# Patient Record
Sex: MALE | Race: WHITE | NOT HISPANIC OR LATINO | Employment: OTHER | URBAN - METROPOLITAN AREA
[De-identification: names, ages, dates, MRNs, and addresses within clinical notes are randomized per-mention and may not be internally consistent; named-entity substitution may affect disease eponyms.]

---

## 2017-01-03 ENCOUNTER — HOSPITAL ENCOUNTER (OUTPATIENT)
Dept: NUCLEAR MEDICINE | Facility: HOSPITAL | Age: 82
Discharge: HOME | End: 2017-01-03
Attending: INTERNAL MEDICINE

## 2017-01-03 DIAGNOSIS — C61 PROSTATE CANCER (H): ICD-10-CM

## 2017-01-12 ENCOUNTER — AMBULATORY - HEALTHEAST (OUTPATIENT)
Dept: CARDIOLOGY | Facility: CLINIC | Age: 82
End: 2017-01-12

## 2017-01-12 DIAGNOSIS — M81.0 OSTEOPOROSIS: ICD-10-CM

## 2017-01-12 DIAGNOSIS — D64.9 ANEMIA: ICD-10-CM

## 2017-01-12 DIAGNOSIS — I48.20 CHRONIC ATRIAL FIBRILLATION (H): ICD-10-CM

## 2017-01-21 ENCOUNTER — COMMUNICATION - HEALTHEAST (OUTPATIENT)
Dept: CARDIOLOGY | Facility: CLINIC | Age: 82
End: 2017-01-21

## 2017-01-21 DIAGNOSIS — I25.10 CAD (CORONARY ARTERY DISEASE): ICD-10-CM

## 2017-01-26 ENCOUNTER — AMBULATORY - HEALTHEAST (OUTPATIENT)
Dept: CARDIOLOGY | Facility: CLINIC | Age: 82
End: 2017-01-26

## 2017-01-26 ENCOUNTER — COMMUNICATION - HEALTHEAST (OUTPATIENT)
Dept: CARDIOLOGY | Facility: CLINIC | Age: 82
End: 2017-01-26

## 2017-01-26 DIAGNOSIS — I48.20 CHRONIC ATRIAL FIBRILLATION (H): ICD-10-CM

## 2017-01-31 ENCOUNTER — COMMUNICATION - HEALTHEAST (OUTPATIENT)
Dept: CARDIOLOGY | Facility: CLINIC | Age: 82
End: 2017-01-31

## 2017-02-08 ENCOUNTER — AMBULATORY - HEALTHEAST (OUTPATIENT)
Dept: CARDIOLOGY | Facility: CLINIC | Age: 82
End: 2017-02-08

## 2017-02-08 DIAGNOSIS — I48.20 CHRONIC ATRIAL FIBRILLATION (H): ICD-10-CM

## 2017-02-20 ENCOUNTER — RECORDS - HEALTHEAST (OUTPATIENT)
Dept: LAB | Facility: CLINIC | Age: 82
End: 2017-02-20

## 2017-02-20 LAB
CHOLEST SERPL-MCNC: 142 MG/DL
FASTING STATUS PATIENT QL REPORTED: NORMAL
HDLC SERPL-MCNC: 45 MG/DL
LDLC SERPL CALC-MCNC: 81 MG/DL
TRIGL SERPL-MCNC: 81 MG/DL

## 2017-02-22 ENCOUNTER — AMBULATORY - HEALTHEAST (OUTPATIENT)
Dept: CARDIOLOGY | Facility: CLINIC | Age: 82
End: 2017-02-22

## 2017-02-22 DIAGNOSIS — I48.20 CHRONIC ATRIAL FIBRILLATION (H): ICD-10-CM

## 2017-03-28 ENCOUNTER — AMBULATORY - HEALTHEAST (OUTPATIENT)
Dept: CARDIOLOGY | Facility: CLINIC | Age: 82
End: 2017-03-28

## 2017-03-28 DIAGNOSIS — I48.20 CHRONIC ATRIAL FIBRILLATION (H): ICD-10-CM

## 2017-04-05 ENCOUNTER — AMBULATORY - HEALTHEAST (OUTPATIENT)
Dept: CARDIOLOGY | Facility: CLINIC | Age: 82
End: 2017-04-05

## 2017-04-05 DIAGNOSIS — I48.20 CHRONIC ATRIAL FIBRILLATION (H): ICD-10-CM

## 2017-04-19 ENCOUNTER — AMBULATORY - HEALTHEAST (OUTPATIENT)
Dept: CARDIOLOGY | Facility: CLINIC | Age: 82
End: 2017-04-19

## 2017-04-19 DIAGNOSIS — I48.91 ATRIAL FIBRILLATION (H): ICD-10-CM

## 2017-05-03 ENCOUNTER — AMBULATORY - HEALTHEAST (OUTPATIENT)
Dept: CARDIOLOGY | Facility: CLINIC | Age: 82
End: 2017-05-03

## 2017-05-03 DIAGNOSIS — I48.20 CHRONIC ATRIAL FIBRILLATION (H): ICD-10-CM

## 2017-05-15 ENCOUNTER — COMMUNICATION - HEALTHEAST (OUTPATIENT)
Dept: CARDIOLOGY | Facility: CLINIC | Age: 82
End: 2017-05-15

## 2017-05-15 DIAGNOSIS — I48.91 A-FIB (H): ICD-10-CM

## 2017-05-31 ENCOUNTER — AMBULATORY - HEALTHEAST (OUTPATIENT)
Dept: CARDIOLOGY | Facility: CLINIC | Age: 82
End: 2017-05-31

## 2017-05-31 ENCOUNTER — COMMUNICATION - HEALTHEAST (OUTPATIENT)
Dept: CARDIOLOGY | Facility: CLINIC | Age: 82
End: 2017-05-31

## 2017-05-31 DIAGNOSIS — I48.91 A-FIB (H): ICD-10-CM

## 2017-05-31 DIAGNOSIS — I48.20 CHRONIC ATRIAL FIBRILLATION (H): ICD-10-CM

## 2017-05-31 DIAGNOSIS — I10 ESSENTIAL HYPERTENSION, BENIGN: ICD-10-CM

## 2017-05-31 DIAGNOSIS — I35.9 AORTIC VALVE DISORDERS: ICD-10-CM

## 2017-06-14 ENCOUNTER — AMBULATORY - HEALTHEAST (OUTPATIENT)
Dept: CARDIOLOGY | Facility: CLINIC | Age: 82
End: 2017-06-14

## 2017-06-14 DIAGNOSIS — I48.20 CHRONIC ATRIAL FIBRILLATION (H): ICD-10-CM

## 2017-07-11 ENCOUNTER — AMBULATORY - HEALTHEAST (OUTPATIENT)
Dept: CARDIOLOGY | Facility: CLINIC | Age: 82
End: 2017-07-11

## 2017-07-11 DIAGNOSIS — I48.20 CHRONIC ATRIAL FIBRILLATION (H): ICD-10-CM

## 2017-07-25 ENCOUNTER — RECORDS - HEALTHEAST (OUTPATIENT)
Dept: LAB | Facility: CLINIC | Age: 82
End: 2017-07-25

## 2017-07-25 LAB
CHOLEST SERPL-MCNC: 108 MG/DL
FASTING STATUS PATIENT QL REPORTED: YES
HDLC SERPL-MCNC: 44 MG/DL
LDLC SERPL CALC-MCNC: 51 MG/DL
TRIGL SERPL-MCNC: 64 MG/DL

## 2017-08-02 ENCOUNTER — AMBULATORY - HEALTHEAST (OUTPATIENT)
Dept: ADMINISTRATIVE | Facility: REHABILITATION | Age: 82
End: 2017-08-02

## 2017-08-02 DIAGNOSIS — R26.81 UNSTEADY GAIT: ICD-10-CM

## 2017-08-08 ENCOUNTER — AMBULATORY - HEALTHEAST (OUTPATIENT)
Dept: CARDIOLOGY | Facility: CLINIC | Age: 82
End: 2017-08-08

## 2017-08-08 DIAGNOSIS — I48.20 CHRONIC ATRIAL FIBRILLATION (H): ICD-10-CM

## 2017-08-09 ENCOUNTER — OFFICE VISIT - HEALTHEAST (OUTPATIENT)
Dept: PHYSICAL THERAPY | Facility: REHABILITATION | Age: 82
End: 2017-08-09

## 2017-08-09 DIAGNOSIS — Z74.09 DECREASED FUNCTIONAL MOBILITY AND ENDURANCE: ICD-10-CM

## 2017-08-09 DIAGNOSIS — R26.81 GAIT INSTABILITY: ICD-10-CM

## 2017-08-09 DIAGNOSIS — R26.89 POOR BALANCE: ICD-10-CM

## 2017-08-21 ENCOUNTER — OFFICE VISIT - HEALTHEAST (OUTPATIENT)
Dept: PHYSICAL THERAPY | Facility: REHABILITATION | Age: 82
End: 2017-08-21

## 2017-08-21 DIAGNOSIS — R26.89 POOR BALANCE: ICD-10-CM

## 2017-08-21 DIAGNOSIS — R26.81 GAIT INSTABILITY: ICD-10-CM

## 2017-08-21 DIAGNOSIS — Z74.09 DECREASED FUNCTIONAL MOBILITY AND ENDURANCE: ICD-10-CM

## 2017-08-24 ENCOUNTER — AMBULATORY - HEALTHEAST (OUTPATIENT)
Dept: CARDIOLOGY | Facility: CLINIC | Age: 82
End: 2017-08-24

## 2017-08-24 ENCOUNTER — OFFICE VISIT - HEALTHEAST (OUTPATIENT)
Dept: PHYSICAL THERAPY | Facility: REHABILITATION | Age: 82
End: 2017-08-24

## 2017-08-24 DIAGNOSIS — Z74.09 DECREASED FUNCTIONAL MOBILITY AND ENDURANCE: ICD-10-CM

## 2017-08-24 DIAGNOSIS — R26.81 GAIT INSTABILITY: ICD-10-CM

## 2017-08-24 DIAGNOSIS — I48.20 CHRONIC ATRIAL FIBRILLATION (H): ICD-10-CM

## 2017-08-24 DIAGNOSIS — R26.89 POOR BALANCE: ICD-10-CM

## 2017-08-28 ENCOUNTER — OFFICE VISIT - HEALTHEAST (OUTPATIENT)
Dept: PHYSICAL THERAPY | Facility: REHABILITATION | Age: 82
End: 2017-08-28

## 2017-08-28 DIAGNOSIS — R26.81 GAIT INSTABILITY: ICD-10-CM

## 2017-08-28 DIAGNOSIS — Z74.09 DECREASED FUNCTIONAL MOBILITY AND ENDURANCE: ICD-10-CM

## 2017-08-28 DIAGNOSIS — R26.89 POOR BALANCE: ICD-10-CM

## 2017-08-30 ENCOUNTER — OFFICE VISIT - HEALTHEAST (OUTPATIENT)
Dept: PHYSICAL THERAPY | Facility: REHABILITATION | Age: 82
End: 2017-08-30

## 2017-08-30 DIAGNOSIS — R26.89 POOR BALANCE: ICD-10-CM

## 2017-08-30 DIAGNOSIS — R26.81 GAIT INSTABILITY: ICD-10-CM

## 2017-08-30 DIAGNOSIS — Z74.09 DECREASED FUNCTIONAL MOBILITY AND ENDURANCE: ICD-10-CM

## 2017-09-05 ENCOUNTER — OFFICE VISIT - HEALTHEAST (OUTPATIENT)
Dept: PHYSICAL THERAPY | Facility: REHABILITATION | Age: 82
End: 2017-09-05

## 2017-09-05 DIAGNOSIS — R26.81 GAIT INSTABILITY: ICD-10-CM

## 2017-09-05 DIAGNOSIS — R26.89 POOR BALANCE: ICD-10-CM

## 2017-09-05 DIAGNOSIS — Z74.09 DECREASED FUNCTIONAL MOBILITY AND ENDURANCE: ICD-10-CM

## 2017-09-07 ENCOUNTER — OFFICE VISIT - HEALTHEAST (OUTPATIENT)
Dept: PHYSICAL THERAPY | Facility: REHABILITATION | Age: 82
End: 2017-09-07

## 2017-09-07 DIAGNOSIS — Z74.09 DECREASED FUNCTIONAL MOBILITY AND ENDURANCE: ICD-10-CM

## 2017-09-07 DIAGNOSIS — R26.89 POOR BALANCE: ICD-10-CM

## 2017-09-07 DIAGNOSIS — R26.81 GAIT INSTABILITY: ICD-10-CM

## 2017-09-07 DIAGNOSIS — M25.652 HIP STIFFNESS, LEFT: ICD-10-CM

## 2017-09-12 ENCOUNTER — OFFICE VISIT - HEALTHEAST (OUTPATIENT)
Dept: PHYSICAL THERAPY | Facility: REHABILITATION | Age: 82
End: 2017-09-12

## 2017-09-12 DIAGNOSIS — R26.89 POOR BALANCE: ICD-10-CM

## 2017-09-12 DIAGNOSIS — R26.81 GAIT INSTABILITY: ICD-10-CM

## 2017-09-12 DIAGNOSIS — Z74.09 DECREASED FUNCTIONAL MOBILITY AND ENDURANCE: ICD-10-CM

## 2017-09-14 ENCOUNTER — OFFICE VISIT - HEALTHEAST (OUTPATIENT)
Dept: PHYSICAL THERAPY | Facility: REHABILITATION | Age: 82
End: 2017-09-14

## 2017-09-14 DIAGNOSIS — R26.89 POOR BALANCE: ICD-10-CM

## 2017-09-14 DIAGNOSIS — R26.81 GAIT INSTABILITY: ICD-10-CM

## 2017-09-14 DIAGNOSIS — Z74.09 DECREASED FUNCTIONAL MOBILITY AND ENDURANCE: ICD-10-CM

## 2017-09-19 ENCOUNTER — OFFICE VISIT - HEALTHEAST (OUTPATIENT)
Dept: PHYSICAL THERAPY | Facility: REHABILITATION | Age: 82
End: 2017-09-19

## 2017-09-19 DIAGNOSIS — R26.89 POOR BALANCE: ICD-10-CM

## 2017-09-19 DIAGNOSIS — Z74.09 DECREASED FUNCTIONAL MOBILITY AND ENDURANCE: ICD-10-CM

## 2017-09-19 DIAGNOSIS — R26.81 GAIT INSTABILITY: ICD-10-CM

## 2017-09-22 ENCOUNTER — AMBULATORY - HEALTHEAST (OUTPATIENT)
Dept: CARDIOLOGY | Facility: CLINIC | Age: 82
End: 2017-09-22

## 2017-09-22 DIAGNOSIS — I48.20 CHRONIC ATRIAL FIBRILLATION (H): ICD-10-CM

## 2017-10-19 ENCOUNTER — AMBULATORY - HEALTHEAST (OUTPATIENT)
Dept: CARDIOLOGY | Facility: CLINIC | Age: 82
End: 2017-10-19

## 2017-10-19 DIAGNOSIS — I48.91 ATRIAL FIBRILLATION (H): ICD-10-CM

## 2017-11-07 ENCOUNTER — RECORDS - HEALTHEAST (OUTPATIENT)
Dept: LAB | Facility: CLINIC | Age: 82
End: 2017-11-07

## 2017-11-07 LAB
CHOLEST SERPL-MCNC: 131 MG/DL
FASTING STATUS PATIENT QL REPORTED: YES
HDLC SERPL-MCNC: 47 MG/DL
LDLC SERPL CALC-MCNC: 74 MG/DL
TRIGL SERPL-MCNC: 51 MG/DL

## 2017-11-16 ENCOUNTER — AMBULATORY - HEALTHEAST (OUTPATIENT)
Dept: CARDIOLOGY | Facility: CLINIC | Age: 82
End: 2017-11-16

## 2017-11-16 DIAGNOSIS — I48.20 CHRONIC ATRIAL FIBRILLATION (H): ICD-10-CM

## 2017-12-05 ENCOUNTER — RECORDS - HEALTHEAST (OUTPATIENT)
Dept: ADMINISTRATIVE | Facility: OTHER | Age: 82
End: 2017-12-05

## 2017-12-05 ENCOUNTER — AMBULATORY - HEALTHEAST (OUTPATIENT)
Dept: CARDIOLOGY | Facility: CLINIC | Age: 82
End: 2017-12-05

## 2017-12-06 ENCOUNTER — AMBULATORY - HEALTHEAST (OUTPATIENT)
Dept: CARDIOLOGY | Facility: CLINIC | Age: 82
End: 2017-12-06

## 2017-12-06 DIAGNOSIS — I48.20 CHRONIC ATRIAL FIBRILLATION (H): ICD-10-CM

## 2017-12-08 ENCOUNTER — OFFICE VISIT - HEALTHEAST (OUTPATIENT)
Dept: CARDIOLOGY | Facility: CLINIC | Age: 82
End: 2017-12-08

## 2017-12-08 DIAGNOSIS — I71.21 ASCENDING AORTIC ANEURYSM (H): ICD-10-CM

## 2017-12-08 DIAGNOSIS — I48.20 CHRONIC ATRIAL FIBRILLATION (H): ICD-10-CM

## 2017-12-08 DIAGNOSIS — I25.10 CORONARY ARTERY DISEASE INVOLVING NATIVE CORONARY ARTERY OF NATIVE HEART WITHOUT ANGINA PECTORIS: ICD-10-CM

## 2017-12-08 DIAGNOSIS — I34.0 NON-RHEUMATIC MITRAL REGURGITATION: ICD-10-CM

## 2017-12-08 ASSESSMENT — MIFFLIN-ST. JEOR: SCORE: 1309.74

## 2017-12-12 ENCOUNTER — HOSPITAL ENCOUNTER (OUTPATIENT)
Dept: CT IMAGING | Facility: HOSPITAL | Age: 82
Discharge: HOME | End: 2017-12-12
Attending: INTERNAL MEDICINE

## 2017-12-12 DIAGNOSIS — I71.21 ASCENDING AORTIC ANEURYSM (H): ICD-10-CM

## 2018-01-02 ENCOUNTER — AMBULATORY - HEALTHEAST (OUTPATIENT)
Dept: CARDIOLOGY | Facility: CLINIC | Age: 83
End: 2018-01-02

## 2018-01-02 DIAGNOSIS — I48.91 ATRIAL FIBRILLATION (H): ICD-10-CM

## 2018-01-02 LAB — INTERNATIONAL NORMALIZATION RATIO, POC - HISTORICAL: 2.3

## 2018-01-15 ENCOUNTER — COMMUNICATION - HEALTHEAST (OUTPATIENT)
Dept: CARDIOLOGY | Facility: CLINIC | Age: 83
End: 2018-01-15

## 2018-01-15 DIAGNOSIS — I48.91 A-FIB (H): ICD-10-CM

## 2018-01-15 DIAGNOSIS — I25.10 CAD (CORONARY ARTERY DISEASE): ICD-10-CM

## 2018-02-05 ENCOUNTER — AMBULATORY - HEALTHEAST (OUTPATIENT)
Dept: CARDIOLOGY | Facility: CLINIC | Age: 83
End: 2018-02-05

## 2018-02-05 ENCOUNTER — RECORDS - HEALTHEAST (OUTPATIENT)
Dept: LAB | Facility: CLINIC | Age: 83
End: 2018-02-05

## 2018-02-05 DIAGNOSIS — I48.20 CHRONIC ATRIAL FIBRILLATION (H): ICD-10-CM

## 2018-02-05 LAB
ALBUMIN SERPL-MCNC: 3.8 G/DL (ref 3.5–5)
ANION GAP SERPL CALCULATED.3IONS-SCNC: 9 MMOL/L (ref 5–18)
BUN SERPL-MCNC: 25 MG/DL (ref 8–28)
CALCIUM SERPL-MCNC: 11 MG/DL (ref 8.5–10.5)
CHLORIDE BLD-SCNC: 113 MMOL/L (ref 98–107)
CO2 SERPL-SCNC: 20 MMOL/L (ref 22–31)
CREAT SERPL-MCNC: 0.99 MG/DL (ref 0.7–1.3)
GFR SERPL CREATININE-BSD FRML MDRD: >60 ML/MIN/1.73M2
GLUCOSE BLD-MCNC: 98 MG/DL (ref 70–125)
INTERNATIONAL NORMALIZATION RATIO, POC - HISTORICAL: 2.3
PHOSPHATE SERPL-MCNC: 3 MG/DL (ref 2.5–4.5)
POTASSIUM BLD-SCNC: 3.8 MMOL/L (ref 3.5–5)
SODIUM SERPL-SCNC: 142 MMOL/L (ref 136–145)

## 2018-02-08 ENCOUNTER — ANESTHESIA - HEALTHEAST (OUTPATIENT)
Dept: SURGERY | Facility: HOSPITAL | Age: 83
End: 2018-02-08

## 2018-02-08 ASSESSMENT — MIFFLIN-ST. JEOR: SCORE: 1281.39

## 2018-02-09 ENCOUNTER — SURGERY - HEALTHEAST (OUTPATIENT)
Dept: SURGERY | Facility: HOSPITAL | Age: 83
End: 2018-02-09

## 2018-02-12 ENCOUNTER — HOSPITAL ENCOUNTER (OUTPATIENT)
Dept: RADIOLOGY | Facility: HOSPITAL | Age: 83
Discharge: HOME | End: 2018-02-12
Attending: UROLOGY

## 2018-02-12 ENCOUNTER — ANESTHESIA - HEALTHEAST (OUTPATIENT)
Dept: SURGERY | Facility: HOSPITAL | Age: 83
End: 2018-02-12

## 2018-02-12 ENCOUNTER — SURGERY - HEALTHEAST (OUTPATIENT)
Dept: SURGERY | Facility: HOSPITAL | Age: 83
End: 2018-02-12

## 2018-02-13 ENCOUNTER — COMMUNICATION - HEALTHEAST (OUTPATIENT)
Dept: CARDIOLOGY | Facility: CLINIC | Age: 83
End: 2018-02-13

## 2018-02-14 ENCOUNTER — AMBULATORY - HEALTHEAST (OUTPATIENT)
Dept: CARDIOLOGY | Facility: CLINIC | Age: 83
End: 2018-02-14

## 2018-02-14 DIAGNOSIS — I48.20 CHRONIC ATRIAL FIBRILLATION (H): ICD-10-CM

## 2018-03-02 ASSESSMENT — MIFFLIN-ST. JEOR: SCORE: 1281.39

## 2018-03-05 ENCOUNTER — SURGERY - HEALTHEAST (OUTPATIENT)
Dept: SURGERY | Facility: HOSPITAL | Age: 83
End: 2018-03-05

## 2018-03-05 ENCOUNTER — ANESTHESIA - HEALTHEAST (OUTPATIENT)
Dept: SURGERY | Facility: HOSPITAL | Age: 83
End: 2018-03-05

## 2018-03-08 ENCOUNTER — AMBULATORY - HEALTHEAST (OUTPATIENT)
Dept: CARDIOLOGY | Facility: CLINIC | Age: 83
End: 2018-03-08

## 2018-03-08 DIAGNOSIS — I48.20 CHRONIC ATRIAL FIBRILLATION (H): ICD-10-CM

## 2018-03-08 LAB — INTERNATIONAL NORMALIZATION RATIO, POC - HISTORICAL: 1.1

## 2018-03-15 ENCOUNTER — AMBULATORY - HEALTHEAST (OUTPATIENT)
Dept: CARDIOLOGY | Facility: CLINIC | Age: 83
End: 2018-03-15

## 2018-03-15 DIAGNOSIS — I48.20 CHRONIC ATRIAL FIBRILLATION (H): ICD-10-CM

## 2018-03-15 LAB — INTERNATIONAL NORMALIZATION RATIO, POC - HISTORICAL: 2.3

## 2018-03-29 ENCOUNTER — AMBULATORY - HEALTHEAST (OUTPATIENT)
Dept: CARDIOLOGY | Facility: CLINIC | Age: 83
End: 2018-03-29

## 2018-03-29 DIAGNOSIS — I48.20 CHRONIC ATRIAL FIBRILLATION (H): ICD-10-CM

## 2018-03-29 LAB — INTERNATIONAL NORMALIZATION RATIO, POC - HISTORICAL: 3.2

## 2018-04-12 ENCOUNTER — AMBULATORY - HEALTHEAST (OUTPATIENT)
Dept: CARDIOLOGY | Facility: CLINIC | Age: 83
End: 2018-04-12

## 2018-04-12 ENCOUNTER — RECORDS - HEALTHEAST (OUTPATIENT)
Dept: LAB | Facility: CLINIC | Age: 83
End: 2018-04-12

## 2018-04-12 DIAGNOSIS — I48.20 CHRONIC ATRIAL FIBRILLATION (H): ICD-10-CM

## 2018-04-12 LAB
ALBUMIN SERPL-MCNC: 3.8 G/DL (ref 3.5–5)
ALP SERPL-CCNC: 47 U/L (ref 45–120)
ALT SERPL W P-5'-P-CCNC: 15 U/L (ref 0–45)
ANION GAP SERPL CALCULATED.3IONS-SCNC: 8 MMOL/L (ref 5–18)
AST SERPL W P-5'-P-CCNC: 24 U/L (ref 0–40)
BASOPHILS # BLD AUTO: 0 THOU/UL (ref 0–0.2)
BASOPHILS NFR BLD AUTO: 1 % (ref 0–2)
BILIRUB SERPL-MCNC: 0.7 MG/DL (ref 0–1)
BUN SERPL-MCNC: 24 MG/DL (ref 8–28)
CALCIUM SERPL-MCNC: 10.8 MG/DL (ref 8.5–10.5)
CHLORIDE BLD-SCNC: 110 MMOL/L (ref 98–107)
CHOLEST SERPL-MCNC: 136 MG/DL
CO2 SERPL-SCNC: 23 MMOL/L (ref 22–31)
CREAT SERPL-MCNC: 1.21 MG/DL (ref 0.7–1.3)
EOSINOPHIL # BLD AUTO: 0.1 THOU/UL (ref 0–0.4)
EOSINOPHIL NFR BLD AUTO: 3 % (ref 0–6)
ERYTHROCYTE [DISTWIDTH] IN BLOOD BY AUTOMATED COUNT: 14.4 % (ref 11–14.5)
FASTING STATUS PATIENT QL REPORTED: NORMAL
GFR SERPL CREATININE-BSD FRML MDRD: 58 ML/MIN/1.73M2
GLUCOSE BLD-MCNC: 92 MG/DL (ref 70–125)
HCT VFR BLD AUTO: 34.6 % (ref 40–54)
HDLC SERPL-MCNC: 46 MG/DL
HGB BLD-MCNC: 11.2 G/DL (ref 14–18)
INTERNATIONAL NORMALIZATION RATIO, POC - HISTORICAL: 2.2
LDLC SERPL CALC-MCNC: 76 MG/DL
LYMPHOCYTES # BLD AUTO: 0.7 THOU/UL (ref 0.8–4.4)
LYMPHOCYTES NFR BLD AUTO: 16 % (ref 20–40)
MCH RBC QN AUTO: 30 PG (ref 27–34)
MCHC RBC AUTO-ENTMCNC: 32.4 G/DL (ref 32–36)
MCV RBC AUTO: 93 FL (ref 80–100)
MONOCYTES # BLD AUTO: 0.5 THOU/UL (ref 0–0.9)
MONOCYTES NFR BLD AUTO: 11 % (ref 2–10)
NEUTROPHILS # BLD AUTO: 2.9 THOU/UL (ref 2–7.7)
NEUTROPHILS NFR BLD AUTO: 70 % (ref 50–70)
PLATELET # BLD AUTO: 212 THOU/UL (ref 140–440)
PMV BLD AUTO: 10.3 FL (ref 8.5–12.5)
POTASSIUM BLD-SCNC: 4 MMOL/L (ref 3.5–5)
PROT SERPL-MCNC: 6.4 G/DL (ref 6–8)
RBC # BLD AUTO: 3.73 MILL/UL (ref 4.4–6.2)
SODIUM SERPL-SCNC: 141 MMOL/L (ref 136–145)
TRIGL SERPL-MCNC: 71 MG/DL
TSH SERPL DL<=0.005 MIU/L-ACNC: 1.25 UIU/ML (ref 0.3–5)
WBC: 4.2 THOU/UL (ref 4–11)

## 2018-05-10 ENCOUNTER — AMBULATORY - HEALTHEAST (OUTPATIENT)
Dept: CARDIOLOGY | Facility: CLINIC | Age: 83
End: 2018-05-10

## 2018-05-10 DIAGNOSIS — I48.20 CHRONIC ATRIAL FIBRILLATION (H): ICD-10-CM

## 2018-05-10 LAB — INTERNATIONAL NORMALIZATION RATIO, POC - HISTORICAL: 1.7

## 2018-05-29 ENCOUNTER — AMBULATORY - HEALTHEAST (OUTPATIENT)
Dept: CARDIOLOGY | Facility: CLINIC | Age: 83
End: 2018-05-29

## 2018-05-29 DIAGNOSIS — I48.91 ATRIAL FIBRILLATION (H): ICD-10-CM

## 2018-05-29 LAB — INTERNATIONAL NORMALIZATION RATIO, POC - HISTORICAL: 1.7

## 2018-06-05 ENCOUNTER — RECORDS - HEALTHEAST (OUTPATIENT)
Dept: LAB | Facility: CLINIC | Age: 83
End: 2018-06-05

## 2018-06-05 ENCOUNTER — COMMUNICATION - HEALTHEAST (OUTPATIENT)
Dept: CARDIOLOGY | Facility: CLINIC | Age: 83
End: 2018-06-05

## 2018-06-05 ENCOUNTER — AMBULATORY - HEALTHEAST (OUTPATIENT)
Dept: CARDIOLOGY | Facility: CLINIC | Age: 83
End: 2018-06-05

## 2018-06-05 DIAGNOSIS — I48.20 CHRONIC ATRIAL FIBRILLATION (H): ICD-10-CM

## 2018-06-05 LAB
ALBUMIN SERPL-MCNC: 3.8 G/DL (ref 3.5–5)
ANION GAP SERPL CALCULATED.3IONS-SCNC: 8 MMOL/L (ref 5–18)
BUN SERPL-MCNC: 25 MG/DL (ref 8–28)
CALCIUM SERPL-MCNC: 10.6 MG/DL (ref 8.5–10.5)
CHLORIDE BLD-SCNC: 111 MMOL/L (ref 98–107)
CO2 SERPL-SCNC: 24 MMOL/L (ref 22–31)
CREAT SERPL-MCNC: 0.95 MG/DL (ref 0.7–1.3)
GFR SERPL CREATININE-BSD FRML MDRD: >60 ML/MIN/1.73M2
GLUCOSE BLD-MCNC: 93 MG/DL (ref 70–125)
INTERNATIONAL NORMALIZATION RATIO, POC - HISTORICAL: 2.4
PHOSPHATE SERPL-MCNC: 2.1 MG/DL (ref 2.5–4.5)
POTASSIUM BLD-SCNC: 3.9 MMOL/L (ref 3.5–5)
SODIUM SERPL-SCNC: 143 MMOL/L (ref 136–145)

## 2018-06-12 ENCOUNTER — AMBULATORY - HEALTHEAST (OUTPATIENT)
Dept: CARDIOLOGY | Facility: CLINIC | Age: 83
End: 2018-06-12

## 2018-06-12 DIAGNOSIS — I48.20 CHRONIC ATRIAL FIBRILLATION (H): ICD-10-CM

## 2018-06-12 LAB — INTERNATIONAL NORMALIZATION RATIO, POC - HISTORICAL: 3.8

## 2018-06-19 ENCOUNTER — AMBULATORY - HEALTHEAST (OUTPATIENT)
Dept: CARDIOLOGY | Facility: CLINIC | Age: 83
End: 2018-06-19

## 2018-06-19 DIAGNOSIS — Z79.01 LONG-TERM (CURRENT) USE OF ANTICOAGULANTS: ICD-10-CM

## 2018-06-19 DIAGNOSIS — I48.91 ATRIAL FIBRILLATION (H): ICD-10-CM

## 2018-06-19 LAB — POC INR - HE - HISTORICAL: 3.1 (ref 0.9–1.1)

## 2018-06-26 ENCOUNTER — AMBULATORY - HEALTHEAST (OUTPATIENT)
Dept: CARDIOLOGY | Facility: CLINIC | Age: 83
End: 2018-06-26

## 2018-06-26 DIAGNOSIS — I48.20 CHRONIC ATRIAL FIBRILLATION (H): ICD-10-CM

## 2018-06-26 LAB — INTERNATIONAL NORMALIZATION RATIO, POC - HISTORICAL: 4.5

## 2018-06-29 ENCOUNTER — COMMUNICATION - HEALTHEAST (OUTPATIENT)
Dept: CARDIOLOGY | Facility: CLINIC | Age: 83
End: 2018-06-29

## 2018-07-03 ENCOUNTER — AMBULATORY - HEALTHEAST (OUTPATIENT)
Dept: CARDIOLOGY | Facility: CLINIC | Age: 83
End: 2018-07-03

## 2018-07-03 DIAGNOSIS — I48.91 ATRIAL FIBRILLATION (H): ICD-10-CM

## 2018-07-03 LAB — INTERNATIONAL NORMALIZATION RATIO, POC - HISTORICAL: 3.6

## 2018-07-16 ENCOUNTER — COMMUNICATION - HEALTHEAST (OUTPATIENT)
Dept: CARDIOLOGY | Facility: CLINIC | Age: 83
End: 2018-07-16

## 2018-07-16 DIAGNOSIS — I25.10 CAD (CORONARY ARTERY DISEASE): ICD-10-CM

## 2018-07-16 DIAGNOSIS — I48.91 A-FIB (H): ICD-10-CM

## 2018-07-17 ENCOUNTER — AMBULATORY - HEALTHEAST (OUTPATIENT)
Dept: CARDIOLOGY | Facility: CLINIC | Age: 83
End: 2018-07-17

## 2018-07-17 DIAGNOSIS — I48.91 ATRIAL FIBRILLATION (H): ICD-10-CM

## 2018-07-17 LAB — INTERNATIONAL NORMALIZATION RATIO, POC - HISTORICAL: 3

## 2018-07-31 ENCOUNTER — AMBULATORY - HEALTHEAST (OUTPATIENT)
Dept: CARDIOLOGY | Facility: CLINIC | Age: 83
End: 2018-07-31

## 2018-07-31 DIAGNOSIS — I48.91 ATRIAL FIBRILLATION (H): ICD-10-CM

## 2018-07-31 LAB — INTERNATIONAL NORMALIZATION RATIO, POC - HISTORICAL: 1.4

## 2018-08-07 ENCOUNTER — AMBULATORY - HEALTHEAST (OUTPATIENT)
Dept: CARDIOLOGY | Facility: CLINIC | Age: 83
End: 2018-08-07

## 2018-08-07 DIAGNOSIS — I48.91 ATRIAL FIBRILLATION (H): ICD-10-CM

## 2018-08-07 LAB — INTERNATIONAL NORMALIZATION RATIO, POC - HISTORICAL: 1.5

## 2018-08-14 ENCOUNTER — AMBULATORY - HEALTHEAST (OUTPATIENT)
Dept: CARDIOLOGY | Facility: CLINIC | Age: 83
End: 2018-08-14

## 2018-08-14 DIAGNOSIS — I48.21 PERMANENT ATRIAL FIBRILLATION (H): ICD-10-CM

## 2018-08-14 LAB — INTERNATIONAL NORMALIZATION RATIO, POC - HISTORICAL: 2.1

## 2018-08-28 ENCOUNTER — AMBULATORY - HEALTHEAST (OUTPATIENT)
Dept: CARDIOLOGY | Facility: CLINIC | Age: 83
End: 2018-08-28

## 2018-08-28 DIAGNOSIS — I48.91 ATRIAL FIBRILLATION (H): ICD-10-CM

## 2018-08-28 LAB — INTERNATIONAL NORMALIZATION RATIO, POC - HISTORICAL: 1.8

## 2018-08-31 ENCOUNTER — COMMUNICATION - HEALTHEAST (OUTPATIENT)
Dept: CARDIOLOGY | Facility: CLINIC | Age: 83
End: 2018-08-31

## 2018-08-31 DIAGNOSIS — I48.20 CHRONIC ATRIAL FIBRILLATION (H): ICD-10-CM

## 2018-09-11 ENCOUNTER — AMBULATORY - HEALTHEAST (OUTPATIENT)
Dept: CARDIOLOGY | Facility: CLINIC | Age: 83
End: 2018-09-11

## 2018-09-11 DIAGNOSIS — I48.21 PERMANENT ATRIAL FIBRILLATION (H): ICD-10-CM

## 2018-09-11 LAB — INTERNATIONAL NORMALIZATION RATIO, POC - HISTORICAL: 2.4

## 2018-09-25 ENCOUNTER — AMBULATORY - HEALTHEAST (OUTPATIENT)
Dept: CARDIOLOGY | Facility: CLINIC | Age: 83
End: 2018-09-25

## 2018-09-25 DIAGNOSIS — I48.91 ATRIAL FIBRILLATION (H): ICD-10-CM

## 2018-09-25 DIAGNOSIS — I34.0 NON-RHEUMATIC MITRAL REGURGITATION: ICD-10-CM

## 2018-09-25 DIAGNOSIS — I71.21 ASCENDING AORTIC ANEURYSM (H): ICD-10-CM

## 2018-09-25 LAB — INTERNATIONAL NORMALIZATION RATIO, POC - HISTORICAL: 2.2

## 2018-10-11 ENCOUNTER — COMMUNICATION - HEALTHEAST (OUTPATIENT)
Dept: CARDIOLOGY | Facility: CLINIC | Age: 83
End: 2018-10-11

## 2018-10-11 DIAGNOSIS — I48.91 A-FIB (H): ICD-10-CM

## 2018-10-23 ENCOUNTER — AMBULATORY - HEALTHEAST (OUTPATIENT)
Dept: CARDIOLOGY | Facility: CLINIC | Age: 83
End: 2018-10-23

## 2018-10-23 DIAGNOSIS — I48.91 ATRIAL FIBRILLATION (H): ICD-10-CM

## 2018-10-23 LAB — INTERNATIONAL NORMALIZATION RATIO, POC - HISTORICAL: 1.7

## 2018-11-08 ENCOUNTER — AMBULATORY - HEALTHEAST (OUTPATIENT)
Dept: CARDIOLOGY | Facility: CLINIC | Age: 83
End: 2018-11-08

## 2018-11-08 DIAGNOSIS — I48.91 ATRIAL FIBRILLATION (H): ICD-10-CM

## 2018-11-08 LAB — INTERNATIONAL NORMALIZATION RATIO, POC - HISTORICAL: 2.3

## 2018-11-24 ENCOUNTER — COMMUNICATION - HEALTHEAST (OUTPATIENT)
Dept: CARDIOLOGY | Facility: CLINIC | Age: 83
End: 2018-11-24

## 2018-11-24 DIAGNOSIS — I48.20 CHRONIC ATRIAL FIBRILLATION (H): ICD-10-CM

## 2018-12-13 ENCOUNTER — AMBULATORY - HEALTHEAST (OUTPATIENT)
Dept: CARDIOLOGY | Facility: CLINIC | Age: 83
End: 2018-12-13

## 2018-12-13 ENCOUNTER — HOSPITAL ENCOUNTER (OUTPATIENT)
Dept: CARDIOLOGY | Facility: HOSPITAL | Age: 83
Discharge: HOME | End: 2018-12-13
Attending: INTERNAL MEDICINE

## 2018-12-13 DIAGNOSIS — I71.21 ASCENDING AORTIC ANEURYSM (H): ICD-10-CM

## 2018-12-13 DIAGNOSIS — I34.0 NON-RHEUMATIC MITRAL REGURGITATION: ICD-10-CM

## 2018-12-13 DIAGNOSIS — I48.91 ATRIAL FIBRILLATION (H): ICD-10-CM

## 2018-12-13 LAB — INTERNATIONAL NORMALIZATION RATIO, POC - HISTORICAL: 2.4

## 2018-12-13 ASSESSMENT — MIFFLIN-ST. JEOR: SCORE: 1281.39

## 2018-12-14 ENCOUNTER — RECORDS - HEALTHEAST (OUTPATIENT)
Dept: ADMINISTRATIVE | Facility: OTHER | Age: 83
End: 2018-12-14

## 2018-12-14 LAB
AORTIC ROOT: 4.5 CM
AORTIC VALVE MEAN VELOCITY: 133 CM/S
AR DECEL SLOPE: 1710 MM/S2
AR PEAK VELOCITY: 358 CM/S
ASCENDING AORTA: 4.4 CM
AV DIMENSIONLESS INDEX VTI: 0.4
AV MEAN GRADIENT: 9 MMHG
AV PEAK GRADIENT: 15.7 MMHG
AV REGURGITANT PEAK GRADIENT: 51.3 MMHG
AV REGURGITATION PRESSURE HALF TIME: 628 MS
AV VALVE AREA: 2.1 CM2
BSA FOR ECHO PROCEDURE: 1.74 M2
CV BLOOD PRESSURE: ABNORMAL MMHG
CV ECHO HEIGHT: 66 IN
CV ECHO WEIGHT: 143 LBS
DOP CALC AO PEAK VEL: 198 CM/S
DOP CALC AO VTI: 39.4 CM
DOP CALC LVOT AREA: 4.91 CM2
DOP CALC LVOT DIAMETER: 2.5 CM
DOP CALC LVOT STROKE VOLUME: 83.4 CM3
DOP CALCLVOT PEAK VEL VTI: 17 CM
EJECTION FRACTION: 41 % (ref 55–75)
FRACTIONAL SHORTENING: 28.3 % (ref 28–44)
INTERVENTRICULAR SEPTUM IN END DIASTOLE: 1 CM (ref 0.6–1)
IVS/PW RATIO: 1.1
LA AREA 1: 33.1 CM2
LA AREA 2: 32.7 CM2
LEFT ATRIUM LENGTH: 6.64 CM
LEFT ATRIUM SIZE: 6.1 CM
LEFT ATRIUM TO AORTIC ROOT RATIO: 1.36 NO UNITS
LEFT ATRIUM VOLUME INDEX: 79.6 ML/M2
LEFT ATRIUM VOLUME: 138.6 ML
LEFT VENTRICLE CARDIAC INDEX: 3.2 L/MIN/M2
LEFT VENTRICLE CARDIAC OUTPUT: 5.5 L/MIN
LEFT VENTRICLE DIASTOLIC VOLUME INDEX: 65.5 CM3/M2 (ref 34–74)
LEFT VENTRICLE DIASTOLIC VOLUME: 114 CM3 (ref 62–150)
LEFT VENTRICLE HEART RATE: 66 BPM
LEFT VENTRICLE MASS INDEX: 132.8 G/M2
LEFT VENTRICLE SYSTOLIC VOLUME INDEX: 38.5 CM3/M2 (ref 11–31)
LEFT VENTRICLE SYSTOLIC VOLUME: 67 CM3 (ref 21–61)
LEFT VENTRICULAR INTERNAL DIMENSION IN DIASTOLE: 6 CM (ref 4.2–5.8)
LEFT VENTRICULAR INTERNAL DIMENSION IN SYSTOLE: 4.3 CM (ref 2.5–4)
LEFT VENTRICULAR MASS: 231.1 G
LEFT VENTRICULAR OUTFLOW TRACT MEAN GRADIENT: 1 MMHG
LEFT VENTRICULAR OUTFLOW TRACT MEAN VELOCITY: 57.1 CM/S
LEFT VENTRICULAR POSTERIOR WALL IN END DIASTOLE: 0.9 CM (ref 0.6–1)
LV STROKE VOLUME INDEX: 47.9 ML/M2
MITRAL REGURGITANT VELOCITY TIME INTEGRAL: 173 CM
MR FLOW: 107 CM3
MR MEAN GRADIENT: 73 MMHG
MR MEAN VELOCITY: 410 CM/S
MR PEAK GRADIENT: 109.8 MMHG
MR PISA EROA: 0.6 CM2
MR PISA RADIUS: 1.3 CM
MR PISA VN NYQUIST: 30.8 CM/S
MV AVERAGE E/E' RATIO: 13 CM/S
MV DECELERATION TIME: 243 MS
MV E'TISSUE VEL-LAT: 6.14 CM/S
MV E'TISSUE VEL-MED: 5.26 CM/S
MV LATERAL E/E' RATIO: 12.1
MV MEDIAL E/E' RATIO: 14.1
MV PEAK E VELOCITY: 74 CM/S
MV REGURGITANT VOLUME: 107.9 CC
NUC REST DIASTOLIC VOLUME INDEX: 2288 LBS
NUC REST SYSTOLIC VOLUME INDEX: 66 IN
PISA MR PEAK VEL: 524 CM/S
PR MAX PG: 9 MMHG
PR PEAK VELOCITY: 164 CM/S
TRICUSPID REGURGITATION PEAK PRESSURE GRADIENT: 34.8 MMHG
TRICUSPID VALVE ANULAR PLANE SYSTOLIC EXCURSION: 1.5 CM
TRICUSPID VALVE PEAK REGURGITANT VELOCITY: 295 CM/S

## 2018-12-20 ENCOUNTER — AMBULATORY - HEALTHEAST (OUTPATIENT)
Dept: CARDIOLOGY | Facility: CLINIC | Age: 83
End: 2018-12-20

## 2018-12-20 ENCOUNTER — OFFICE VISIT - HEALTHEAST (OUTPATIENT)
Dept: CARDIOLOGY | Facility: CLINIC | Age: 83
End: 2018-12-20

## 2018-12-20 DIAGNOSIS — I34.0 NON-RHEUMATIC MITRAL REGURGITATION: ICD-10-CM

## 2018-12-20 DIAGNOSIS — I10 ESSENTIAL HYPERTENSION, BENIGN: ICD-10-CM

## 2018-12-20 DIAGNOSIS — I48.20 CHRONIC ATRIAL FIBRILLATION (H): ICD-10-CM

## 2018-12-20 DIAGNOSIS — I48.91 ATRIAL FIBRILLATION (H): ICD-10-CM

## 2018-12-20 LAB — INTERNATIONAL NORMALIZATION RATIO, POC - HISTORICAL: 3.4

## 2018-12-20 ASSESSMENT — MIFFLIN-ST. JEOR: SCORE: 1281.39

## 2018-12-22 ENCOUNTER — AMBULATORY - HEALTHEAST (OUTPATIENT)
Dept: CARDIOLOGY | Facility: CLINIC | Age: 83
End: 2018-12-22

## 2018-12-22 DIAGNOSIS — I48.0 PAROXYSMAL ATRIAL FIBRILLATION (H): ICD-10-CM

## 2019-01-03 ENCOUNTER — AMBULATORY - HEALTHEAST (OUTPATIENT)
Dept: CARDIOLOGY | Facility: CLINIC | Age: 84
End: 2019-01-03

## 2019-01-03 DIAGNOSIS — I48.91 ATRIAL FIBRILLATION (H): ICD-10-CM

## 2019-01-03 LAB — INTERNATIONAL NORMALIZATION RATIO, POC - HISTORICAL: 3

## 2019-01-08 ENCOUNTER — COMMUNICATION - HEALTHEAST (OUTPATIENT)
Dept: CARDIOLOGY | Facility: CLINIC | Age: 84
End: 2019-01-08

## 2019-01-08 DIAGNOSIS — I25.10 CAD (CORONARY ARTERY DISEASE): ICD-10-CM

## 2019-01-08 DIAGNOSIS — I48.91 A-FIB (H): ICD-10-CM

## 2019-01-17 ENCOUNTER — AMBULATORY - HEALTHEAST (OUTPATIENT)
Dept: CARDIOLOGY | Facility: CLINIC | Age: 84
End: 2019-01-17

## 2019-01-17 DIAGNOSIS — I48.91 ATRIAL FIBRILLATION (H): ICD-10-CM

## 2019-01-17 LAB — INTERNATIONAL NORMALIZATION RATIO, POC - HISTORICAL: 2.1

## 2019-01-31 ENCOUNTER — AMBULATORY - HEALTHEAST (OUTPATIENT)
Dept: CARDIOLOGY | Facility: CLINIC | Age: 84
End: 2019-01-31

## 2019-01-31 DIAGNOSIS — I48.91 ATRIAL FIBRILLATION (H): ICD-10-CM

## 2019-01-31 LAB — INTERNATIONAL NORMALIZATION RATIO, POC - HISTORICAL: 1.7

## 2019-02-14 ENCOUNTER — AMBULATORY - HEALTHEAST (OUTPATIENT)
Dept: CARDIOLOGY | Facility: CLINIC | Age: 84
End: 2019-02-14

## 2019-02-14 ENCOUNTER — RECORDS - HEALTHEAST (OUTPATIENT)
Dept: LAB | Facility: CLINIC | Age: 84
End: 2019-02-14

## 2019-02-14 DIAGNOSIS — I48.91 ATRIAL FIBRILLATION (H): ICD-10-CM

## 2019-02-14 LAB
ALBUMIN SERPL-MCNC: 4.5 G/DL (ref 3.5–5)
ALP SERPL-CCNC: 32 U/L (ref 45–120)
ALT SERPL W P-5'-P-CCNC: 14 U/L (ref 0–45)
ANION GAP SERPL CALCULATED.3IONS-SCNC: 10 MMOL/L (ref 5–18)
AST SERPL W P-5'-P-CCNC: 27 U/L (ref 0–40)
BASOPHILS # BLD AUTO: 0 THOU/UL (ref 0–0.2)
BASOPHILS NFR BLD AUTO: 1 % (ref 0–2)
BILIRUB SERPL-MCNC: 1 MG/DL (ref 0–1)
BUN SERPL-MCNC: 42 MG/DL (ref 8–28)
CALCIUM SERPL-MCNC: 10.7 MG/DL (ref 8.5–10.5)
CHLORIDE BLD-SCNC: 108 MMOL/L (ref 98–107)
CHOLEST SERPL-MCNC: 134 MG/DL
CO2 SERPL-SCNC: 24 MMOL/L (ref 22–31)
CREAT SERPL-MCNC: 1.32 MG/DL (ref 0.7–1.3)
EOSINOPHIL # BLD AUTO: 0.1 THOU/UL (ref 0–0.4)
EOSINOPHIL NFR BLD AUTO: 2 % (ref 0–6)
ERYTHROCYTE [DISTWIDTH] IN BLOOD BY AUTOMATED COUNT: 13.2 % (ref 11–14.5)
FASTING STATUS PATIENT QL REPORTED: NORMAL
GFR SERPL CREATININE-BSD FRML MDRD: 52 ML/MIN/1.73M2
GLUCOSE BLD-MCNC: 95 MG/DL (ref 70–125)
HCT VFR BLD AUTO: 36.5 % (ref 40–54)
HDLC SERPL-MCNC: 52 MG/DL
HGB BLD-MCNC: 11.6 G/DL (ref 14–18)
INTERNATIONAL NORMALIZATION RATIO, POC - HISTORICAL: 2.3
LDLC SERPL CALC-MCNC: 71 MG/DL
LYMPHOCYTES # BLD AUTO: 0.8 THOU/UL (ref 0.8–4.4)
LYMPHOCYTES NFR BLD AUTO: 16 % (ref 20–40)
MCH RBC QN AUTO: 31.3 PG (ref 27–34)
MCHC RBC AUTO-ENTMCNC: 31.8 G/DL (ref 32–36)
MCV RBC AUTO: 98 FL (ref 80–100)
MONOCYTES # BLD AUTO: 0.6 THOU/UL (ref 0–0.9)
MONOCYTES NFR BLD AUTO: 11 % (ref 2–10)
NEUTROPHILS # BLD AUTO: 3.6 THOU/UL (ref 2–7.7)
NEUTROPHILS NFR BLD AUTO: 71 % (ref 50–70)
PLATELET # BLD AUTO: 170 THOU/UL (ref 140–440)
PMV BLD AUTO: 10.8 FL (ref 8.5–12.5)
POTASSIUM BLD-SCNC: 4.3 MMOL/L (ref 3.5–5)
PROT SERPL-MCNC: 7 G/DL (ref 6–8)
RBC # BLD AUTO: 3.71 MILL/UL (ref 4.4–6.2)
SODIUM SERPL-SCNC: 142 MMOL/L (ref 136–145)
TRIGL SERPL-MCNC: 56 MG/DL
TSH SERPL DL<=0.005 MIU/L-ACNC: 1.2 UIU/ML (ref 0.3–5)
WBC: 5.1 THOU/UL (ref 4–11)

## 2019-02-15 LAB — 25(OH)D3 SERPL-MCNC: 41.8 NG/ML (ref 30–80)

## 2019-03-14 ENCOUNTER — AMBULATORY - HEALTHEAST (OUTPATIENT)
Dept: CARDIOLOGY | Facility: CLINIC | Age: 84
End: 2019-03-14

## 2019-03-14 DIAGNOSIS — I48.91 ATRIAL FIBRILLATION (H): ICD-10-CM

## 2019-03-14 LAB — INTERNATIONAL NORMALIZATION RATIO, POC - HISTORICAL: 1.6

## 2019-03-28 ENCOUNTER — AMBULATORY - HEALTHEAST (OUTPATIENT)
Dept: CARDIOLOGY | Facility: CLINIC | Age: 84
End: 2019-03-28

## 2019-03-28 DIAGNOSIS — I48.91 ATRIAL FIBRILLATION (H): ICD-10-CM

## 2019-03-28 LAB — INTERNATIONAL NORMALIZATION RATIO, POC - HISTORICAL: 2

## 2019-04-08 ENCOUNTER — COMMUNICATION - HEALTHEAST (OUTPATIENT)
Dept: CARDIOLOGY | Facility: CLINIC | Age: 84
End: 2019-04-08

## 2019-04-08 DIAGNOSIS — I48.91 A-FIB (H): ICD-10-CM

## 2019-04-23 ENCOUNTER — AMBULATORY - HEALTHEAST (OUTPATIENT)
Dept: CARDIOLOGY | Facility: CLINIC | Age: 84
End: 2019-04-23

## 2019-04-23 DIAGNOSIS — I48.91 ATRIAL FIBRILLATION (H): ICD-10-CM

## 2019-04-23 LAB — INTERNATIONAL NORMALIZATION RATIO, POC - HISTORICAL: 2.3

## 2019-05-15 ENCOUNTER — COMMUNICATION - HEALTHEAST (OUTPATIENT)
Dept: ANTICOAGULATION | Facility: CLINIC | Age: 84
End: 2019-05-15

## 2019-05-15 DIAGNOSIS — I48.91 ATRIAL FIBRILLATION (H): ICD-10-CM

## 2019-05-24 ENCOUNTER — COMMUNICATION - HEALTHEAST (OUTPATIENT)
Dept: CARDIOLOGY | Facility: CLINIC | Age: 84
End: 2019-05-24

## 2019-05-24 DIAGNOSIS — I48.91 ATRIAL FIBRILLATION (H): ICD-10-CM

## 2019-05-28 ENCOUNTER — AMBULATORY - HEALTHEAST (OUTPATIENT)
Dept: CARDIOLOGY | Facility: CLINIC | Age: 84
End: 2019-05-28

## 2019-05-28 ENCOUNTER — COMMUNICATION - HEALTHEAST (OUTPATIENT)
Dept: SCHEDULING | Facility: CLINIC | Age: 84
End: 2019-05-28

## 2019-05-28 ENCOUNTER — COMMUNICATION - HEALTHEAST (OUTPATIENT)
Dept: ANTICOAGULATION | Facility: CLINIC | Age: 84
End: 2019-05-28

## 2019-05-28 DIAGNOSIS — I48.91 ATRIAL FIBRILLATION (H): ICD-10-CM

## 2019-05-28 LAB — POC INR - HE - HISTORICAL: 2.5 (ref 0.9–1.1)

## 2019-06-25 ENCOUNTER — AMBULATORY - HEALTHEAST (OUTPATIENT)
Dept: CARDIOLOGY | Facility: CLINIC | Age: 84
End: 2019-06-25

## 2019-06-25 ENCOUNTER — COMMUNICATION - HEALTHEAST (OUTPATIENT)
Dept: ANTICOAGULATION | Facility: CLINIC | Age: 84
End: 2019-06-25

## 2019-06-25 DIAGNOSIS — I48.91 ATRIAL FIBRILLATION (H): ICD-10-CM

## 2019-06-25 LAB — POC INR - HE - HISTORICAL: 2.5 (ref 0.9–1.1)

## 2019-07-02 ENCOUNTER — COMMUNICATION - HEALTHEAST (OUTPATIENT)
Dept: CARDIOLOGY | Facility: CLINIC | Age: 84
End: 2019-07-02

## 2019-07-02 ENCOUNTER — COMMUNICATION - HEALTHEAST (OUTPATIENT)
Dept: ANTICOAGULATION | Facility: CLINIC | Age: 84
End: 2019-07-02

## 2019-07-02 DIAGNOSIS — I48.91 A-FIB (H): ICD-10-CM

## 2019-07-23 ENCOUNTER — COMMUNICATION - HEALTHEAST (OUTPATIENT)
Dept: ANTICOAGULATION | Facility: CLINIC | Age: 84
End: 2019-07-23

## 2019-07-23 ENCOUNTER — AMBULATORY - HEALTHEAST (OUTPATIENT)
Dept: CARDIOLOGY | Facility: CLINIC | Age: 84
End: 2019-07-23

## 2019-07-23 DIAGNOSIS — I48.91 ATRIAL FIBRILLATION (H): ICD-10-CM

## 2019-07-23 LAB — POC INR - HE - HISTORICAL: 2.9 (ref 0.9–1.1)

## 2019-08-02 ENCOUNTER — AMBULATORY - HEALTHEAST (OUTPATIENT)
Dept: CARDIOLOGY | Facility: CLINIC | Age: 84
End: 2019-08-02

## 2019-08-02 ENCOUNTER — RECORDS - HEALTHEAST (OUTPATIENT)
Dept: ADMINISTRATIVE | Facility: OTHER | Age: 84
End: 2019-08-02

## 2019-08-09 ENCOUNTER — OFFICE VISIT - HEALTHEAST (OUTPATIENT)
Dept: CARDIOLOGY | Facility: CLINIC | Age: 84
End: 2019-08-09

## 2019-08-09 DIAGNOSIS — I25.10 CORONARY ARTERY DISEASE INVOLVING NATIVE CORONARY ARTERY OF NATIVE HEART WITHOUT ANGINA PECTORIS: ICD-10-CM

## 2019-08-09 DIAGNOSIS — I35.9 AORTIC VALVE DISORDER: ICD-10-CM

## 2019-08-09 DIAGNOSIS — I34.0 MITRAL INSUFFICIENCY: ICD-10-CM

## 2019-08-09 DIAGNOSIS — Z51.5 END OF LIFE CARE: ICD-10-CM

## 2019-08-09 DIAGNOSIS — I48.20 CHRONIC ATRIAL FIBRILLATION (H): ICD-10-CM

## 2019-08-09 ASSESSMENT — MIFFLIN-ST. JEOR: SCORE: 1213.35

## 2019-08-20 ENCOUNTER — AMBULATORY - HEALTHEAST (OUTPATIENT)
Dept: CARDIOLOGY | Facility: CLINIC | Age: 84
End: 2019-08-20

## 2019-08-20 ENCOUNTER — COMMUNICATION - HEALTHEAST (OUTPATIENT)
Dept: ANTICOAGULATION | Facility: CLINIC | Age: 84
End: 2019-08-20

## 2019-08-20 DIAGNOSIS — I48.91 ATRIAL FIBRILLATION (H): ICD-10-CM

## 2019-08-20 DIAGNOSIS — I48.20 CHRONIC ATRIAL FIBRILLATION (H): ICD-10-CM

## 2019-08-20 LAB — POC INR - HE - HISTORICAL: 3.4 (ref 0.9–1.1)

## 2019-09-03 ENCOUNTER — AMBULATORY - HEALTHEAST (OUTPATIENT)
Dept: CARDIOLOGY | Facility: CLINIC | Age: 84
End: 2019-09-03

## 2019-09-03 ENCOUNTER — COMMUNICATION - HEALTHEAST (OUTPATIENT)
Dept: ANTICOAGULATION | Facility: CLINIC | Age: 84
End: 2019-09-03

## 2019-09-03 DIAGNOSIS — I48.20 CHRONIC ATRIAL FIBRILLATION (H): ICD-10-CM

## 2019-09-03 DIAGNOSIS — I48.91 ATRIAL FIBRILLATION (H): ICD-10-CM

## 2019-09-03 LAB — POC INR - HE - HISTORICAL: 3.6 (ref 0.9–1.1)

## 2019-09-11 ENCOUNTER — COMMUNICATION - HEALTHEAST (OUTPATIENT)
Dept: ANTICOAGULATION | Facility: CLINIC | Age: 84
End: 2019-09-11

## 2019-09-11 ENCOUNTER — AMBULATORY - HEALTHEAST (OUTPATIENT)
Dept: CARDIOLOGY | Facility: CLINIC | Age: 84
End: 2019-09-11

## 2019-09-11 DIAGNOSIS — I48.91 ATRIAL FIBRILLATION (H): ICD-10-CM

## 2019-09-11 DIAGNOSIS — I48.20 CHRONIC ATRIAL FIBRILLATION (H): ICD-10-CM

## 2019-09-11 LAB — POC INR - HE - HISTORICAL: 2.8 (ref 0.9–1.1)

## 2019-09-26 ENCOUNTER — AMBULATORY - HEALTHEAST (OUTPATIENT)
Dept: CARDIOLOGY | Facility: CLINIC | Age: 84
End: 2019-09-26

## 2019-09-26 ENCOUNTER — COMMUNICATION - HEALTHEAST (OUTPATIENT)
Dept: ANTICOAGULATION | Facility: CLINIC | Age: 84
End: 2019-09-26

## 2019-09-26 DIAGNOSIS — I48.91 ATRIAL FIBRILLATION (H): ICD-10-CM

## 2019-09-26 DIAGNOSIS — I48.91 A-FIB (H): ICD-10-CM

## 2019-09-26 LAB — POC INR - HE - HISTORICAL: 2.5 (ref 0.9–1.1)

## 2019-09-27 ENCOUNTER — COMMUNICATION - HEALTHEAST (OUTPATIENT)
Dept: CARDIOLOGY | Facility: CLINIC | Age: 84
End: 2019-09-27

## 2019-09-27 DIAGNOSIS — I48.91 A-FIB (H): ICD-10-CM

## 2019-10-01 ENCOUNTER — RECORDS - HEALTHEAST (OUTPATIENT)
Dept: LAB | Facility: CLINIC | Age: 84
End: 2019-10-01

## 2019-10-01 LAB
ALBUMIN SERPL-MCNC: 3.8 G/DL (ref 3.5–5)
ALP SERPL-CCNC: 40 U/L (ref 45–120)
ALT SERPL W P-5'-P-CCNC: 11 U/L (ref 0–45)
ANION GAP SERPL CALCULATED.3IONS-SCNC: 10 MMOL/L (ref 5–18)
AST SERPL W P-5'-P-CCNC: 20 U/L (ref 0–40)
BILIRUB SERPL-MCNC: 0.6 MG/DL (ref 0–1)
BUN SERPL-MCNC: 34 MG/DL (ref 8–28)
CALCIUM SERPL-MCNC: 10.2 MG/DL (ref 8.5–10.5)
CHLORIDE BLD-SCNC: 110 MMOL/L (ref 98–107)
CHOLEST SERPL-MCNC: 113 MG/DL
CO2 SERPL-SCNC: 22 MMOL/L (ref 22–31)
CREAT SERPL-MCNC: 1.15 MG/DL (ref 0.7–1.3)
FASTING STATUS PATIENT QL REPORTED: NORMAL
GFR SERPL CREATININE-BSD FRML MDRD: >60 ML/MIN/1.73M2
GLUCOSE BLD-MCNC: 89 MG/DL (ref 70–125)
HDLC SERPL-MCNC: 44 MG/DL
LDLC SERPL CALC-MCNC: 56 MG/DL
POTASSIUM BLD-SCNC: 4.4 MMOL/L (ref 3.5–5)
PROT SERPL-MCNC: 6.7 G/DL (ref 6–8)
SODIUM SERPL-SCNC: 142 MMOL/L (ref 136–145)
TRIGL SERPL-MCNC: 63 MG/DL
TSH SERPL DL<=0.005 MIU/L-ACNC: 1.32 UIU/ML (ref 0.3–5)

## 2019-10-02 LAB — 25(OH)D3 SERPL-MCNC: 50.1 NG/ML (ref 30–80)

## 2019-10-18 ENCOUNTER — COMMUNICATION - HEALTHEAST (OUTPATIENT)
Dept: ANTICOAGULATION | Facility: CLINIC | Age: 84
End: 2019-10-18

## 2019-10-18 ENCOUNTER — AMBULATORY - HEALTHEAST (OUTPATIENT)
Dept: CARDIOLOGY | Facility: CLINIC | Age: 84
End: 2019-10-18

## 2019-10-18 DIAGNOSIS — I48.91 ATRIAL FIBRILLATION (H): ICD-10-CM

## 2019-10-18 LAB — POC INR - HE - HISTORICAL: 2 (ref 0.9–1.1)

## 2019-11-01 ENCOUNTER — COMMUNICATION - HEALTHEAST (OUTPATIENT)
Dept: ANTICOAGULATION | Facility: CLINIC | Age: 84
End: 2019-11-01

## 2019-11-01 ENCOUNTER — AMBULATORY - HEALTHEAST (OUTPATIENT)
Dept: CARDIOLOGY | Facility: CLINIC | Age: 84
End: 2019-11-01

## 2019-11-01 DIAGNOSIS — I48.91 ATRIAL FIBRILLATION (H): ICD-10-CM

## 2019-11-01 LAB — POC INR - HE - HISTORICAL: 2.5 (ref 0.9–1.1)

## 2019-11-29 ENCOUNTER — AMBULATORY - HEALTHEAST (OUTPATIENT)
Dept: CARDIOLOGY | Facility: CLINIC | Age: 84
End: 2019-11-29

## 2019-11-29 ENCOUNTER — COMMUNICATION - HEALTHEAST (OUTPATIENT)
Dept: ANTICOAGULATION | Facility: CLINIC | Age: 84
End: 2019-11-29

## 2019-11-29 DIAGNOSIS — I48.91 ATRIAL FIBRILLATION (H): ICD-10-CM

## 2019-11-29 LAB — POC INR - HE - HISTORICAL: 2.8 (ref 0.9–1.1)

## 2019-12-18 ENCOUNTER — COMMUNICATION - HEALTHEAST (OUTPATIENT)
Dept: ANTICOAGULATION | Facility: CLINIC | Age: 84
End: 2019-12-18

## 2019-12-18 DIAGNOSIS — I48.91 ATRIAL FIBRILLATION (H): ICD-10-CM

## 2019-12-27 ENCOUNTER — COMMUNICATION - HEALTHEAST (OUTPATIENT)
Dept: ANTICOAGULATION | Facility: CLINIC | Age: 84
End: 2019-12-27

## 2019-12-27 ENCOUNTER — AMBULATORY - HEALTHEAST (OUTPATIENT)
Dept: CARDIOLOGY | Facility: CLINIC | Age: 84
End: 2019-12-27

## 2019-12-27 DIAGNOSIS — I48.91 ATRIAL FIBRILLATION (H): ICD-10-CM

## 2019-12-27 LAB — POC INR - HE - HISTORICAL: 2.5 (ref 0.9–1.1)

## 2020-02-07 ENCOUNTER — AMBULATORY - HEALTHEAST (OUTPATIENT)
Dept: CARDIOLOGY | Facility: CLINIC | Age: 85
End: 2020-02-07

## 2020-02-07 ENCOUNTER — COMMUNICATION - HEALTHEAST (OUTPATIENT)
Dept: ANTICOAGULATION | Facility: CLINIC | Age: 85
End: 2020-02-07

## 2020-02-07 DIAGNOSIS — I48.91 ATRIAL FIBRILLATION (H): ICD-10-CM

## 2020-02-07 LAB — POC INR - HE - HISTORICAL: 3.4 (ref 0.9–1.1)

## 2020-02-19 ENCOUNTER — RECORDS - HEALTHEAST (OUTPATIENT)
Dept: ADMINISTRATIVE | Facility: OTHER | Age: 85
End: 2020-02-19

## 2020-02-19 ENCOUNTER — AMBULATORY - HEALTHEAST (OUTPATIENT)
Dept: CARDIOLOGY | Facility: CLINIC | Age: 85
End: 2020-02-19

## 2020-02-25 ENCOUNTER — AMBULATORY - HEALTHEAST (OUTPATIENT)
Dept: CARDIOLOGY | Facility: CLINIC | Age: 85
End: 2020-02-25

## 2020-02-25 DIAGNOSIS — I48.91 ATRIAL FIBRILLATION (H): ICD-10-CM

## 2020-02-25 LAB — POC INR - HE - HISTORICAL: 2.9 (ref 0.9–1.1)

## 2020-03-03 ENCOUNTER — COMMUNICATION - HEALTHEAST (OUTPATIENT)
Dept: ANTICOAGULATION | Facility: CLINIC | Age: 85
End: 2020-03-03

## 2020-03-03 DIAGNOSIS — I48.91 ATRIAL FIBRILLATION (H): ICD-10-CM

## 2020-03-11 ENCOUNTER — COMMUNICATION - HEALTHEAST (OUTPATIENT)
Dept: CARDIOLOGY | Facility: CLINIC | Age: 85
End: 2020-03-11

## 2020-03-11 DIAGNOSIS — I48.91 A-FIB (H): ICD-10-CM

## 2020-03-12 ENCOUNTER — RECORDS - HEALTHEAST (OUTPATIENT)
Dept: LAB | Facility: CLINIC | Age: 85
End: 2020-03-12

## 2020-03-12 ENCOUNTER — RECORDS - HEALTHEAST (OUTPATIENT)
Dept: ADMINISTRATIVE | Facility: OTHER | Age: 85
End: 2020-03-12

## 2020-03-12 LAB
ALBUMIN SERPL-MCNC: 4.3 G/DL (ref 3.5–5)
ALP SERPL-CCNC: 28 U/L (ref 45–120)
ALT SERPL W P-5'-P-CCNC: 16 U/L (ref 0–45)
ANION GAP SERPL CALCULATED.3IONS-SCNC: 10 MMOL/L (ref 5–18)
AST SERPL W P-5'-P-CCNC: 30 U/L (ref 0–40)
BASOPHILS # BLD AUTO: 0 THOU/UL (ref 0–0.2)
BASOPHILS NFR BLD AUTO: 1 % (ref 0–2)
BILIRUB SERPL-MCNC: 0.8 MG/DL (ref 0–1)
BUN SERPL-MCNC: 31 MG/DL (ref 8–28)
CALCIUM SERPL-MCNC: 10.6 MG/DL (ref 8.5–10.5)
CHLORIDE BLD-SCNC: 108 MMOL/L (ref 98–107)
CHOLEST SERPL-MCNC: 124 MG/DL
CO2 SERPL-SCNC: 24 MMOL/L (ref 22–31)
CREAT SERPL-MCNC: 1.28 MG/DL (ref 0.7–1.3)
EOSINOPHIL # BLD AUTO: 0.1 THOU/UL (ref 0–0.4)
EOSINOPHIL NFR BLD AUTO: 2 % (ref 0–6)
ERYTHROCYTE [DISTWIDTH] IN BLOOD BY AUTOMATED COUNT: 14.2 % (ref 11–14.5)
FASTING STATUS PATIENT QL REPORTED: YES
GFR SERPL CREATININE-BSD FRML MDRD: 54 ML/MIN/1.73M2
GLUCOSE BLD-MCNC: 86 MG/DL (ref 70–125)
HCT VFR BLD AUTO: 37.5 % (ref 40–54)
HDLC SERPL-MCNC: 49 MG/DL
HGB BLD-MCNC: 12 G/DL (ref 14–18)
LDLC SERPL CALC-MCNC: 62 MG/DL
LYMPHOCYTES # BLD AUTO: 0.8 THOU/UL (ref 0.8–4.4)
LYMPHOCYTES NFR BLD AUTO: 16 % (ref 20–40)
MCH RBC QN AUTO: 31 PG (ref 27–34)
MCHC RBC AUTO-ENTMCNC: 32 G/DL (ref 32–36)
MCV RBC AUTO: 97 FL (ref 80–100)
MONOCYTES # BLD AUTO: 0.5 THOU/UL (ref 0–0.9)
MONOCYTES NFR BLD AUTO: 10 % (ref 2–10)
NEUTROPHILS # BLD AUTO: 3.4 THOU/UL (ref 2–7.7)
NEUTROPHILS NFR BLD AUTO: 71 % (ref 50–70)
PLATELET # BLD AUTO: 170 THOU/UL (ref 140–440)
PMV BLD AUTO: 10.9 FL (ref 8.5–12.5)
POTASSIUM BLD-SCNC: 4.1 MMOL/L (ref 3.5–5)
PROT SERPL-MCNC: 6.9 G/DL (ref 6–8)
RBC # BLD AUTO: 3.87 MILL/UL (ref 4.4–6.2)
SODIUM SERPL-SCNC: 142 MMOL/L (ref 136–145)
TRIGL SERPL-MCNC: 67 MG/DL
TSH SERPL DL<=0.005 MIU/L-ACNC: 1.16 UIU/ML (ref 0.3–5)
WBC: 4.8 THOU/UL (ref 4–11)

## 2020-03-13 ENCOUNTER — AMBULATORY - HEALTHEAST (OUTPATIENT)
Dept: SURGERY | Facility: CLINIC | Age: 85
End: 2020-03-13

## 2020-03-13 DIAGNOSIS — K40.90 LEFT INGUINAL HERNIA: ICD-10-CM

## 2020-03-17 ENCOUNTER — AMBULATORY - HEALTHEAST (OUTPATIENT)
Dept: CARDIOLOGY | Facility: CLINIC | Age: 85
End: 2020-03-17

## 2020-03-17 ENCOUNTER — COMMUNICATION - HEALTHEAST (OUTPATIENT)
Dept: ANTICOAGULATION | Facility: CLINIC | Age: 85
End: 2020-03-17

## 2020-03-17 DIAGNOSIS — I48.91 ATRIAL FIBRILLATION (H): ICD-10-CM

## 2020-03-17 LAB — POC INR - HE - HISTORICAL: 2.6 (ref 0.9–1.1)

## 2020-03-18 ENCOUNTER — AMBULATORY - HEALTHEAST (OUTPATIENT)
Dept: SURGERY | Facility: CLINIC | Age: 85
End: 2020-03-18

## 2020-03-20 ENCOUNTER — RECORDS - HEALTHEAST (OUTPATIENT)
Dept: ADMINISTRATIVE | Facility: OTHER | Age: 85
End: 2020-03-20

## 2020-03-25 ENCOUNTER — RECORDS - HEALTHEAST (OUTPATIENT)
Dept: ADMINISTRATIVE | Facility: OTHER | Age: 85
End: 2020-03-25

## 2020-03-25 ENCOUNTER — AMBULATORY - HEALTHEAST (OUTPATIENT)
Dept: CARDIOLOGY | Facility: CLINIC | Age: 85
End: 2020-03-25

## 2020-03-30 ENCOUNTER — OFFICE VISIT - HEALTHEAST (OUTPATIENT)
Dept: CARDIOLOGY | Facility: CLINIC | Age: 85
End: 2020-03-30

## 2020-03-30 DIAGNOSIS — I25.10 CORONARY ARTERY DISEASE INVOLVING NATIVE CORONARY ARTERY OF NATIVE HEART WITHOUT ANGINA PECTORIS: ICD-10-CM

## 2020-03-30 DIAGNOSIS — I34.0 NONRHEUMATIC MITRAL VALVE REGURGITATION: ICD-10-CM

## 2020-03-30 DIAGNOSIS — I35.9 AORTIC VALVE DISORDER: ICD-10-CM

## 2020-03-30 DIAGNOSIS — I48.91 ATRIAL FIBRILLATION (H): ICD-10-CM

## 2020-04-20 ENCOUNTER — COMMUNICATION - HEALTHEAST (OUTPATIENT)
Dept: CARDIOLOGY | Facility: CLINIC | Age: 85
End: 2020-04-20

## 2020-04-21 ENCOUNTER — COMMUNICATION - HEALTHEAST (OUTPATIENT)
Dept: ANTICOAGULATION | Facility: CLINIC | Age: 85
End: 2020-04-21

## 2020-04-21 ENCOUNTER — AMBULATORY - HEALTHEAST (OUTPATIENT)
Dept: CARDIOLOGY | Facility: CLINIC | Age: 85
End: 2020-04-21

## 2020-04-21 DIAGNOSIS — I48.91 ATRIAL FIBRILLATION (H): ICD-10-CM

## 2020-04-21 LAB — POC INR - HE - HISTORICAL: 2.3 (ref 0.9–1.1)

## 2020-06-02 ENCOUNTER — COMMUNICATION - HEALTHEAST (OUTPATIENT)
Dept: CARDIOLOGY | Facility: CLINIC | Age: 85
End: 2020-06-02

## 2020-06-03 ENCOUNTER — AMBULATORY - HEALTHEAST (OUTPATIENT)
Dept: CARDIOLOGY | Facility: CLINIC | Age: 85
End: 2020-06-03

## 2020-06-03 ENCOUNTER — COMMUNICATION - HEALTHEAST (OUTPATIENT)
Dept: ANTICOAGULATION | Facility: CLINIC | Age: 85
End: 2020-06-03

## 2020-06-03 DIAGNOSIS — I48.91 ATRIAL FIBRILLATION (H): ICD-10-CM

## 2020-06-03 LAB — POC INR - HE - HISTORICAL: 2.2 (ref 0.9–1.1)

## 2020-07-10 ENCOUNTER — RECORDS - HEALTHEAST (OUTPATIENT)
Dept: LAB | Facility: CLINIC | Age: 85
End: 2020-07-10

## 2020-07-10 LAB
ALBUMIN SERPL-MCNC: 4.3 G/DL (ref 3.5–5)
ALP SERPL-CCNC: 36 U/L (ref 45–120)
ALT SERPL W P-5'-P-CCNC: 15 U/L (ref 0–45)
ANION GAP SERPL CALCULATED.3IONS-SCNC: 12 MMOL/L (ref 5–18)
AST SERPL W P-5'-P-CCNC: 31 U/L (ref 0–40)
BASOPHILS # BLD AUTO: 0 THOU/UL (ref 0–0.2)
BASOPHILS NFR BLD AUTO: 1 % (ref 0–2)
BILIRUB SERPL-MCNC: 0.7 MG/DL (ref 0–1)
BUN SERPL-MCNC: 44 MG/DL (ref 8–28)
CALCIUM SERPL-MCNC: 11.2 MG/DL (ref 8.5–10.5)
CHLORIDE BLD-SCNC: 107 MMOL/L (ref 98–107)
CHOLEST SERPL-MCNC: 124 MG/DL
CO2 SERPL-SCNC: 25 MMOL/L (ref 22–31)
CREAT SERPL-MCNC: 1.68 MG/DL (ref 0.7–1.3)
EOSINOPHIL # BLD AUTO: 0.1 THOU/UL (ref 0–0.4)
EOSINOPHIL NFR BLD AUTO: 1 % (ref 0–6)
ERYTHROCYTE [DISTWIDTH] IN BLOOD BY AUTOMATED COUNT: 12.8 % (ref 11–14.5)
FASTING STATUS PATIENT QL REPORTED: YES
GFR SERPL CREATININE-BSD FRML MDRD: 39 ML/MIN/1.73M2
GLUCOSE BLD-MCNC: 101 MG/DL (ref 70–125)
HCT VFR BLD AUTO: 38.4 % (ref 40–54)
HDLC SERPL-MCNC: 46 MG/DL
HGB BLD-MCNC: 12.3 G/DL (ref 14–18)
LDLC SERPL CALC-MCNC: 61 MG/DL
LYMPHOCYTES # BLD AUTO: 0.9 THOU/UL (ref 0.8–4.4)
LYMPHOCYTES NFR BLD AUTO: 14 % (ref 20–40)
MCH RBC QN AUTO: 31.7 PG (ref 27–34)
MCHC RBC AUTO-ENTMCNC: 32 G/DL (ref 32–36)
MCV RBC AUTO: 99 FL (ref 80–100)
MONOCYTES # BLD AUTO: 0.6 THOU/UL (ref 0–0.9)
MONOCYTES NFR BLD AUTO: 10 % (ref 2–10)
NEUTROPHILS # BLD AUTO: 4.6 THOU/UL (ref 2–7.7)
NEUTROPHILS NFR BLD AUTO: 74 % (ref 50–70)
PLATELET # BLD AUTO: 199 THOU/UL (ref 140–440)
PMV BLD AUTO: 11 FL (ref 8.5–12.5)
POTASSIUM BLD-SCNC: 3.8 MMOL/L (ref 3.5–5)
PROT SERPL-MCNC: 7 G/DL (ref 6–8)
RBC # BLD AUTO: 3.88 MILL/UL (ref 4.4–6.2)
SODIUM SERPL-SCNC: 144 MMOL/L (ref 136–145)
TRIGL SERPL-MCNC: 85 MG/DL
WBC: 6.2 THOU/UL (ref 4–11)

## 2020-07-15 ENCOUNTER — COMMUNICATION - HEALTHEAST (OUTPATIENT)
Dept: ANTICOAGULATION | Facility: CLINIC | Age: 85
End: 2020-07-15

## 2020-07-15 ENCOUNTER — AMBULATORY - HEALTHEAST (OUTPATIENT)
Dept: CARDIOLOGY | Facility: CLINIC | Age: 85
End: 2020-07-15

## 2020-07-15 DIAGNOSIS — I48.91 ATRIAL FIBRILLATION (H): ICD-10-CM

## 2020-07-15 LAB — POC INR - HE - HISTORICAL: 4.2 (ref 0.9–1.1)

## 2020-07-23 ENCOUNTER — COMMUNICATION - HEALTHEAST (OUTPATIENT)
Dept: CARDIOLOGY | Facility: CLINIC | Age: 85
End: 2020-07-23

## 2020-07-24 ENCOUNTER — AMBULATORY - HEALTHEAST (OUTPATIENT)
Dept: CARDIOLOGY | Facility: CLINIC | Age: 85
End: 2020-07-24

## 2020-07-24 ENCOUNTER — COMMUNICATION - HEALTHEAST (OUTPATIENT)
Dept: ANTICOAGULATION | Facility: CLINIC | Age: 85
End: 2020-07-24

## 2020-07-24 DIAGNOSIS — I48.91 ATRIAL FIBRILLATION (H): ICD-10-CM

## 2020-07-24 LAB — POC INR - HE - HISTORICAL: 5.4 (ref 0.9–1.1)

## 2020-07-28 ENCOUNTER — COMMUNICATION - HEALTHEAST (OUTPATIENT)
Dept: CARDIOLOGY | Facility: CLINIC | Age: 85
End: 2020-07-28

## 2020-07-29 ENCOUNTER — COMMUNICATION - HEALTHEAST (OUTPATIENT)
Dept: ANTICOAGULATION | Facility: CLINIC | Age: 85
End: 2020-07-29

## 2020-07-29 ENCOUNTER — AMBULATORY - HEALTHEAST (OUTPATIENT)
Dept: CARDIOLOGY | Facility: CLINIC | Age: 85
End: 2020-07-29

## 2020-07-29 DIAGNOSIS — I48.91 ATRIAL FIBRILLATION (H): ICD-10-CM

## 2020-07-29 LAB — POC INR - HE - HISTORICAL: 3.6 (ref 0.9–1.1)

## 2020-08-04 ENCOUNTER — COMMUNICATION - HEALTHEAST (OUTPATIENT)
Dept: CARDIOLOGY | Facility: CLINIC | Age: 85
End: 2020-08-04

## 2020-08-05 ENCOUNTER — COMMUNICATION - HEALTHEAST (OUTPATIENT)
Dept: ANTICOAGULATION | Facility: CLINIC | Age: 85
End: 2020-08-05

## 2020-08-05 ENCOUNTER — AMBULATORY - HEALTHEAST (OUTPATIENT)
Dept: CARDIOLOGY | Facility: CLINIC | Age: 85
End: 2020-08-05

## 2020-08-05 DIAGNOSIS — I48.91 ATRIAL FIBRILLATION (H): ICD-10-CM

## 2020-08-05 LAB — POC INR - HE - HISTORICAL: 3.5 (ref 0.9–1.1)

## 2020-08-11 ENCOUNTER — COMMUNICATION - HEALTHEAST (OUTPATIENT)
Dept: CARDIOLOGY | Facility: CLINIC | Age: 85
End: 2020-08-11

## 2020-08-12 ASSESSMENT — MIFFLIN-ST. JEOR: SCORE: 1222.88

## 2020-08-13 ASSESSMENT — MIFFLIN-ST. JEOR: SCORE: 1214.26

## 2020-08-14 ENCOUNTER — SURGERY - HEALTHEAST (OUTPATIENT)
Dept: SURGERY | Facility: HOSPITAL | Age: 85
End: 2020-08-14

## 2020-08-14 ENCOUNTER — COMMUNICATION - HEALTHEAST (OUTPATIENT)
Dept: SCHEDULING | Facility: CLINIC | Age: 85
End: 2020-08-14

## 2020-08-14 ENCOUNTER — ANESTHESIA - HEALTHEAST (OUTPATIENT)
Dept: SURGERY | Facility: HOSPITAL | Age: 85
End: 2020-08-14

## 2020-08-14 ASSESSMENT — MIFFLIN-ST. JEOR: SCORE: 1208.36

## 2020-08-15 ASSESSMENT — MIFFLIN-ST. JEOR: SCORE: 1249.64

## 2020-08-16 ASSESSMENT — MIFFLIN-ST. JEOR: SCORE: 1241.48

## 2020-08-18 ENCOUNTER — AMBULATORY - HEALTHEAST (OUTPATIENT)
Dept: ANTICOAGULATION | Facility: CLINIC | Age: 85
End: 2020-08-18

## 2020-08-18 DIAGNOSIS — I48.20 CHRONIC ATRIAL FIBRILLATION (H): ICD-10-CM

## 2020-08-19 ENCOUNTER — RECORDS - HEALTHEAST (OUTPATIENT)
Dept: LAB | Facility: CLINIC | Age: 85
End: 2020-08-19

## 2020-08-19 ENCOUNTER — OFFICE VISIT - HEALTHEAST (OUTPATIENT)
Dept: GERIATRICS | Facility: CLINIC | Age: 85
End: 2020-08-19

## 2020-08-19 DIAGNOSIS — J18.9 PNEUMONIA OF LEFT LOWER LOBE DUE TO INFECTIOUS ORGANISM: ICD-10-CM

## 2020-08-19 DIAGNOSIS — E44.0 MODERATE PROTEIN-CALORIE MALNUTRITION (H): ICD-10-CM

## 2020-08-19 DIAGNOSIS — G47.33 OBSTRUCTIVE SLEEP APNEA: ICD-10-CM

## 2020-08-19 DIAGNOSIS — D62 ACUTE BLOOD LOSS ANEMIA: ICD-10-CM

## 2020-08-19 DIAGNOSIS — K92.2 LOWER GI BLEED: ICD-10-CM

## 2020-08-19 DIAGNOSIS — N18.30 KIDNEY DISEASE, CHRONIC, STAGE III (GFR 30-59 ML/MIN) (H): ICD-10-CM

## 2020-08-19 DIAGNOSIS — I48.20 CHRONIC ATRIAL FIBRILLATION (H): ICD-10-CM

## 2020-08-20 ENCOUNTER — COMMUNICATION - HEALTHEAST (OUTPATIENT)
Dept: GERIATRICS | Facility: CLINIC | Age: 85
End: 2020-08-20

## 2020-08-20 LAB
ERYTHROCYTE [DISTWIDTH] IN BLOOD BY AUTOMATED COUNT: 12.9 % (ref 11–14.5)
HCT VFR BLD AUTO: 26.3 % (ref 40–54)
HGB BLD-MCNC: 8.5 G/DL (ref 14–18)
INR PPP: 2.17 (ref 0.9–1.1)
MCH RBC QN AUTO: 31.4 PG (ref 27–34)
MCHC RBC AUTO-ENTMCNC: 32.3 G/DL (ref 32–36)
MCV RBC AUTO: 97 FL (ref 80–100)
PLATELET # BLD AUTO: 228 THOU/UL (ref 140–440)
PMV BLD AUTO: 10.2 FL (ref 8.5–12.5)
RBC # BLD AUTO: 2.71 MILL/UL (ref 4.4–6.2)
WBC: 5.9 THOU/UL (ref 4–11)

## 2020-08-21 ENCOUNTER — OFFICE VISIT - HEALTHEAST (OUTPATIENT)
Dept: GERIATRICS | Facility: CLINIC | Age: 85
End: 2020-08-21

## 2020-08-21 DIAGNOSIS — D62 ACUTE BLOOD LOSS ANEMIA: ICD-10-CM

## 2020-08-21 DIAGNOSIS — K92.2 LOWER GI BLEED: ICD-10-CM

## 2020-08-21 DIAGNOSIS — E44.0 MODERATE PROTEIN-CALORIE MALNUTRITION (H): ICD-10-CM

## 2020-08-21 DIAGNOSIS — I25.10 CORONARY ARTERY DISEASE INVOLVING NATIVE CORONARY ARTERY OF NATIVE HEART WITHOUT ANGINA PECTORIS: ICD-10-CM

## 2020-08-21 DIAGNOSIS — I10 ESSENTIAL HYPERTENSION, BENIGN: ICD-10-CM

## 2020-08-21 DIAGNOSIS — I48.20 CHRONIC ATRIAL FIBRILLATION (H): ICD-10-CM

## 2020-08-23 ENCOUNTER — RECORDS - HEALTHEAST (OUTPATIENT)
Dept: LAB | Facility: CLINIC | Age: 85
End: 2020-08-23

## 2020-08-24 ENCOUNTER — COMMUNICATION - HEALTHEAST (OUTPATIENT)
Dept: GERIATRICS | Facility: CLINIC | Age: 85
End: 2020-08-24

## 2020-08-24 ENCOUNTER — OFFICE VISIT - HEALTHEAST (OUTPATIENT)
Dept: GERIATRICS | Facility: CLINIC | Age: 85
End: 2020-08-24

## 2020-08-24 DIAGNOSIS — M79.671 PAIN OF RIGHT HEEL: ICD-10-CM

## 2020-08-24 DIAGNOSIS — I48.20 CHRONIC ATRIAL FIBRILLATION (H): ICD-10-CM

## 2020-08-24 DIAGNOSIS — K92.2 LOWER GI BLEED: ICD-10-CM

## 2020-08-24 DIAGNOSIS — E44.0 MODERATE PROTEIN-CALORIE MALNUTRITION (H): ICD-10-CM

## 2020-08-24 DIAGNOSIS — Z91.89 AT HIGH RISK FOR PRESSURE INJURY OF SKIN: ICD-10-CM

## 2020-08-24 DIAGNOSIS — D62 ACUTE BLOOD LOSS ANEMIA: ICD-10-CM

## 2020-08-24 LAB
ALBUMIN SERPL-MCNC: 2.5 G/DL (ref 3.5–5)
ALP SERPL-CCNC: 55 U/L (ref 45–120)
ALT SERPL W P-5'-P-CCNC: 22 U/L (ref 0–45)
ANION GAP SERPL CALCULATED.3IONS-SCNC: 8 MMOL/L (ref 5–18)
AST SERPL W P-5'-P-CCNC: 26 U/L (ref 0–40)
BASOPHILS # BLD AUTO: 0 THOU/UL (ref 0–0.2)
BASOPHILS NFR BLD AUTO: 1 % (ref 0–2)
BILIRUB SERPL-MCNC: 0.4 MG/DL (ref 0–1)
BUN SERPL-MCNC: 26 MG/DL (ref 8–28)
CALCIUM SERPL-MCNC: 10.3 MG/DL (ref 8.5–10.5)
CHLORIDE BLD-SCNC: 112 MMOL/L (ref 98–107)
CO2 SERPL-SCNC: 21 MMOL/L (ref 22–31)
CREAT SERPL-MCNC: 0.79 MG/DL (ref 0.7–1.3)
EOSINOPHIL # BLD AUTO: 0.3 THOU/UL (ref 0–0.4)
EOSINOPHIL NFR BLD AUTO: 7 % (ref 0–6)
ERYTHROCYTE [DISTWIDTH] IN BLOOD BY AUTOMATED COUNT: 13.2 % (ref 11–14.5)
GFR SERPL CREATININE-BSD FRML MDRD: >60 ML/MIN/1.73M2
GLUCOSE BLD-MCNC: 78 MG/DL (ref 70–125)
HCT VFR BLD AUTO: 26.4 % (ref 40–54)
HGB BLD-MCNC: 8.4 G/DL (ref 14–18)
IMM GRANULOCYTES # BLD: 0.1 THOU/UL
IMM GRANULOCYTES NFR BLD: 2 %
INR PPP: 2.22 (ref 0.9–1.1)
LYMPHOCYTES # BLD AUTO: 1.1 THOU/UL (ref 0.8–4.4)
LYMPHOCYTES NFR BLD AUTO: 24 % (ref 20–40)
MCH RBC QN AUTO: 31.5 PG (ref 27–34)
MCHC RBC AUTO-ENTMCNC: 31.8 G/DL (ref 32–36)
MCV RBC AUTO: 99 FL (ref 80–100)
MONOCYTES # BLD AUTO: 0.5 THOU/UL (ref 0–0.9)
MONOCYTES NFR BLD AUTO: 10 % (ref 2–10)
NEUTROPHILS # BLD AUTO: 2.5 THOU/UL (ref 2–7.7)
NEUTROPHILS NFR BLD AUTO: 56 % (ref 50–70)
PLATELET # BLD AUTO: 266 THOU/UL (ref 140–440)
PMV BLD AUTO: 9.9 FL (ref 8.5–12.5)
POTASSIUM BLD-SCNC: 3.7 MMOL/L (ref 3.5–5)
PROT SERPL-MCNC: 5.3 G/DL (ref 6–8)
RBC # BLD AUTO: 2.67 MILL/UL (ref 4.4–6.2)
SODIUM SERPL-SCNC: 141 MMOL/L (ref 136–145)
WBC: 4.4 THOU/UL (ref 4–11)

## 2020-08-26 ENCOUNTER — RECORDS - HEALTHEAST (OUTPATIENT)
Dept: LAB | Facility: CLINIC | Age: 85
End: 2020-08-26

## 2020-08-27 ENCOUNTER — COMMUNICATION - HEALTHEAST (OUTPATIENT)
Dept: GERIATRICS | Facility: CLINIC | Age: 85
End: 2020-08-27

## 2020-08-27 LAB — INR PPP: 2.08 (ref 0.9–1.1)

## 2020-08-28 ENCOUNTER — OFFICE VISIT - HEALTHEAST (OUTPATIENT)
Dept: GERIATRICS | Facility: CLINIC | Age: 85
End: 2020-08-28

## 2020-08-28 DIAGNOSIS — I48.20 CHRONIC ATRIAL FIBRILLATION (H): ICD-10-CM

## 2020-08-28 DIAGNOSIS — E44.0 MODERATE PROTEIN-CALORIE MALNUTRITION (H): ICD-10-CM

## 2020-08-28 DIAGNOSIS — N18.30 KIDNEY DISEASE, CHRONIC, STAGE III (GFR 30-59 ML/MIN) (H): ICD-10-CM

## 2020-08-28 DIAGNOSIS — K92.2 LOWER GI BLEED: ICD-10-CM

## 2020-08-28 DIAGNOSIS — G47.33 OBSTRUCTIVE SLEEP APNEA: ICD-10-CM

## 2020-08-28 DIAGNOSIS — I25.10 CORONARY ARTERY DISEASE INVOLVING NATIVE CORONARY ARTERY OF NATIVE HEART WITHOUT ANGINA PECTORIS: ICD-10-CM

## 2020-08-30 ENCOUNTER — RECORDS - HEALTHEAST (OUTPATIENT)
Dept: LAB | Facility: CLINIC | Age: 85
End: 2020-08-30

## 2020-08-31 ENCOUNTER — RECORDS - HEALTHEAST (OUTPATIENT)
Dept: LAB | Facility: CLINIC | Age: 85
End: 2020-08-31

## 2020-08-31 ENCOUNTER — COMMUNICATION - HEALTHEAST (OUTPATIENT)
Dept: GERIATRICS | Facility: CLINIC | Age: 85
End: 2020-08-31

## 2020-08-31 LAB — INR PPP: 1.68 (ref 0.9–1.1)

## 2020-09-01 ENCOUNTER — OFFICE VISIT - HEALTHEAST (OUTPATIENT)
Dept: GERIATRICS | Facility: CLINIC | Age: 85
End: 2020-09-01

## 2020-09-01 DIAGNOSIS — I48.20 CHRONIC ATRIAL FIBRILLATION (H): ICD-10-CM

## 2020-09-01 DIAGNOSIS — D62 ACUTE BLOOD LOSS ANEMIA: ICD-10-CM

## 2020-09-01 DIAGNOSIS — I10 ESSENTIAL HYPERTENSION, BENIGN: ICD-10-CM

## 2020-09-01 DIAGNOSIS — R52 PAIN MANAGEMENT: ICD-10-CM

## 2020-09-01 LAB
BASOPHILS # BLD AUTO: 0 THOU/UL (ref 0–0.2)
BASOPHILS NFR BLD AUTO: 1 % (ref 0–2)
EOSINOPHIL # BLD AUTO: 0.4 THOU/UL (ref 0–0.4)
EOSINOPHIL NFR BLD AUTO: 6 % (ref 0–6)
ERYTHROCYTE [DISTWIDTH] IN BLOOD BY AUTOMATED COUNT: 13.4 % (ref 11–14.5)
HCT VFR BLD AUTO: 29.6 % (ref 40–54)
HGB BLD-MCNC: 9.2 G/DL (ref 14–18)
IMM GRANULOCYTES # BLD: 0 THOU/UL
IMM GRANULOCYTES NFR BLD: 1 %
LYMPHOCYTES # BLD AUTO: 0.8 THOU/UL (ref 0.8–4.4)
LYMPHOCYTES NFR BLD AUTO: 15 % (ref 20–40)
MCH RBC QN AUTO: 30.5 PG (ref 27–34)
MCHC RBC AUTO-ENTMCNC: 31.1 G/DL (ref 32–36)
MCV RBC AUTO: 98 FL (ref 80–100)
MONOCYTES # BLD AUTO: 0.6 THOU/UL (ref 0–0.9)
MONOCYTES NFR BLD AUTO: 11 % (ref 2–10)
NEUTROPHILS # BLD AUTO: 3.8 THOU/UL (ref 2–7.7)
NEUTROPHILS NFR BLD AUTO: 67 % (ref 50–70)
PLATELET # BLD AUTO: 235 THOU/UL (ref 140–440)
PMV BLD AUTO: 10.2 FL (ref 8.5–12.5)
RBC # BLD AUTO: 3.02 MILL/UL (ref 4.4–6.2)
WBC: 5.7 THOU/UL (ref 4–11)

## 2020-09-03 ENCOUNTER — OFFICE VISIT - HEALTHEAST (OUTPATIENT)
Dept: GERIATRICS | Facility: CLINIC | Age: 85
End: 2020-09-03

## 2020-09-03 DIAGNOSIS — D64.9 ANEMIA, UNSPECIFIED TYPE: ICD-10-CM

## 2020-09-03 DIAGNOSIS — R52 PAIN MANAGEMENT: ICD-10-CM

## 2020-09-08 ENCOUNTER — COMMUNICATION - HEALTHEAST (OUTPATIENT)
Dept: GERIATRICS | Facility: CLINIC | Age: 85
End: 2020-09-08

## 2020-09-08 ENCOUNTER — OFFICE VISIT - HEALTHEAST (OUTPATIENT)
Dept: GERIATRICS | Facility: CLINIC | Age: 85
End: 2020-09-08

## 2020-09-08 ENCOUNTER — RECORDS - HEALTHEAST (OUTPATIENT)
Dept: LAB | Facility: CLINIC | Age: 85
End: 2020-09-08

## 2020-09-08 DIAGNOSIS — I10 ESSENTIAL HYPERTENSION, BENIGN: ICD-10-CM

## 2020-09-08 DIAGNOSIS — M54.50 CHRONIC LOW BACK PAIN, UNSPECIFIED BACK PAIN LATERALITY, UNSPECIFIED WHETHER SCIATICA PRESENT: ICD-10-CM

## 2020-09-08 DIAGNOSIS — K92.2 LOWER GI BLEED: ICD-10-CM

## 2020-09-08 DIAGNOSIS — G89.29 CHRONIC LOW BACK PAIN, UNSPECIFIED BACK PAIN LATERALITY, UNSPECIFIED WHETHER SCIATICA PRESENT: ICD-10-CM

## 2020-09-09 ENCOUNTER — COMMUNICATION - HEALTHEAST (OUTPATIENT)
Dept: GERIATRICS | Facility: CLINIC | Age: 85
End: 2020-09-09

## 2020-09-09 LAB — INR PPP: 1.99 (ref 0.9–1.1)

## 2020-09-10 ENCOUNTER — AMBULATORY - HEALTHEAST (OUTPATIENT)
Dept: GERIATRICS | Facility: CLINIC | Age: 85
End: 2020-09-10

## 2020-09-12 ENCOUNTER — RECORDS - HEALTHEAST (OUTPATIENT)
Dept: LAB | Facility: CLINIC | Age: 85
End: 2020-09-12

## 2020-09-12 LAB
ALBUMIN UR-MCNC: NEGATIVE MG/DL
APPEARANCE UR: ABNORMAL
BACTERIA #/AREA URNS HPF: ABNORMAL HPF
BILIRUB UR QL STRIP: NEGATIVE
COLOR UR AUTO: YELLOW
GLUCOSE UR STRIP-MCNC: NEGATIVE MG/DL
HGB UR QL STRIP: ABNORMAL
KETONES UR STRIP-MCNC: NEGATIVE MG/DL
LEUKOCYTE ESTERASE UR QL STRIP: NEGATIVE
MUCOUS THREADS #/AREA URNS LPF: ABNORMAL LPF
NITRATE UR QL: NEGATIVE
PH UR STRIP: 6.5 [PH] (ref 4.5–8)
RBC #/AREA URNS AUTO: >100 HPF
SP GR UR STRIP: 1.02 (ref 1–1.03)
SQUAMOUS #/AREA URNS AUTO: ABNORMAL LPF
UROBILINOGEN UR STRIP-ACNC: ABNORMAL
WBC #/AREA URNS AUTO: ABNORMAL HPF

## 2020-09-14 ENCOUNTER — RECORDS - HEALTHEAST (OUTPATIENT)
Dept: ADMINISTRATIVE | Facility: OTHER | Age: 85
End: 2020-09-14

## 2020-09-14 ENCOUNTER — AMBULATORY - HEALTHEAST (OUTPATIENT)
Dept: CARDIOLOGY | Facility: CLINIC | Age: 85
End: 2020-09-14

## 2020-09-14 LAB — BACTERIA SPEC CULT: NO GROWTH

## 2020-09-15 ENCOUNTER — COMMUNICATION - HEALTHEAST (OUTPATIENT)
Dept: SCHEDULING | Facility: CLINIC | Age: 85
End: 2020-09-15

## 2020-09-15 DIAGNOSIS — I48.20 CHRONIC ATRIAL FIBRILLATION (H): ICD-10-CM

## 2020-09-15 LAB — INR PPP: 2.6 (ref 0.9–1.1)

## 2020-09-16 ENCOUNTER — COMMUNICATION - HEALTHEAST (OUTPATIENT)
Dept: CARDIOLOGY | Facility: CLINIC | Age: 85
End: 2020-09-16

## 2020-09-17 ENCOUNTER — OFFICE VISIT - HEALTHEAST (OUTPATIENT)
Dept: CARDIOLOGY | Facility: CLINIC | Age: 85
End: 2020-09-17

## 2020-09-17 DIAGNOSIS — I35.9 AORTIC VALVE DISORDER: ICD-10-CM

## 2020-09-17 DIAGNOSIS — I34.0 MITRAL INSUFFICIENCY: ICD-10-CM

## 2020-09-17 DIAGNOSIS — I10 ESSENTIAL HYPERTENSION, BENIGN: ICD-10-CM

## 2020-09-17 ASSESSMENT — MIFFLIN-ST. JEOR: SCORE: 1204.28

## 2020-09-22 ENCOUNTER — COMMUNICATION - HEALTHEAST (OUTPATIENT)
Dept: SCHEDULING | Facility: CLINIC | Age: 85
End: 2020-09-22

## 2020-09-22 ENCOUNTER — COMMUNICATION - HEALTHEAST (OUTPATIENT)
Dept: CARDIOLOGY | Facility: CLINIC | Age: 85
End: 2020-09-22

## 2020-09-22 DIAGNOSIS — I48.20 CHRONIC ATRIAL FIBRILLATION (H): ICD-10-CM

## 2020-09-22 LAB — INR PPP: 2.8 (ref 0.9–1.1)

## 2020-09-25 ENCOUNTER — RECORDS - HEALTHEAST (OUTPATIENT)
Dept: LAB | Facility: CLINIC | Age: 85
End: 2020-09-25

## 2020-09-25 LAB
ALBUMIN UR-MCNC: ABNORMAL MG/DL
APPEARANCE UR: ABNORMAL
BACTERIA #/AREA URNS HPF: ABNORMAL HPF
BILIRUB UR QL STRIP: NEGATIVE
CAOX CRY #/AREA URNS HPF: PRESENT /[HPF]
COLOR UR AUTO: YELLOW
GLUCOSE UR STRIP-MCNC: NEGATIVE MG/DL
HGB UR QL STRIP: ABNORMAL
HYALINE CASTS #/AREA URNS LPF: ABNORMAL LPF
KETONES UR STRIP-MCNC: NEGATIVE MG/DL
LEUKOCYTE ESTERASE UR QL STRIP: NEGATIVE
MUCOUS THREADS #/AREA URNS LPF: ABNORMAL LPF
NITRATE UR QL: NEGATIVE
PH UR STRIP: 5.5 [PH] (ref 4.5–8)
RBC #/AREA URNS AUTO: ABNORMAL HPF
SP GR UR STRIP: 1.02 (ref 1–1.03)
SQUAMOUS #/AREA URNS AUTO: ABNORMAL LPF
URATE CRY #/AREA URNS HPF: PRESENT /[HPF]
UROBILINOGEN UR STRIP-ACNC: ABNORMAL
WBC #/AREA URNS AUTO: ABNORMAL HPF

## 2020-09-26 LAB — BACTERIA SPEC CULT: NORMAL

## 2020-10-13 ENCOUNTER — COMMUNICATION - HEALTHEAST (OUTPATIENT)
Dept: ADMINISTRATIVE | Facility: CLINIC | Age: 85
End: 2020-10-13

## 2020-10-13 DIAGNOSIS — I48.20 CHRONIC ATRIAL FIBRILLATION (H): ICD-10-CM

## 2020-10-13 LAB — INR PPP: 1.2 (ref 0.9–1.1)

## 2020-10-20 ENCOUNTER — COMMUNICATION - HEALTHEAST (OUTPATIENT)
Dept: SCHEDULING | Facility: CLINIC | Age: 85
End: 2020-10-20

## 2020-10-20 ENCOUNTER — COMMUNICATION - HEALTHEAST (OUTPATIENT)
Dept: ADMINISTRATIVE | Facility: CLINIC | Age: 85
End: 2020-10-20

## 2020-10-20 DIAGNOSIS — I48.20 CHRONIC ATRIAL FIBRILLATION (H): ICD-10-CM

## 2020-10-20 LAB — INR PPP: 1.6 (ref 0.9–1.1)

## 2020-10-27 ENCOUNTER — COMMUNICATION - HEALTHEAST (OUTPATIENT)
Dept: SCHEDULING | Facility: CLINIC | Age: 85
End: 2020-10-27

## 2020-10-27 DIAGNOSIS — I48.20 CHRONIC ATRIAL FIBRILLATION (H): ICD-10-CM

## 2020-10-27 LAB — INR PPP: 2.5 (ref 0.9–1.1)

## 2020-10-28 ENCOUNTER — COMMUNICATION - HEALTHEAST (OUTPATIENT)
Dept: SCHEDULING | Facility: CLINIC | Age: 85
End: 2020-10-28

## 2020-10-28 DIAGNOSIS — I48.20 CHRONIC ATRIAL FIBRILLATION (H): ICD-10-CM

## 2020-11-03 ENCOUNTER — COMMUNICATION - HEALTHEAST (OUTPATIENT)
Dept: SCHEDULING | Facility: CLINIC | Age: 85
End: 2020-11-03

## 2020-11-03 DIAGNOSIS — I48.20 CHRONIC ATRIAL FIBRILLATION (H): ICD-10-CM

## 2020-11-03 LAB — INR PPP: 2.9 (ref 0.9–1.1)

## 2020-11-10 ENCOUNTER — COMMUNICATION - HEALTHEAST (OUTPATIENT)
Dept: ADMINISTRATIVE | Facility: CLINIC | Age: 85
End: 2020-11-10

## 2020-11-10 DIAGNOSIS — I48.20 CHRONIC ATRIAL FIBRILLATION (H): ICD-10-CM

## 2020-11-10 LAB — INR PPP: 2.9 (ref 0.9–1.1)

## 2020-11-24 ENCOUNTER — COMMUNICATION - HEALTHEAST (OUTPATIENT)
Dept: SCHEDULING | Facility: CLINIC | Age: 85
End: 2020-11-24

## 2020-11-24 DIAGNOSIS — I48.20 CHRONIC ATRIAL FIBRILLATION (H): ICD-10-CM

## 2020-11-24 LAB — INR PPP: 2.6 (ref 0.9–1.1)

## 2020-12-08 ENCOUNTER — COMMUNICATION - HEALTHEAST (OUTPATIENT)
Dept: ADMINISTRATIVE | Facility: CLINIC | Age: 85
End: 2020-12-08

## 2020-12-08 DIAGNOSIS — I48.20 CHRONIC ATRIAL FIBRILLATION (H): ICD-10-CM

## 2020-12-08 LAB — INR PPP: 2.7 (ref 0.9–1.1)

## 2020-12-10 ENCOUNTER — AMBULATORY - HEALTHEAST (OUTPATIENT)
Dept: ANTICOAGULATION | Facility: CLINIC | Age: 85
End: 2020-12-10

## 2020-12-10 DIAGNOSIS — I48.20 CHRONIC ATRIAL FIBRILLATION (H): ICD-10-CM

## 2020-12-14 ENCOUNTER — RECORDS - HEALTHEAST (OUTPATIENT)
Dept: LAB | Facility: CLINIC | Age: 85
End: 2020-12-14

## 2020-12-17 LAB
ALBUMIN SERPL-MCNC: 3.8 G/DL (ref 3.5–5)
ALP SERPL-CCNC: 82 U/L (ref 45–120)
ALT SERPL W P-5'-P-CCNC: 14 U/L (ref 0–45)
ANION GAP SERPL CALCULATED.3IONS-SCNC: 10 MMOL/L (ref 5–18)
AST SERPL W P-5'-P-CCNC: 22 U/L (ref 0–40)
BILIRUB SERPL-MCNC: 0.7 MG/DL (ref 0–1)
BUN SERPL-MCNC: 24 MG/DL (ref 8–28)
CALCIUM SERPL-MCNC: 9.8 MG/DL (ref 8.5–10.5)
CHLORIDE BLD-SCNC: 110 MMOL/L (ref 98–107)
CHOLEST SERPL-MCNC: 155 MG/DL
CO2 SERPL-SCNC: 22 MMOL/L (ref 22–31)
CREAT SERPL-MCNC: 0.89 MG/DL (ref 0.7–1.3)
ERYTHROCYTE [DISTWIDTH] IN BLOOD BY AUTOMATED COUNT: 16.5 % (ref 11–14.5)
FASTING STATUS PATIENT QL REPORTED: ABNORMAL
FERRITIN SERPL-MCNC: 1416 NG/ML (ref 27–300)
GFR SERPL CREATININE-BSD FRML MDRD: >60 ML/MIN/1.73M2
GLUCOSE BLD-MCNC: 95 MG/DL (ref 70–125)
HCT VFR BLD AUTO: 41.2 % (ref 40–54)
HDLC SERPL-MCNC: 46 MG/DL
HGB BLD-MCNC: 13.4 G/DL (ref 14–18)
LDLC SERPL CALC-MCNC: 73 MG/DL
MCH RBC QN AUTO: 30.3 PG (ref 27–34)
MCHC RBC AUTO-ENTMCNC: 32.5 G/DL (ref 32–36)
MCV RBC AUTO: 93 FL (ref 80–100)
PLATELET # BLD AUTO: 170 THOU/UL (ref 140–440)
PMV BLD AUTO: 10.3 FL (ref 8.5–12.5)
POTASSIUM BLD-SCNC: 4.6 MMOL/L (ref 3.5–5)
PROT SERPL-MCNC: 6.5 G/DL (ref 6–8)
RBC # BLD AUTO: 4.42 MILL/UL (ref 4.4–6.2)
SODIUM SERPL-SCNC: 142 MMOL/L (ref 136–145)
TRIGL SERPL-MCNC: 179 MG/DL
TSH SERPL DL<=0.005 MIU/L-ACNC: 1.24 UIU/ML (ref 0.3–5)
VIT B12 SERPL-MCNC: 537 PG/ML (ref 213–816)
WBC: 5.4 THOU/UL (ref 4–11)

## 2020-12-18 LAB — 25(OH)D3 SERPL-MCNC: 27.4 NG/ML (ref 30–80)

## 2020-12-22 ENCOUNTER — COMMUNICATION - HEALTHEAST (OUTPATIENT)
Dept: ADMINISTRATIVE | Facility: CLINIC | Age: 85
End: 2020-12-22

## 2020-12-22 DIAGNOSIS — I48.20 CHRONIC ATRIAL FIBRILLATION (H): ICD-10-CM

## 2020-12-22 LAB — INR PPP: 2.3 (ref 0.9–1.1)

## 2021-01-05 ENCOUNTER — COMMUNICATION - HEALTHEAST (OUTPATIENT)
Dept: SCHEDULING | Facility: CLINIC | Age: 86
End: 2021-01-05

## 2021-01-05 DIAGNOSIS — I48.20 CHRONIC ATRIAL FIBRILLATION (H): ICD-10-CM

## 2021-01-05 LAB — INR PPP: 2.4 (ref 0.9–1.1)

## 2021-01-24 ENCOUNTER — RECORDS - HEALTHEAST (OUTPATIENT)
Dept: LAB | Facility: CLINIC | Age: 86
End: 2021-01-24

## 2021-01-27 LAB — INR PPP: 1.74 (ref 0.9–1.1)

## 2021-01-31 ENCOUNTER — RECORDS - HEALTHEAST (OUTPATIENT)
Dept: LAB | Facility: CLINIC | Age: 86
End: 2021-01-31

## 2021-02-09 LAB — INR PPP: 2.19 (ref 0.9–1.1)

## 2021-02-20 ENCOUNTER — HOSPITAL ENCOUNTER (EMERGENCY)
Facility: CLINIC | Age: 86
Discharge: HOME | End: 2021-02-21
Attending: EMERGENCY MEDICINE | Admitting: EMERGENCY MEDICINE
Payer: MEDICARE

## 2021-02-20 DIAGNOSIS — E83.52 HYPERCALCEMIA: ICD-10-CM

## 2021-02-20 DIAGNOSIS — R79.1 SUPRATHERAPEUTIC INR: ICD-10-CM

## 2021-02-20 DIAGNOSIS — T50.Z95A VACCINE REACTION, INITIAL ENCOUNTER: ICD-10-CM

## 2021-02-20 LAB
ANION GAP SERPL CALCULATED.3IONS-SCNC: 9 MMOL/L (ref 3–14)
BASOPHILS # BLD AUTO: 0 10E9/L (ref 0–0.2)
BASOPHILS NFR BLD AUTO: 0.3 %
BUN SERPL-MCNC: 21 MG/DL (ref 7–30)
CALCIUM SERPL-MCNC: 10.5 MG/DL (ref 8.5–10.1)
CHLORIDE SERPL-SCNC: 110 MMOL/L (ref 94–109)
CO2 SERPL-SCNC: 21 MMOL/L (ref 20–32)
CREAT SERPL-MCNC: 0.87 MG/DL (ref 0.66–1.25)
DIFFERENTIAL METHOD BLD: ABNORMAL
EOSINOPHIL # BLD AUTO: 0 10E9/L (ref 0–0.7)
EOSINOPHIL NFR BLD AUTO: 0.4 %
ERYTHROCYTE [DISTWIDTH] IN BLOOD BY AUTOMATED COUNT: 14.3 % (ref 10–15)
GFR SERPL CREATININE-BSD FRML MDRD: 79 ML/MIN/{1.73_M2}
GLUCOSE SERPL-MCNC: 121 MG/DL (ref 70–99)
HCT VFR BLD AUTO: 41.7 % (ref 40–53)
HGB BLD-MCNC: 13.3 G/DL (ref 13.3–17.7)
IMM GRANULOCYTES # BLD: 0.1 10E9/L (ref 0–0.4)
IMM GRANULOCYTES NFR BLD: 0.5 %
INR PPP: 4.96 (ref 0.86–1.14)
INTERPRETATION ECG - MUSE: NORMAL
LYMPHOCYTES # BLD AUTO: 0.5 10E9/L (ref 0.8–5.3)
LYMPHOCYTES NFR BLD AUTO: 4.8 %
MCH RBC QN AUTO: 29.8 PG (ref 26.5–33)
MCHC RBC AUTO-ENTMCNC: 31.9 G/DL (ref 31.5–36.5)
MCV RBC AUTO: 93 FL (ref 78–100)
MONOCYTES # BLD AUTO: 1 10E9/L (ref 0–1.3)
MONOCYTES NFR BLD AUTO: 9.3 %
NEUTROPHILS # BLD AUTO: 9.2 10E9/L (ref 1.6–8.3)
NEUTROPHILS NFR BLD AUTO: 84.7 %
NRBC # BLD AUTO: 0 10*3/UL
NRBC BLD AUTO-RTO: 0 /100
PLATELET # BLD AUTO: 175 10E9/L (ref 150–450)
POTASSIUM SERPL-SCNC: 3.8 MMOL/L (ref 3.4–5.3)
RBC # BLD AUTO: 4.47 10E12/L (ref 4.4–5.9)
SODIUM SERPL-SCNC: 140 MMOL/L (ref 133–144)
TROPONIN I SERPL-MCNC: <0.015 UG/L (ref 0–0.04)
WBC # BLD AUTO: 10.8 10E9/L (ref 4–11)

## 2021-02-20 PROCEDURE — 84484 ASSAY OF TROPONIN QUANT: CPT | Performed by: EMERGENCY MEDICINE

## 2021-02-20 PROCEDURE — 99284 EMERGENCY DEPT VISIT MOD MDM: CPT | Mod: 25

## 2021-02-20 PROCEDURE — 85610 PROTHROMBIN TIME: CPT | Performed by: EMERGENCY MEDICINE

## 2021-02-20 PROCEDURE — 80048 BASIC METABOLIC PNL TOTAL CA: CPT | Performed by: EMERGENCY MEDICINE

## 2021-02-20 PROCEDURE — 93005 ELECTROCARDIOGRAM TRACING: CPT

## 2021-02-20 PROCEDURE — 85025 COMPLETE CBC W/AUTO DIFF WBC: CPT | Performed by: EMERGENCY MEDICINE

## 2021-02-20 RX ORDER — SODIUM CHLORIDE 9 MG/ML
INJECTION, SOLUTION INTRAVENOUS CONTINUOUS
Status: DISCONTINUED | OUTPATIENT
Start: 2021-02-20 | End: 2021-02-21 | Stop reason: HOSPADM

## 2021-02-20 ASSESSMENT — ENCOUNTER SYMPTOMS
NAUSEA: 0
CHILLS: 0
DIARRHEA: 1
WEAKNESS: 1
FATIGUE: 1
VOMITING: 0
SHORTNESS OF BREATH: 0
FEVER: 0
ABDOMINAL PAIN: 0
COUGH: 0

## 2021-02-21 ENCOUNTER — RECORDS - HEALTHEAST (OUTPATIENT)
Dept: LAB | Facility: CLINIC | Age: 86
End: 2021-02-21

## 2021-02-21 VITALS
OXYGEN SATURATION: 95 % | SYSTOLIC BLOOD PRESSURE: 127 MMHG | RESPIRATION RATE: 21 BRPM | DIASTOLIC BLOOD PRESSURE: 80 MMHG | TEMPERATURE: 98.9 F | HEART RATE: 88 BPM

## 2021-02-21 PROCEDURE — 250N000013 HC RX MED GY IP 250 OP 250 PS 637: Mod: GY | Performed by: EMERGENCY MEDICINE

## 2021-02-21 PROCEDURE — 258N000003 HC RX IP 258 OP 636: Performed by: EMERGENCY MEDICINE

## 2021-02-21 PROCEDURE — 96360 HYDRATION IV INFUSION INIT: CPT

## 2021-02-21 RX ORDER — ACETAMINOPHEN 325 MG/1
975 TABLET ORAL ONCE
Status: COMPLETED | OUTPATIENT
Start: 2021-02-21 | End: 2021-02-21

## 2021-02-21 RX ADMIN — SODIUM CHLORIDE 500 ML: 9 INJECTION, SOLUTION INTRAVENOUS at 00:08

## 2021-02-21 RX ADMIN — ACETAMINOPHEN 975 MG: 325 TABLET, FILM COATED ORAL at 01:56

## 2021-02-21 NOTE — ED PROVIDER NOTES
History   Chief Complaint:  Generalized Weakness and Diarrhea       HPI   Antonio White is a 85 year old male with a history of atrial fibrillation on Warfarin, CAD, anemia, HTN, and CKD who presents via EMS with weakness and diarrhea.  The patient reports several days of generalized weakness to the point where it was difficult to stand today.  He received his second COVID vaccine this morning at about 0900 and subsequently developed diarrhea.  He denies any chest pain, shortness of breath, cough, abdominal pain, fever, or chills.    Review of Systems   Constitutional: Positive for fatigue. Negative for chills and fever.   Respiratory: Negative for cough and shortness of breath.    Cardiovascular: Negative for chest pain.   Gastrointestinal: Positive for diarrhea. Negative for abdominal pain, nausea and vomiting.   Neurological: Positive for weakness.   All other systems reviewed and are negative.    Allergies:  Levaquin    Medications:  Pravastatin  Warfarin   Metoprolol   Polyethylene glycol   Lupron  Ferrous sulfate  Lidocaine patch    Past Medical History:    Coronary artery disease  Atrial fibrillation   Mitral insufficiency  Aortic regurgitation  Ascending aortic aneurysm   Prostate cancer   Lower GI bleed  Anemia  Hyperlipidemia  Hypertension   Obstructive sleep apnea    Osteoporosis  Chronic kidney disease, stage 3  Melanoma   Gastroesophageal reflux disease     Past Surgical History:    Hernia repair  Tonsillectomy with adenoidectomy   Cystoscopy   Partial hip arthroplasty   Mohs micrographic procedure     Family History:    Prostate cancer - father  Throat cancer - father  Hypertension - mother     Social History:  Presents to the ED alone  Living Situation: assisted living at [facility]    Physical Exam     Patient Vitals for the past 24 hrs:   BP Temp Temp src Pulse Resp SpO2   02/20/21 2249 136/75 98.9  F (37.2  C) Oral 85 8 95 %       Physical Exam  General: Alert, interactive in mild  distress  Head:  Scalp is atraumatic  Eyes:  The pupils are equal, round, and reactive to light    EOM's intact    No scleral icterus  ENT:      Nose:  The external nose is normal  Ears:  External ears are normal  Mouth/Throat: The oropharynx is normal    Mucus membranes are dry      Neck:  Normal range of motion.      There is no rigidity.    Trachea is in the midline         CV:  irregularly irregular rate/rhythm    2/6 murmur   Resp:  Breath sounds are clear bilaterally    Non-labored, no retractions or accessory muscle use      GI:  Abdomen is soft, no distension, no tenderness.       MS:  Normal strength in all 4 extremities  Skin:  Warm and dry, No rash or lesions noted.  Neuro: Strength 5/5 x4.    Psych:  Awake. Alert.  Normal affect.      Appropriate interactions.    Emergency Department Course   ECG:  ECG taken at 2245, ECG read at 2248  Atrial fibrillation versus flutter. Right bundle branch block. T-wave abnormality, consider lateral ischemia. Abnormal ECG.   Rate 87 bpm. DE interval * ms. QRS duration 1265 ms. QT/QTc 396/476 ms. P-R-T axes * -26 5.     Laboratory:  CBC: WNL (WBC 10.8, HGB 13.3, )    BMP: Cl 110 (H), Glucose 121 (H), Ca 10.5 (H), otherwise WNL (Creatinine 0.87)   INR: 4.96 (H)   Troponin I: <0.015     Emergency Department Course:    Reviewed:  I reviewed nursing notes, vitals, past medical history and Care Everywhere    Assessments:  (2250) I obtained history and examined the patient as noted above.   (2345) I spoke with the patient's daughter, updating her on patient's symptoms and workup   (0000) I rechecked the patient and explained findings.     Interventions:  (0008) Normal Saline, 500 mL, IV bolus      Disposition:  The patient was discharged to home.     Impression & Plan     Medical Decision Making:  Mr. White was seen and evaluated.  He presents with fatigue and diarrhea.  He had his second COVID vaccine today.  Laboratory workup was reassuring.  He does have a slight  hypercalcemia and his INR is supratherapeutic.  I have recommended he hold his Warfarin dose tomorrow.  Paramedics were concerned about his EKG however he has underlying atrial fibrillation with no signs of significant acute coronary syndrome.  He has no chest pain or shortness of breath, has a negative troponin, and I do not believe he needs further workup for this.  Electrolytes are otherwise reassuring.  Patient is feeling well.  I spoke with the daughter who is in agreement with discharge back to his care facility.  They will follow up with their primary care provider and return here is new symptoms develop.    Diagnosis:    ICD-10-CM    1. Vaccine reaction, initial encounter  T50.Z95A    2. Supratherapeutic INR  R79.1    3. Hypercalcemia  E83.52        Scribe Disclosure:  I, Sharonda Holt, am serving as a scribe at 10:49 PM on 2/20/2021 to document services personally performed by Cordell Newell MD based on my observations and the provider's statements to me.           Cordell Newell MD  02/21/21 9942

## 2021-02-21 NOTE — ED TRIAGE NOTES
From nursing home. Received second COVID vaccine this morning. Tonight reports diarrhea, weakness. EMS reports VSS en route.

## 2021-02-21 NOTE — ED NOTES
Report called to [facility]. RN updated on pending discharge back to facility. Facility phone number [phone]

## 2021-02-21 NOTE — ED NOTES
Bed: ST01  Expected date:   Expected time:   Means of arrival:   Comments:  [facility]  85M Abnormal EKG  2247

## 2021-02-21 NOTE — ED NOTES
Pt up to commode with the assist of RN and EDT. Pt was extremely unsteady on feet and unable to maintain gait unassisted. Pt was helped to commode and had a large BM. Pt helped back into bed.

## 2021-02-22 ENCOUNTER — RECORDS - HEALTHEAST (OUTPATIENT)
Dept: LAB | Facility: CLINIC | Age: 86
End: 2021-02-22

## 2021-02-22 LAB
ALBUMIN SERPL-MCNC: 3.3 G/DL (ref 3.5–5)
ALP SERPL-CCNC: 65 U/L (ref 45–120)
ALT SERPL W P-5'-P-CCNC: 14 U/L (ref 0–45)
ANION GAP SERPL CALCULATED.3IONS-SCNC: 10 MMOL/L (ref 5–18)
AST SERPL W P-5'-P-CCNC: 26 U/L (ref 0–40)
BILIRUB SERPL-MCNC: 0.7 MG/DL (ref 0–1)
BUN SERPL-MCNC: 28 MG/DL (ref 8–28)
CALCIUM SERPL-MCNC: 10.7 MG/DL (ref 8.5–10.5)
CHLORIDE BLD-SCNC: 111 MMOL/L (ref 98–107)
CO2 SERPL-SCNC: 21 MMOL/L (ref 22–31)
CREAT SERPL-MCNC: 1.09 MG/DL (ref 0.7–1.3)
ERYTHROCYTE [DISTWIDTH] IN BLOOD BY AUTOMATED COUNT: 14.5 % (ref 11–14.5)
GFR SERPL CREATININE-BSD FRML MDRD: >60 ML/MIN/1.73M2
GLUCOSE BLD-MCNC: 109 MG/DL (ref 70–125)
HCT VFR BLD AUTO: 39.8 % (ref 40–54)
HGB BLD-MCNC: 12.5 G/DL (ref 14–18)
INR PPP: 6.81 (ref 0.9–1.1)
MCH RBC QN AUTO: 30 PG (ref 27–34)
MCHC RBC AUTO-ENTMCNC: 31.4 G/DL (ref 32–36)
MCV RBC AUTO: 95 FL (ref 80–100)
PLATELET # BLD AUTO: 153 THOU/UL (ref 140–440)
PMV BLD AUTO: 10.7 FL (ref 8.5–12.5)
POTASSIUM BLD-SCNC: 3.6 MMOL/L (ref 3.5–5)
PROT SERPL-MCNC: 6.4 G/DL (ref 6–8)
RBC # BLD AUTO: 4.17 MILL/UL (ref 4.4–6.2)
SODIUM SERPL-SCNC: 142 MMOL/L (ref 136–145)
TSH SERPL DL<=0.005 MIU/L-ACNC: 1.28 UIU/ML (ref 0.3–5)
WBC: 7.5 THOU/UL (ref 4–11)

## 2021-02-23 ENCOUNTER — RECORDS - HEALTHEAST (OUTPATIENT)
Dept: LAB | Facility: CLINIC | Age: 86
End: 2021-02-23

## 2021-02-23 LAB — INR PPP: 5.9 (ref 0.9–1.1)

## 2021-02-24 LAB — INR PPP: 1.61 (ref 0.9–1.1)

## 2021-02-26 ENCOUNTER — RECORDS - HEALTHEAST (OUTPATIENT)
Dept: LAB | Facility: CLINIC | Age: 86
End: 2021-02-26

## 2021-03-01 LAB — INR PPP: 1.76 (ref 0.9–1.1)

## 2021-03-04 ENCOUNTER — RECORDS - HEALTHEAST (OUTPATIENT)
Dept: LAB | Facility: CLINIC | Age: 86
End: 2021-03-04

## 2021-03-05 LAB — INR PPP: 1.78 (ref 0.9–1.1)

## 2021-03-08 ENCOUNTER — RECORDS - HEALTHEAST (OUTPATIENT)
Dept: LAB | Facility: CLINIC | Age: 86
End: 2021-03-08

## 2021-03-09 LAB — INR PPP: 1.71 (ref 0.9–1.1)

## 2021-03-13 ENCOUNTER — RECORDS - HEALTHEAST (OUTPATIENT)
Dept: LAB | Facility: CLINIC | Age: 86
End: 2021-03-13

## 2021-03-13 LAB
SHIGA TOXIN 1: NEGATIVE
SHIGA TOXIN 2: NEGATIVE

## 2021-03-14 ENCOUNTER — RECORDS - HEALTHEAST (OUTPATIENT)
Dept: LAB | Facility: CLINIC | Age: 86
End: 2021-03-14

## 2021-03-15 LAB
BACTERIA SPEC CULT: NORMAL
C DIFF TOX B STL QL: POSITIVE
RIBOTYPE 027/NAP1/BI: ABNORMAL

## 2021-03-16 LAB — INR PPP: 2.88 (ref 0.9–1.1)

## 2021-03-20 ENCOUNTER — RECORDS - HEALTHEAST (OUTPATIENT)
Dept: LAB | Facility: CLINIC | Age: 86
End: 2021-03-20

## 2021-03-23 LAB — INR PPP: 2.43 (ref 0.9–1.1)

## 2021-03-28 ENCOUNTER — RECORDS - HEALTHEAST (OUTPATIENT)
Dept: LAB | Facility: CLINIC | Age: 86
End: 2021-03-28

## 2021-03-30 LAB — INR PPP: 1.8 (ref 0.9–1.1)

## 2021-04-05 ENCOUNTER — RECORDS - HEALTHEAST (OUTPATIENT)
Dept: LAB | Facility: CLINIC | Age: 86
End: 2021-04-05

## 2021-04-06 LAB — INR PPP: 2.08 (ref 0.9–1.1)

## 2021-04-12 ENCOUNTER — RECORDS - HEALTHEAST (OUTPATIENT)
Dept: LAB | Facility: CLINIC | Age: 86
End: 2021-04-12

## 2021-04-13 LAB
ALBUMIN SERPL-MCNC: 3.9 G/DL (ref 3.5–5)
ALP SERPL-CCNC: 70 U/L (ref 45–120)
ALT SERPL W P-5'-P-CCNC: 29 U/L (ref 0–45)
ANION GAP SERPL CALCULATED.3IONS-SCNC: 11 MMOL/L (ref 5–18)
AST SERPL W P-5'-P-CCNC: 31 U/L (ref 0–40)
BASOPHILS # BLD AUTO: 0 THOU/UL (ref 0–0.2)
BASOPHILS NFR BLD AUTO: 1 % (ref 0–2)
BILIRUB SERPL-MCNC: 0.7 MG/DL (ref 0–1)
BUN SERPL-MCNC: 23 MG/DL (ref 8–28)
CALCIUM SERPL-MCNC: 10.2 MG/DL (ref 8.5–10.5)
CHLORIDE BLD-SCNC: 110 MMOL/L (ref 98–107)
CO2 SERPL-SCNC: 21 MMOL/L (ref 22–31)
CREAT SERPL-MCNC: 0.64 MG/DL (ref 0.7–1.3)
EOSINOPHIL # BLD AUTO: 0.4 THOU/UL (ref 0–0.4)
EOSINOPHIL NFR BLD AUTO: 6 % (ref 0–6)
ERYTHROCYTE [DISTWIDTH] IN BLOOD BY AUTOMATED COUNT: 15.9 % (ref 11–14.5)
FERRITIN SERPL-MCNC: 1627 NG/ML (ref 27–300)
GFR SERPL CREATININE-BSD FRML MDRD: >60 ML/MIN/1.73M2
GLUCOSE BLD-MCNC: 97 MG/DL (ref 70–125)
HCT VFR BLD AUTO: 42.7 % (ref 40–54)
HGB BLD-MCNC: 13.9 G/DL (ref 14–18)
IMM GRANULOCYTES # BLD: 0 THOU/UL
IMM GRANULOCYTES NFR BLD: 0 %
INR PPP: 1.88 (ref 0.9–1.1)
IRON SATN MFR SERPL: 19 % (ref 20–50)
IRON SERPL-MCNC: 57 UG/DL (ref 42–175)
LYMPHOCYTES # BLD AUTO: 1.5 THOU/UL (ref 0.8–4.4)
LYMPHOCYTES NFR BLD AUTO: 26 % (ref 20–40)
MCH RBC QN AUTO: 30.2 PG (ref 27–34)
MCHC RBC AUTO-ENTMCNC: 32.6 G/DL (ref 32–36)
MCV RBC AUTO: 93 FL (ref 80–100)
MONOCYTES # BLD AUTO: 0.6 THOU/UL (ref 0–0.9)
MONOCYTES NFR BLD AUTO: 11 % (ref 2–10)
NEUTROPHILS # BLD AUTO: 3.3 THOU/UL (ref 2–7.7)
NEUTROPHILS NFR BLD AUTO: 56 % (ref 50–70)
PLATELET # BLD AUTO: 167 THOU/UL (ref 140–440)
PMV BLD AUTO: 10 FL (ref 8.5–12.5)
POTASSIUM BLD-SCNC: 4.3 MMOL/L (ref 3.5–5)
PROT SERPL-MCNC: 6.9 G/DL (ref 6–8)
PSA SERPL-MCNC: <0.1 NG/ML (ref 0–6.5)
RBC # BLD AUTO: 4.6 MILL/UL (ref 4.4–6.2)
SODIUM SERPL-SCNC: 142 MMOL/L (ref 136–145)
TIBC SERPL-MCNC: 298 UG/DL (ref 313–563)
TRANSFERRIN SERPL-MCNC: 238 MG/DL (ref 212–360)
WBC: 5.9 THOU/UL (ref 4–11)

## 2021-04-18 ENCOUNTER — RECORDS - HEALTHEAST (OUTPATIENT)
Dept: LAB | Facility: CLINIC | Age: 86
End: 2021-04-18

## 2021-04-20 LAB — INR PPP: 2.05 (ref 0.9–1.1)

## 2021-04-26 ENCOUNTER — RECORDS - HEALTHEAST (OUTPATIENT)
Dept: LAB | Facility: CLINIC | Age: 86
End: 2021-04-26

## 2021-04-27 LAB — INR PPP: 2.65 (ref 0.9–1.1)

## 2021-05-08 ENCOUNTER — RECORDS - HEALTHEAST (OUTPATIENT)
Dept: LAB | Facility: CLINIC | Age: 86
End: 2021-05-08

## 2021-05-11 LAB — INR PPP: 2.71 (ref 0.9–1.1)

## 2021-05-27 VITALS
TEMPERATURE: 98.7 F | HEART RATE: 68 BPM | SYSTOLIC BLOOD PRESSURE: 93 MMHG | RESPIRATION RATE: 18 BRPM | OXYGEN SATURATION: 98 % | DIASTOLIC BLOOD PRESSURE: 60 MMHG

## 2021-05-27 VITALS
RESPIRATION RATE: 16 BRPM | DIASTOLIC BLOOD PRESSURE: 81 MMHG | OXYGEN SATURATION: 95 % | SYSTOLIC BLOOD PRESSURE: 129 MMHG | TEMPERATURE: 97.1 F | HEART RATE: 71 BPM

## 2021-05-27 NOTE — PROGRESS NOTES
INR 2.0 Continue current management dosing of Warfarin. Continue  diet of moderate Vitamin K intake. Discussed with pt the need to call with questions or concerns or any change in medication especially herbal medication or OTC. Call with increased bleeding or bruising or any upcoming procedures.

## 2021-05-28 NOTE — TELEPHONE ENCOUNTER
Anticoagulation Transition of Care    Antonio White was referred to transition management to [facility].    Warfarin indication: Atrial Fibrillation   Current INR goal 2.0-3.0   Date of last cardiology office visit 12/20/18     INR goal range standard for indication(s). Updated INR standing orders and anticoagulation referral renewal placed. Please contact the anticoagulation clinic if you have questions on the parameters of the renewal.    Sharonda Dumas RN

## 2021-05-28 NOTE — PATIENT INSTRUCTIONS - [facility]
INR 2.3 After talking with pt and discussing history of greens/salads and medication change. Pt will  continue  with current diet and dosing of Warfarin.  Continue with moderation of Vit K and green leafy vegetables. Cautioned to call with increase bruising or bleeding. Reminded to call with medication change especially antibiotic. Call with any questions or concerns or any up coming procedures. Cautioned about using Herbal medication.

## 2021-05-29 ENCOUNTER — RECORDS - HEALTHEAST (OUTPATIENT)
Dept: ADMINISTRATIVE | Facility: CLINIC | Age: 86
End: 2021-05-29

## 2021-05-29 NOTE — TELEPHONE ENCOUNTER
FYI - Status Update  Who is Calling: Child of patient  Update: Patient's child would like a call back to discuss different options on how the patient receives their dosing information . Patient's child is worried that the patient might have some confusion if the information is not given right after having their INR done. Please call the patient's child back to discuss this concern further, thank you.  Okay to leave a detailed message?:  Yes

## 2021-05-29 NOTE — TELEPHONE ENCOUNTER
ACN called and spoke with Antonio and Introduced him to the [facility] Program.    All questions answered and he is aware that ACN will be calling him on 5/28 to discuss INR and warfarin dosing instructions.Patient stated to call his mobile phone due to landline is not reliable.    Instructed to call [facility] at  for any other questions or concerns.    Patient verbalized understanding and agrees to plan.    Sharonda Dumas RN

## 2021-05-29 NOTE — TELEPHONE ENCOUNTER
Who is calling:  Patient Antonio  Reason for Call:  Patient would like a call back today to verify/confirm that what he is doing/dosing is correct, as Antonio informed he did not really get any instruction for this last month, so he has just been taking the same dosing.  Date of last appointment with primary care: 04-23-19 in [facility]; patient does not have primary care through [facility].  Okay to leave a detailed message: Yes, call mobile phone first. Thanks.

## 2021-05-29 NOTE — TELEPHONE ENCOUNTER
ACN called and spoke with patient's daughter Barbara and she voiced concerns that this new routine might be hard for the patient due to memory issues.    She stated that the patient is used to having the doses written down and appointment scheduled prior to going home. Then he will give the information to his wife.    Discussed that ACN will add note on INR  appointment to call [facility] clinic for dosing prior to discharging patient every time he has his INR's done.    She is also requesting to be called.    She has no other concerns at this time.    Sharonda Dumas RN

## 2021-05-29 NOTE — TELEPHONE ENCOUNTER
ANTICOAGULATION  MANAGEMENT    Assessment     Today's INR result of 2.5 is Therapeutic (goal INR of 2.0-3.0)        Warfarin taken as previously instructed    No new diet changes affecting INR    No new medication/supplements affecting INR    Continues to tolerate warfarin with no reported s/s of bleeding or thromboembolism     Previous INR was Therapeutic    Plan:     Spoke with Antonio regarding INR result and instructed:     Warfarin Dosing Instructions:  Continue current warfarin dose    5 mg every Sun, Thu; 2.5 mg all other days       (0 % change)    Instructed patient to follow up no later than: 4-6 weeks - prefers to call to make appointment.    Education provided: importance of therapeutic range, target INR goal and significance of current INR result, importance of following up for INR monitoring at instructed interval, importance of taking warfarin as instructed and importance of notifying clinic for changes in medications    Antonio verbalizes understanding and agrees to warfarin dosing plan.    Instructed to call the [facility] for any changes, questions or concerns. (#[phone])   ?   Sharonda Dumas RN    Subjective/Objective:      Antonio White, a 84 y.o. male is on warfarin.     Antonio reports:     Home warfarin dose: verbally confirmed home dose with Antonio and updated on anticoagulation calendar - doses written on template is incorrect.     Missed doses: No     Medication changes:  Yes: took one time dose of amoxicillin prior to dental appointment today- new crown placed.     S/S of bleeding or thromboembolism:  No     New Injury or illness:  No     Changes in diet or alcohol consumption:  No     Upcoming surgery, procedure or cardioversion:  No    Anticoagulation Episode Summary     Current INR goal:   2.0-3.0   TTR:   62.2 % (4.4 y)   Next INR check:   7/9/2019   INR from last check:   2.50 (5/28/2019)   Weekly max warfarin dose:      Target end date:      INR check location:      Preferred lab:       Send INR reminders to:   [facility]    Indications    Atrial fibrillation (H) [I48.91]           Comments:   chronic           Anticoagulation Care Providers     Provider Role Specialty Phone number    Joey Garcia MD Referring Cardiology [phone]

## 2021-05-30 NOTE — TELEPHONE ENCOUNTER
ANTICOAGULATION  MANAGEMENT    Assessment     Today's INR result of 2.5 is Therapeutic (goal INR of 2.0-3.0)        Warfarin taken as previously instructed    No new diet changes affecting INR    No new medication/supplements affecting INR    Continues to tolerate warfarin with no reported s/s of bleeding or thromboembolism     Previous INR was Therapeutic     Spoke with patient while he was still at the lab but he was not sure of his warfarin doses. Updated patient that ACN will call his wife to verify doses    Plan:     Spoke with Marisabel regarding INR result and instructed:     Warfarin Dosing Instructions:  Continue current warfarin dose    5 mg every Sun, Thu; 2.5 mg all other days      (0 % change)    Instructed patient to follow up no later than: 4-6 weeks- Marisabel prefers to have the patient's INR checked every 4 weeks - appointment made.    Education provided: importance of therapeutic range, target INR goal and significance of current INR result and importance of notifying clinic for changes in medications    Marisabel verbalizes understanding and agrees to warfarin dosing plan.  Marisabel stated that she will relay message to their daughter Barbara.    Instructed to call the [facility] for any changes, questions or concerns. (#[phone])   ?   Sharonda Dumas RN    Subjective/Objective:      Antonio White, a 84 y.o. male is on warfarin.     Antonio reports:     Home warfarin dose: verbally confirmed home dose with Marisabel and updated on anticoagulation calendar     Missed doses: No     Medication changes:  No     S/S of bleeding or thromboembolism:  No     New Injury or illness:  No     Changes in diet or alcohol consumption:  No     Upcoming surgery, procedure or cardioversion:  No    Anticoagulation Episode Summary     Current INR goal:   2.0-3.0   TTR:   62.9 % (4.5 y)   Next INR check:   8/6/2019   INR from last check:   2.50 (6/25/2019)   Weekly max warfarin dose:      Target end date:      INR check location:       Preferred lab:      Send INR reminders to:   [facility]    Indications    Atrial fibrillation (H) [I48.91]           Comments:   chronic           Anticoagulation Care Providers     Provider Role Specialty Phone number    Joey Garcia MD Referring Cardiology [phone]

## 2021-05-30 NOTE — TELEPHONE ENCOUNTER
ACN called and spoke with patient's pharmacy and updated that ACN sent 3 month supply of warfarin based on patient's current warfarin doses this morning.    Sharonda Dumas RN

## 2021-05-30 NOTE — TELEPHONE ENCOUNTER
Lab Results   Component Value Date    INR 2.50 (!) 06/25/2019    INR 2.50 (!) 05/28/2019    INR 2.3 04/23/2019       Patient's current Warfarin doses:    5 mg every Sun, Thu; 2.5 mg all other days       Next INR check is on 8/6/19      Patient's last OV with HCC was on 12/20/18    Warfarin prescription 3 month supply and one refill sent to patient's pharmacy today.    Sharonda Dumas RN

## 2021-05-30 NOTE — TELEPHONE ENCOUNTER
ANTICOAGULATION  MANAGEMENT    Assessment     Today's INR result of 2.9 is Therapeutic (goal INR of 2.0-3.0)        Warfarin taken as previously instructed    No new diet changes affecting INR    No new medication/supplements affecting INR    Continues to tolerate warfarin with no reported s/s of bleeding or thromboembolism     Previous INR was Therapeutic    Plan:     Spoke with Marisabel regarding INR result and instructed:     Warfarin Dosing Instructions:  Continue current warfarin dose    5 mg every Sun, Thu; 2.5 mg all other days      (0 % change)    Instructed patient to follow up no later than: 4-6 weeks - prefers to have INR done every 4 weeks appointment made    Education provided: importance of consistent vitamin K intake, importance of therapeutic range and target INR goal and significance of current INR result    Marisabel verbalizes understanding and agrees to warfarin dosing plan.    Instructed to call the [facility] for any changes, questions or concerns. (#[phone])   ?   Per [facility] Protocol/ Sharonda Dumas RN ,ACN / Dr. Joey Garcia      Subjective/Objective:      Antonio White, a 84 y.o. male is on warfarin.     Antonio reports:     Home warfarin dose: verbally confirmed home dose with Marisabel and updated on anticoagulation calendar     Missed doses: No     Medication changes:  No     S/S of bleeding or thromboembolism:  No     New Injury or illness:  No     Changes in diet or alcohol consumption:  No     Upcoming surgery, procedure or cardioversion:  No    Anticoagulation Episode Summary     Current INR goal:   2.0-3.0   TTR:   63.5 % (4.5 y)   Next INR check:   9/3/2019   INR from last check:   2.90 (7/23/2019)   Weekly max warfarin dose:      Target end date:      INR check location:      Preferred lab:      Send INR reminders to:   [facility]    Indications    Atrial fibrillation (H) [I48.91]           Comments:   chronic           Anticoagulation Care Providers     Provider  Role Specialty Phone number    Joey Garcia MD Referring Cardiology [phone]

## 2021-05-31 VITALS — WEIGHT: 144 LBS | HEIGHT: 68 IN | BODY MASS INDEX: 21.82 KG/M2

## 2021-05-31 NOTE — TELEPHONE ENCOUNTER
ANTICOAGULATION  MANAGEMENT    Assessment     Today's INR result of 3.6 is Supratherapeutic (goal INR of 2.0-3.0)        Warfarin taken as previously instructed    No new diet changes affecting INR    No new medication/supplements affecting INR    Continues to tolerate warfarin with no reported s/s of bleeding or thromboembolism     Previous INR was Supratherapeutic    Plan:     Spoke with patient's spouse Marisabel regarding INR result and instructed:     Warfarin Dosing Instructions:  take one time lower dose of 1.25 mg today then change warfarin dose to    5 mg every Sun; 2.5 mg all other days     (11 % change)    Instructed patient to follow up no later than: 1-2 weeks, appointment made.    Education provided: importance of therapeutic range, target INR goal and significance of current INR result and importance of taking warfarin as instructed    Marisabel verbalizes understanding and agrees to warfarin dosing plan.    Instructed to call the [facility] for any changes, questions or concerns. (#[phone])   ?   Sharonda Dumas RN    Subjective/Objective:      Antonio White, a 84 y.o. male is on warfarin.     Antonio reports:     Home warfarin dose: verbally confirmed home dose with Marisabel and updated on anticoagulation calendar     Missed doses: No     Medication changes:  No     S/S of bleeding or thromboembolism:  No     New Injury or illness:  No     Changes in diet or alcohol consumption:  No     Upcoming surgery, procedure or cardioversion:  No    Anticoagulation Episode Summary     Current INR goal:   2.0-3.0   TTR:   65.7 %   Next INR check:   9/17/2019   INR from last check:   3.60! (9/3/2019)   Weekly max warfarin dose:      Target end date:      INR check location:      Preferred lab:      Send INR reminders to:   [facility]    Indications    Atrial fibrillation (H) [I48.91]           Comments:   chronic           Anticoagulation Care Providers     Provider Role Specialty Phone number     Joey Garcia MD Referring Cardiology [phone]

## 2021-05-31 NOTE — TELEPHONE ENCOUNTER
ANTICOAGULATION  MANAGEMENT    Assessment     Today's INR result of 3.4 is Supratherapeutic (goal INR of 2.0-3.0)        Warfarin taken as previously instructed    No new diet changes affecting INR    No new medication/supplements affecting INR    Continues to tolerate warfarin with no reported s/s of bleeding or thromboembolism     Previous INR was Therapeutic    Plan:     Spoke with Antonio regarding INR result and instructed:     Warfarin Dosing Instructions:  take one time lower dose of 1.25 mg today then continue current warfarin dose    5 mg every Sun, Thu; 2.5 mg all other days     (0 % change)    Instructed patient to follow up no later than: 2 weeks, patient prefers to call to make appointment.    Education provided: importance of therapeutic range, target INR goal and significance of current INR result and importance of taking warfarin as instructed    Antonio verbalizes understanding and agrees to warfarin dosing plan.    [facility], questions or concerns. (#[phone])   ?   [facility]/ Sharonda Dumas RN ,ACN / Dr. Joey Garcia        Subjective/Objective:      Antonio White, a 84 y.o. male is on warfarin.     Antonio reports:     Home warfarin dose: template incorrect; verbally confirmed home dose with Antonio and updated on anticoagulation calendar     Missed doses: No     Medication changes:  No     S/S of bleeding or thromboembolism:  No     New Injury or illness:  No     Changes in diet or alcohol consumption:  No     Upcoming surgery, procedure or cardioversion:  No    Anticoagulation Episode Summary     Current INR goal:   2.0-3.0   TTR:   62.8 % (4.6 y)   Next INR check:   9/3/2019   INR from last check:   3.40! (8/20/2019)   Weekly max warfarin dose:      Target end date:      INR check location:      Preferred lab:      Send INR reminders to:   [facility]    Indications    Atrial fibrillation (H) [I48.91]           Comments:   chronic            Anticoagulation Care Providers     Provider Role Specialty Phone number    Joey Garcia MD Referring Cardiology [phone]

## 2021-05-31 NOTE — TELEPHONE ENCOUNTER
Who is calling:  Spouse, Marisabel  Reason for Call:  The patient's spouse is returning a call to his ACN regardign his INR today.   Okay to leave a detailed message: Yes

## 2021-05-31 NOTE — PROGRESS NOTES
[facility] Office Note    Assessment / Plan:    1.  Atrial fibrillation with adequate rate control on present medical regimen.  Continue warfarin  2.  Coronary artery disease- no anginal symptoms  3.  Valvular heart disease .  Progressive mitral insufficiency, with worsening left ventricular systolic function.  He does appear to be relatively asymptomatic , and is not interested in pursuing any further intervention at this time.  We will plan continued medical management.  He also expresses a desire for no resuscitation in the event of cardiopulmonary arrest.  He states that he is discussed this with his family members.  4.  Ascending aortic aneurysm.  Stable on most recent CT chest 1 year ago.      Plan follow-up 6 months     ______________________________________________________________________    Subjective:    I had the opportunity to see Antonio White at the [facility]. Antonio White is a 84 y.o. male with a known history of coronary artery disease status post percutaneous intervention to the circumflex in May 2013.  He also has a history of atrial fibrillation, maintained on warfarin with some difficulty controlling his INRs recently.  He has a bicuspid aortic valve with aortic root enlargement and aortic insufficiency.  Moderate to severe mitral insufficiency has been noted, worsened on recent echocardiographic assessment.  When I saw him in January, we discussed valve clinic evaluation for a MitraClip consideration.  He returns today for routine follow-up.    He states that after reviewing the video online, he decided not to pursue MitraClip.  He reports that after discussing with his wife he would not wish to pursue invasive procedures.  He expresses that in the event of cardiopulmonary arrest he would not wish to be resuscitated.    He notes that over the last 6 months he has had some increase in edema.  He reports trying to limit his sodium intake but his wife tells him he does  a poor job of it.  He has not noticed any palpitations.  He is occasionally dizzy when he walks around the block which he does at a very slow pace.  He has not had any falls.  He denies orthopnea or paroxysmal nocturnal dyspnea.  He does use a treadmill occasion, gripping the side rails.  He wonders how intensely he needs to use the treadmill.    ______________________________________________________________________    Problem List:  Patient Active Problem List   Diagnosis     Adenocarcinoma Of The Prostate Gland     Benign Essential Hypertension     Aortic Regurgitation     Atrial fibrillation (H)     Mitral insufficiency     Closed left hip fracture (H)     Kidney disease, chronic, stage III (GFR 30-59 ml/min) (H)     Coronary artery disease     Dyspepsia     Personal history of thromboembolic disease     Anemia     Osteoporosis     Contusion of left hip, initial encounter     Medical History:  Past Medical History:   Diagnosis Date     Anemia      Aortic regurgitation      Atrial fibrillation (H)      Cardiac murmur      Chronic kidney disease     stage 3     Coronary artery disease      GERD (gastroesophageal reflux disease)      Hearing loss      Hyperlipidemia      Hypertension      Malignant melanoma (H)     left chest     MI (myocardial infarction) (H) 2013    x1     Mitral valve prolapse      Osteoporosis      Personal history of thromboembolic disease 3/6/2016    History thromboembolic MI in 2013; continue indefinite anticoagulation unless risks outweight benefits.     Prostate cancer (H)     had radioactive seed treatment     Sleep apnea     no CPAP     Surgical History:  Past Surgical History:   Procedure Laterality Date     CYSTOSCOPY W/ URETERAL STENT PLACEMENT Right 10/31/2014    Procedure: CYSTOSCOPY, RIGHT URETEROSCOPY AND STENT INSERTION;  Surgeon: Chico Nur MD;  Location: [facility];  Service:      HERNIA REPAIR      left inguinal     JOINT REPLACEMENT Left     CHRIS     melanoma  surgery      Removal of melanoma on chest.  Cleared by surgeon 1 year ago.     PARTIAL HIP ARTHROPLASTY Left 3/4/2016    Procedure: LEFT HIP FRACTURE BIPOLAR ;  Surgeon: Nichole Menon MD;  Location: [facility];  Service:      CT ANESTH,REPAIR UPPER ABD HERNIA NOS Bilateral     ingiunal      CT CYSTO/URETERO W/LITHOTRIPSY &INDWELL STENT INSRT Bilateral 2/12/2018    Procedure: CYSTOSCOPY, BILATERAL URETEROSCOPY, LASER LITHOTRIPSY STONE EXTRACTION, URETERAL STENT PLACEMENT;  Surgeon: Lincoln Srinivasan MD;  Location: [facility] OR;  Service: Urology     CT CYSTO/URETERO W/LITHOTRIPSY &INDWELL STENT INSRT Bilateral 3/5/2018    Procedure: CYSTOSCOPY, BILATERAL URETEROSCOPY, LASER LITHOTRIPSY, BASKET EXTRACTION, BILATERAL URETERAL STENT EXCHANGE;  Surgeon: Lincoln Srinivasan MD;  Location: [facility];  Service: Urology     CT CYSTOURETHROSCOPY,URETER CATHETER Bilateral 2/12/2018    Procedure: CYSTOSCOPY, WITH RETROGRADE PYELOGRAM;  Surgeon: Lincoln Srinivasan MD;  Location: [facility];  Service: Urology     TONSILECTOMY, ADENOIDECTOMY, BILATERAL MYRINGOTOMY AND TUBES       TONSILLECTOMY      at age 7     Social History:  Social History     Socioeconomic History     Marital status:      Spouse name: Not on file     Number of children: Not on file     Years of education: Not on file     Highest education level: Not on file   Occupational History     Not on file   Social Needs     Financial resource strain: Not on file     Food insecurity:     Worry: Not on file     Inability: Not on file     Transportation needs:     Medical: Not on file     Non-medical: Not on file   Tobacco Use     Smoking status: Never Smoker     Smokeless tobacco: Never Used   Substance and Sexual Activity     Alcohol use: Yes     Alcohol/week: 2.4 oz     Types: 4 Glasses of wine per week     Comment: 3-4 glasses wine weekly     Drug use: No     Sexual activity: Not on file   Lifestyle     Physical activity:     Days  per week: Not on file     Minutes per session: Not on file     Stress: Not on file   Relationships     Social connections:     Talks on phone: Not on file     Gets together: Not on file     Attends Yarsani service: Not on file     Active member of club or organization: Not on file     Attends meetings of clubs or organizations: Not on file     Relationship status: Not on file     Intimate partner violence:     Fear of current or ex partner: Not on file     Emotionally abused: Not on file     Physically abused: Not on file     Forced sexual activity: Not on file   Other Topics Concern     Not on file   Social History Narrative     Not on file         Review of Systems:   General: WNL  Eyes: WNL  Ears/Nose/Throat: WNL  Lungs: WNL  Heart: WNL  Stomach: WNL  Bladder: WNL  Muscle/Joints: WNL  Skin: WNL  Nervous System: WNL  Mental Health: WNL     Blood: WNL          Family History:  Family History   Problem Relation Age of Onset     Heart disease Mother         hypertension     Prostate cancer Father      Cancer Father         mouth and throat     Cancer Sister         kidney     Cancer Daughter         skin     Cancer Daughter         skin         Allergies:  Allergies   Allergen Reactions     Levaquin [Levofloxacin] Itching and Rash     Pt developed itching and redness of forearm with iv administration, resolved with d/c of infusion.     Medications:  Current Outpatient Medications   Medication Sig Dispense Refill     amoxicillin (AMOXIL) 500 MG capsule Take 2,000 mg by mouth as needed. Take 1 hour before appointment  1     ASPIRIN (ASPIR-81 ORAL) Take 81 mg by mouth daily.        cholecalciferol, vitamin D3, 1,000 unit tablet Take 1 tablet (1,000 Units total) by mouth daily.  0     fenofibrate (TRIGLIDE) 160 MG tablet Take 160 mg by mouth daily.       FERROUS SULFATE (SLOW FE ORAL) Take 45 mg by mouth daily.       hydroCHLOROthiazide (MICROZIDE) 12.5 mg capsule Take 12.5 mg by mouth daily.       hydroCHLOROthiazide  "(MICROZIDE) 12.5 mg capsule TAKE ONE CAPSULE BY MOUTH DAILY 90 capsule 1     hydroCHLOROthiazide (MICROZIDE) 12.5 mg capsule TAKE ONE CAPSULE BY MOUTH DAILY 90 capsule 1     HYDROcodone-acetaminophen (NORCO) 5-325 mg per tablet Take 1 tablet by mouth every 4 (four) hours as needed for pain. 30 tablet 0     leuprolide, 6 month, (LUPRON DEPOT, 6 MONTH,) 45 mg SyKt Inject 45 mg into the shoulder, thigh, or buttocks every 6 (six) months.       losartan (COZAAR) 100 MG tablet Take 100 mg by mouth daily.       metoprolol succinate (TOPROL-XL) 25 MG TAKE 1 TABLET BY MOUTH DAILY. 90 tablet 0     multivitamin therapeutic (THERAGRAN) tablet Take 1 tablet by mouth daily.       pravastatin (PRAVACHOL) 80 MG tablet Take 1 tablet by mouth bedtime.  1     warfarin (COUMADIN/JANTOVEN) 2.5 MG tablet Take 1-2 tablets (2.5-5 mg total) by mouth daily. Adjust dose per INR results as instructed. 110 tablet 1     No current facility-administered medications for this visit.        Objective:   Wt Readings from Last 3 Encounters:   08/09/19 128 lb (58.1 kg)   12/20/18 143 lb (64.9 kg)   12/13/18 143 lb (64.9 kg)     Vital signs:  /58 (Patient Site: Left Arm, Patient Position: Sitting, Cuff Size: Adult Regular)   Pulse 81   Resp 16   Ht 5' 6\" (1.676 m)   Wt 128 lb (58.1 kg)   BMI 20.66 kg/m        Physical Exam:    GENERAL APPEARANCE: Alert, cooperative and in no acute distress.  Slow, deliberate speech.  Frail appearing, walks with a wide-based gait.  HEENT: Conjunctivae not injected.  No scleral icterus. No Xanthelasma. Oral mucous membranes pink and moist.  NECK: No JVD.  No Hepatojugular reflux. Thyroid not enlarged.  CHEST: clear to auscultation.  Moderate kyphosis  CARDIOVASCULAR: Irregularly irregular S1 and S2.   Apical impulse is laterally displaced.   No diastolic murmur audible.  3 out of 6  blowing holosystolic murmur audible at the cardiac apex radiating to the axilla.  Brachial, radial and posterior tibial pulses are " intact and symetric. No carotid bruits noted.  ABDOMEN: Nontender. BS+. No bruits.  EXTREMITIES: Trace edema.  Prominent superficial varicosities bilaterally.        Lab Results:  LIPIDS:  Lab Results   Component Value Date    CHOL 134 02/14/2019    CHOL 136 04/12/2018    CHOL 131 11/07/2017     Lab Results   Component Value Date    HDL 52 02/14/2019    HDL 46 04/12/2018    HDL 47 11/07/2017     Lab Results   Component Value Date    LDLCALC 71 02/14/2019    LDLCALC 76 04/12/2018    LDLCALC 74 11/07/2017     Lab Results   Component Value Date    TRIG 56 02/14/2019    TRIG 71 04/12/2018    TRIG 51 11/07/2017       Echocardiogram 11/2016:  Summary  Mildly dilated left ventricle.  Global hypokinesis of the left ventricle with mild impairment of systolic function.  Not all wall segments were well visualized.  Left ventricular ejection fraction is visually estimated to be 45%.  Moderate biatrial enlargement.  Patent foramen ovale with left-to-right shunt was visualized.  Interatrial septum bows to the right, suggesting increased left atrial pressures.  Moderate dilation of the aortic root.  Mild aortic regurgitation.  Moderate mitral regurgitation.  This study was compared with a previously done echocardiogram 1/28/16. The results are similar.    Echocardiogram 12/2018:      1.Left ventricle ejection fraction is moderately decreased. The calculated left ventricular ejection fraction is 41%.    2.TAPSE is abnormal, which is consistent with abnormal right ventricular systolic function.    3.Severe enlargement left atrium and moderate enlargement right atrium.    4.The sinuses of Valsalva is mildly dilated. The aortic root is mildly dilated. The ascending aorta is mildly dilated.    5.Patent foramen ovale present with left to right shunting indicated by color flow Doppler.    6.Moderate to severe mitral regurgitation. There is mild prolapse of the anterior mitral leaflet. There is mild prolapse of the posterior mitral  leaflet.    7.When compared to the previous study dated 11/15/2016, left ventricular ejection fraction might be slightly lower, pulmonary hypertension is present, aortic and mitral insufficiency does appear worse.        TAYLA SCOTT MD [facility]  [phone]

## 2021-06-01 ENCOUNTER — RECORDS - HEALTHEAST (OUTPATIENT)
Dept: ADMINISTRATIVE | Facility: CLINIC | Age: 86
End: 2021-06-01

## 2021-06-01 VITALS — HEIGHT: 66 IN | BODY MASS INDEX: 22.98 KG/M2 | WEIGHT: 143 LBS

## 2021-06-01 VITALS — BODY MASS INDEX: 23.08 KG/M2 | WEIGHT: 143 LBS

## 2021-06-01 VITALS — HEIGHT: 66 IN | WEIGHT: 143 LBS | BODY MASS INDEX: 22.98 KG/M2

## 2021-06-01 NOTE — TELEPHONE ENCOUNTER
ANTICOAGULATION  MANAGEMENT    Assessment     Today's INR result of 2.80 is Therapeutic (goal INR of 2.0-3.0)        Warfarin taken differently than instructed, but no impact to total weekly dose    No new diet changes affecting INR    No interaction expected between Probiotic and warfarin    Continues to tolerate warfarin with no reported s/s of bleeding or thromboembolism     Previous INR was Supratherapeutic at 3.60 on 9/3/19.    Plan:     Left a detailed message for Antonio regarding INR result and instructed:   1.  Requested a call back, due to warfarin dose written on template was taking more than instructed.  Need to verify from patient.     Warfarin Dosing Instructions:  (has 2.5mg tabs)   - Continue current warfarin dose 5 mg daily on Sunday; and 2.5 mg daily rest of week.    Instructed patient to follow up no later than:  2 wks.   - scheduled on 9/26/19 @ [facility]    Education provided: target INR goal and significance of current INR result    Instructed to call the [facility] for any changes, questions or concerns. (#[phone])   ?   Victorina Henson RN    Subjective/Objective:      Antonio White, a 84 y.o. male is on warfarin.     Antonio reports:     Home warfarin dose: template incorrect; verbally confirmed home dose with Antonio and updated on anticoagulation calendar - did not have his paper with dosing instructions.     Missed doses: No     Medication changes:  No.  However, template had different warfarin dose, and taking more than instructed with 22.5mg/wk, instead of 20mg/wk.     S/S of bleeding or thromboembolism:  No     New Injury or illness:  No     Changes in diet or alcohol consumption:  Yes:  Reported currently taking probiotics.     Upcoming surgery, procedure or cardioversion:  No    Anticoagulation Episode Summary     Current INR goal:   2.0-3.0   TTR:   63.8 %   Next INR check:   9/25/2019   INR from last check:   2.80 (9/11/2019)   Weekly max warfarin dose:      Target end date:       INR check location:      Preferred lab:      Send INR reminders to:   [facility]    Indications    Atrial fibrillation (H) [I48.91]           Comments:   chronic           Anticoagulation Care Providers     Provider Role Specialty Phone number    Joey Garcia MD Referring Cardiology [phone]

## 2021-06-01 NOTE — TELEPHONE ENCOUNTER
ANTICOAGULATION  MANAGEMENT    Assessment     Yesterday's INR result of 2.5 is Therapeutic (goal INR of 2.0-3.0)        Warfarin taken as previously instructed    No new diet changes affecting INR    No new medication/supplements affecting INR    Continues to tolerate warfarin with no reported s/s of bleeding or thromboembolism     Previous INR was Therapeutic    Plan:     Spoke with Marisabel regarding INR result and instructed:     Warfarin Dosing Instructions:  Continue current warfarin dose 5 mg daily on Sun; and 2.5 mg daily  (0 % change)    Instructed patient to follow up no later than: 3-4 weeks    Education provided: importance of therapeutic range    Marisabel verbalizes understanding and agrees to warfarin dosing plan.    Instructed to call the [facility] for any changes, questions or concerns. (#[phone])   ?   Eli May RN    Subjective/Objective:      Antonio White, a 84 y.o. male is on warfarin.     Antonio reports:     Home warfarin dose: template incorrect; verbally confirmed home dose with Marisabel and updated on anticoagulation calendar     Missed doses: No     Medication changes:  No     S/S of bleeding or thromboembolism:  No     New Injury or illness:  No     Changes in diet or alcohol consumption:  No     Upcoming surgery, procedure or cardioversion:  No    Anticoagulation Episode Summary     Current INR goal:   2.0-3.0   TTR:   63.9 %   Next INR check:   10/24/2019   INR from last check:   2.50 (9/26/2019)   Weekly max warfarin dose:      Target end date:      INR check location:      Preferred lab:      Send INR reminders to:   [facility]    Indications    Atrial fibrillation (H) [I48.91]           Comments:   chronic           Anticoagulation Care Providers     Provider Role Specialty Phone number    Joey Garcia MD Referring Cardiology [phone]

## 2021-06-01 NOTE — TELEPHONE ENCOUNTER
Anticoagulation Management    Unable to reach Antonio today.    Today's INR result of 2.5 is Therapeutic (goal INR of 2.0-3.0)     Follow up required to confirm home dose     Left message for patient to continue same dose of warfarin tonight         ACN to follow up    Eli May RN

## 2021-06-01 NOTE — TELEPHONE ENCOUNTER
Anticoagulation Management    Unable to reach Antonio today.    Today's INR result of 2.8 is Therapeutic (goal INR of 2.0-3.0)     Follow up required to confirm home dose and discuss diet change: probiotic shake     Left message to continue current dose of warfarin 2.5 mg tonight         ACN to follow up    Sharonda Dumas RN

## 2021-06-01 NOTE — TELEPHONE ENCOUNTER
----- Message from Dagoberto Clayton sent at 9/27/2019 11:43 AM CDT -----  Regarding: med refills  General phone call:    Caller: Pt  Primary cardiologist: Dr Garica    Detailed reason for call: Pt called in with medication refill request - but he wasn't sure what meds he needed - could you please call the patient the verify - thank you     New or active symptoms? na  Best phone number: [phone]  Best time to contact: any  Ok to leave a detailed message? yes  Device? no    Additional Info:   [id number] - [place], [place] - [address] AT [facility] [address]

## 2021-06-02 VITALS — HEIGHT: 66 IN | WEIGHT: 143 LBS | BODY MASS INDEX: 22.98 KG/M2

## 2021-06-02 NOTE — TELEPHONE ENCOUNTER
ANTICOAGULATION  MANAGEMENT    Assessment     Today's INR result of 2.5 is Therapeutic (goal INR of 2.0-3.0)    10/30 ED visit due to fall with head injury      Warfarin taken as previously instructed    No new diet changes affecting INR    No new medication/supplements affecting INR    Continues to tolerate warfarin with no reported s/s of bleeding or thromboembolism     Previous INR was Therapeutic    Plan:     Spoke with patient's spouse Marisabel regarding INR result and instructed:     Warfarin Dosing Instructions:  Continue current warfarin dose    5 mg every Sun; 2.5 mg all other days     (0 % change)    Instructed patient to follow up no later than: 4 weeks - appointment made    Education provided: importance of therapeutic range    Marisabel verbalizes understanding and agrees to warfarin dosing plan.    Instructed to call the [facility] for any changes, questions or concerns. (#[phone])   ?   Sharonda Dumas RN    Subjective/Objective:      Antonio White, a 84 y.o. male is on warfarin.     Antonio reports:     Home warfarin dose: verbally confirmed home dose with Marisabel and updated on anticoagulation calendar     Missed doses: No     Medication changes:  No     S/S of bleeding or thromboembolism:  No     New Injury or illness:  Yes: ED visit due to fall with head injury- Marisabel stated that now she understood the importance of going to the ED for evaluation after a fall with head injury becca while on blood thinner.     Changes in diet or alcohol consumption:  No     Upcoming surgery, procedure or cardioversion:  No    Anticoagulation Episode Summary     Current INR goal:   2.0-3.0   TTR:   70.8 %   Next INR check:   11/29/2019   INR from last check:   2.50 (11/1/2019)   Weekly max warfarin dose:      Target end date:      INR check location:      Preferred lab:      Send INR reminders to:   [facility]    Indications    Atrial fibrillation (H) [I48.91]           Comments:   chronic            Anticoagulation Care Providers     Provider Role Specialty Phone number    Joey Garcia MD Referring Cardiology [phone]

## 2021-06-02 NOTE — TELEPHONE ENCOUNTER
ANTICOAGULATION  MANAGEMENT    Assessment     Today's INR result of 2.0 is Therapeutic (goal INR of 2.0-3.0)        Warfarin taken as previously instructed    No new diet changes affecting INR    No new medication/supplements affecting INR    Continues to tolerate warfarin with no reported s/s of bleeding or thromboembolism     Previous INR was Therapeutic    Plan:     Spoke with Marisabel regarding INR result and instructed:     Warfarin Dosing Instructions:  Continue current warfarin dose    5 mg every Sun; 2.5 mg all other days       (0 % change)    Instructed patient to follow up no later than: 2 weeks appointment made  Made aware that dose adjustment will be done if INR is low on next check.    Education provided: impact of vitamin K foods on INR, importance of therapeutic range and target INR goal and significance of current INR result    Marisabel verbalizes understanding and agrees to warfarin dosing plan.    Instructed to call the [facility] for any changes, questions or concerns. (#[phone])   ?   Sharonda Dumas RN    Subjective/Objective:      Antonio White, a 84 y.o. male is on warfarin.     Antonio reports:     Home warfarin dose: verbally confirmed home dose with Marisabel- patient's spouse and updated on anticoagulation calendar     Missed doses: No     Medication changes:  No     S/S of bleeding or thromboembolism:  No     New Injury or illness:  No     Changes in diet or alcohol consumption:  No- per Marisabel they have been eating broccoli 3 days in row but this was about 1 and half weeks ago.     Upcoming surgery, procedure or cardioversion:  No    Anticoagulation Episode Summary     Current INR goal:   2.0-3.0   TTR:   67.3 %   Next INR check:   11/1/2019   INR from last check:   2.00 (10/18/2019)   Weekly max warfarin dose:      Target end date:      INR check location:      Preferred lab:      Send INR reminders to:   [facility]    Indications    Atrial fibrillation (H) [I48.91]            Comments:   chronic           Anticoagulation Care Providers     Provider Role Specialty Phone number    Joey Garcia MD Referring Cardiology [phone]

## 2021-06-03 VITALS — WEIGHT: 128 LBS | BODY MASS INDEX: 20.57 KG/M2 | HEIGHT: 66 IN

## 2021-06-03 NOTE — TELEPHONE ENCOUNTER
ANTICOAGULATION  MANAGEMENT    Assessment     Today's INR result of 2.8 is Therapeutic (goal INR of 2.0-3.0)        Warfarin taken as previously instructed    No new diet changes affecting INR    No new medication/supplements affecting INR    Continues to tolerate warfarin with no reported s/s of bleeding or thromboembolism     Previous INR was Therapeutic    Plan:     Spoke with Marisabel regarding INR result and instructed:     Warfarin Dosing Instructions:  Continue current warfarin dose 5 mg daily on Sun; and 2.5 mg daily rest of week      Instructed patient to follow up no later than: 1 month    Education provided: target INR goal and significance of current INR result    Marisabel verbalizes understanding and agrees to warfarin dosing plan.    Instructed to call the [facility] for any changes, questions or concerns. (#[phone])   ?   Hoda Retana RN    Subjective/Objective:      Antonio White, a 84 y.o. male is on warfarin.     Antonio reports:     Home warfarin dose: verbally confirmed home dose with Marisabel and updated on anticoagulation calendar     Missed doses: No     Medication changes:  No     S/S of bleeding or thromboembolism:  No     New Injury or illness:  No     Changes in diet or alcohol consumption:  No     Upcoming surgery, procedure or cardioversion:  No    Anticoagulation Episode Summary     Current INR goal:   2.0-3.0   TTR:   70.8 % (1 y)   Next INR check:   12/27/2019   INR from last check:   2.80 (11/29/2019)   Weekly max warfarin dose:      Target end date:      INR check location:      Preferred lab:      Send INR reminders to:   [facility]    Indications    Atrial fibrillation (H) [I48.91]           Comments:   chronic           Anticoagulation Care Providers     Provider Role Specialty Phone number    Joey Garcia MD Referring Cardiology [phone]

## 2021-06-04 VITALS
OXYGEN SATURATION: 96 % | SYSTOLIC BLOOD PRESSURE: 112 MMHG | HEART RATE: 67 BPM | WEIGHT: 122 LBS | BODY MASS INDEX: 19.69 KG/M2 | TEMPERATURE: 97.4 F | DIASTOLIC BLOOD PRESSURE: 66 MMHG | RESPIRATION RATE: 12 BRPM

## 2021-06-04 VITALS
RESPIRATION RATE: 18 BRPM | SYSTOLIC BLOOD PRESSURE: 103 MMHG | BODY MASS INDEX: 19.05 KG/M2 | WEIGHT: 118 LBS | TEMPERATURE: 98.7 F | OXYGEN SATURATION: 95 % | DIASTOLIC BLOOD PRESSURE: 57 MMHG | HEART RATE: 72 BPM

## 2021-06-04 VITALS
RESPIRATION RATE: 14 BRPM | HEIGHT: 66 IN | DIASTOLIC BLOOD PRESSURE: 66 MMHG | WEIGHT: 126 LBS | HEART RATE: 66 BPM | OXYGEN SATURATION: 96 % | BODY MASS INDEX: 20.25 KG/M2 | SYSTOLIC BLOOD PRESSURE: 128 MMHG

## 2021-06-04 VITALS
DIASTOLIC BLOOD PRESSURE: 72 MMHG | TEMPERATURE: 97.4 F | BODY MASS INDEX: 20.47 KG/M2 | SYSTOLIC BLOOD PRESSURE: 130 MMHG | RESPIRATION RATE: 16 BRPM | OXYGEN SATURATION: 95 % | WEIGHT: 126.8 LBS | HEART RATE: 66 BPM

## 2021-06-04 VITALS — HEIGHT: 66 IN | BODY MASS INDEX: 21.57 KG/M2 | WEIGHT: 134.2 LBS

## 2021-06-04 VITALS
OXYGEN SATURATION: 95 % | TEMPERATURE: 97.8 F | DIASTOLIC BLOOD PRESSURE: 54 MMHG | WEIGHT: 119 LBS | BODY MASS INDEX: 19.21 KG/M2 | HEART RATE: 61 BPM | SYSTOLIC BLOOD PRESSURE: 101 MMHG | RESPIRATION RATE: 18 BRPM

## 2021-06-04 VITALS
TEMPERATURE: 98 F | RESPIRATION RATE: 18 BRPM | WEIGHT: 127.4 LBS | DIASTOLIC BLOOD PRESSURE: 67 MMHG | BODY MASS INDEX: 20.56 KG/M2 | HEART RATE: 58 BPM | OXYGEN SATURATION: 96 % | SYSTOLIC BLOOD PRESSURE: 110 MMHG

## 2021-06-04 NOTE — TELEPHONE ENCOUNTER
Anticoagulation Annual Referral Renewal Review    Antonio White's chart reviewed for annual renewal of referral to anticoagulation monitoring.        Criteria for anticoagulation nurse and/or pharmacist renewal met   Warfarin indication: Atrial Fibrillation Yes, per indication   Current with INR monitoring/compliant Yes Yes   Date of last office visit 8/9/19 Yes, had office visit within last year   Time in Therapeutic Range (TTR) 70.8 % Yes, TTR > 60%       Antonio White met all criteria for anticoagulation management program initiated renewal.  New INR standing orders and anticoagulation referral renewal placed.      Sharonda Dumas RN  3:54 PM

## 2021-06-04 NOTE — TELEPHONE ENCOUNTER
ANTICOAGULATION  MANAGEMENT    Assessment     Today's INR result of 2.5 is Therapeutic (goal INR of 2.0-3.0)        Warfarin taken as previously instructed    No new diet changes affecting INR    No new medication/supplements affecting INR    Continues to tolerate warfarin with no reported s/s of bleeding or thromboembolism     Previous INR was Therapeutic    Plan:     Spoke with patient's spouse Marisabel regarding INR result and instructed:     Warfarin Dosing Instructions:  Continue current warfarin dose    5 mg every Sun; 2.5 mg all other days     (0 % change)    Instructed patient to follow up no later than: 4-6 weeks - appointment made.    Education provided: importance of therapeutic range, target INR goal and significance of current INR result and importance of taking warfarin as instructed    Marisabel verbalizes understanding and agrees to warfarin dosing plan.    Instructed to call the [facility] for any changes, questions or concerns. (#[phone])   ?   Sharonda Dumas RN    Subjective/Objective:      Antonio White, a 84 y.o. male is on warfarin.     Antonio reports:     Home warfarin dose: verbally confirmed home dose with patient's spouse Marisabel and updated on anticoagulation calendar     Missed doses: No     Medication changes:  No     S/S of bleeding or thromboembolism:  No     New Injury or illness:  No     Changes in diet or alcohol consumption:  No     Upcoming surgery, procedure or cardioversion:  No    Anticoagulation Episode Summary     Current INR goal:   2.0-3.0   TTR:   73.5 % (1 y)   Next INR check:   2/7/2020   INR from last check:   2.50 (12/27/2019)   Weekly max warfarin dose:      Target end date:      INR check location:      Preferred lab:      Send INR reminders to:   [facility]    Indications    Atrial fibrillation (H) [I48.91]           Comments:   chronic           Anticoagulation Care Providers     Provider Role Specialty Phone number    Joey Garcia MD  Referring Cardiology [phone]

## 2021-06-05 NOTE — TELEPHONE ENCOUNTER
ANTICOAGULATION  MANAGEMENT    Assessment     Today's INR result of 3.4 is Supratherapeutic (goal INR of 2.0-3.0)        Warfarin taken as previously instructed    No new diet changes affecting INR    No new medication/supplements affecting INR    Continues to tolerate warfarin with no reported s/s of bleeding or thromboembolism     Previous INR was Therapeutic x 3    Plan:     Spoke with patient's spouse Marisabel regarding INR result and instructed:     Warfarin Dosing Instructions:  one time lower dose of 1.25 mg today only then continue current warfarin dose    5 mg every Sun; 2.5 mg all other days      (0 % change)    Instructed patient to follow up no later than: added with OV with HCC on 2/24    Education provided: importance of consistent vitamin K intake, impact of vitamin K foods on INR, potential interaction between warfarin and alcohol, importance of therapeutic range and target INR goal and significance of current INR result    Marisabel verbalizes understanding and agrees to warfarin dosing plan.    Instructed to call the [facility] for any changes, questions or concerns. (#[phone])   ?   Sharonda Dumas RN    Subjective/Objective:      Antonio White, a 84 y.o. male is on warfarin.     Antonio reports:     Home warfarin dose: verbally confirmed home dose with patient's spouse Marisabel and updated on anticoagulation calendar     Missed doses: No     Medication changes:  No     S/S of bleeding or thromboembolism:  No     New Injury or illness:  No     Changes in diet or alcohol consumption:  No     Upcoming surgery, procedure or cardioversion:  No    Anticoagulation Episode Summary     Current INR goal:   2.0-3.0   TTR:   75.1 % (1 y)   Next INR check:   2/24/2020   INR from last check:   3.40! (2/7/2020)   Weekly max warfarin dose:      Target end date:      INR check location:      Preferred lab:      Send INR reminders to:   [facility]    Indications    Atrial fibrillation (H)  [I48.91]           Comments:   chronic           Anticoagulation Care Providers     Provider Role Specialty Phone number    Joey Garcia MD Referring Cardiology [phone]

## 2021-06-06 NOTE — TELEPHONE ENCOUNTER
ANTICOAGULATION  MANAGEMENT    Assessment     2/25 INR result of 2.9 is Therapeutic (goal INR of 2.0-3.0)     INR was done on 2/25 but result was not released/routed to [facility]      Warfarin taken as previously instructed    No new diet changes affecting INR    No new medication/supplements affecting INR    Continues to tolerate warfarin with no reported s/s of bleeding or thromboembolism     Previous INR was Supratherapeutic    Plan:     Spoke with patient's spouse Marisabel regarding INR result and instructed:     Warfarin Dosing Instructions:  Continue current warfarin dose    5 mg every Sun; 2.5 mg all other days     (0 % change)    Instructed patient to follow up no later than: 2-3 weeks from last INR= appointment made. Instructed Marisabel to call [facility] if they do not receive a call everytime an INR is done.    Education provided: importance of therapeutic range and target INR goal and significance of current INR result    Marisabel verbalizes understanding and agrees to warfarin dosing plan.    Instructed to call the [facility] for any changes, questions or concerns. (#[phone])   ?   Sharonda Dumas RN    Subjective/Objective:      Antonio White, a 84 y.o. male is on warfarin.     Antonio reports:     Home warfarin dose: verbally confirmed home dose with Marisabel and updated on anticoagulation calendar     Missed doses: No     Medication changes:  No     S/S of bleeding or thromboembolism:  No     New Injury or illness:  No     Changes in diet or alcohol consumption:  No     Upcoming surgery, procedure or cardioversion:  No    Anticoagulation Episode Summary     Current INR goal:   2.0-3.0   TTR:   75.0 % (11.3 mo)   Next INR check:   2/24/2020   INR from last check:   3.40! (2/7/2020)   Weekly max warfarin dose:      Target end date:      INR check location:      Preferred lab:      Send INR reminders to:   [facility]    Indications    Atrial fibrillation (H) [I48.91]           Comments:   chronic            Anticoagulation Care Providers     Provider Role Specialty Phone number    Joey Garcia MD Referring Cardiology [phone]

## 2021-06-06 NOTE — TELEPHONE ENCOUNTER
Lab Results   Component Value Date    INR 2.90 (!) 02/25/2020    INR 3.40 (!) 02/07/2020    INR 2.50 (!) 12/27/2019       Patient's current Warfarin doses:    5 mg every Sun; 2.5 mg all other days           Next INR check is on 2/24      Patient's last OV with HCC was on 8/9/2019    Warfarin prescription 3 month supply and one refill sent to patient's pharmacy today.    Sharonda Dumas RN

## 2021-06-06 NOTE — TELEPHONE ENCOUNTER
ANTICOAGULATION  MANAGEMENT PROGRAM    Antonio White is overdue for INR check.     [facility] called and spoke with patient's spouse Marisabel and she stated that the patient had an INR done on 2/25/2020 - No INR result under lab but noted that the anticoagulation template was scanned into the MR. Noted INR result of 2.9 written on template.      Sharonda Dmuas RN

## 2021-06-06 NOTE — TELEPHONE ENCOUNTER
ANTICOAGULATION  MANAGEMENT    Assessment     Today's INR result of 2.6 is Therapeutic (goal INR of 2.0-3.0)        Warfarin taken as previously instructed    No new diet changes affecting INR    No new medication/supplements affecting INR    Continues to tolerate warfarin with no reported s/s of bleeding or thromboembolism     Previous INR was Therapeutic    Plan:     Spoke with patient's spouse Marisabel regarding INR result and instructed:     Warfarin Dosing Instructions:  Continue current warfarin dose    5 mg every Sun; 2.5 mg all other days      (0 % change)    Instructed patient to follow up no later than: 4- 5 weeks - appointment made.    Education provided: importance of therapeutic range, target INR goal and significance of current INR result, importance of notifying clinic for changes in medications and monitoring for bleeding signs and symptoms    Marisabel verbalizes understanding and agrees to warfarin dosing plan.    Instructed to call the [facility] for any changes, questions or concerns. (#[phone])   ?   Sharonda Dumas RN    Subjective/Objective:      Antonio White, a 84 y.o. male is on warfarin.     Antonio reports:     Home warfarin dose: verbally confirmed home dose with Marisabel and updated on anticoagulation calendar     Missed doses: No     Medication changes:  No     S/S of bleeding or thromboembolism:  No     New Injury or illness:  No     Changes in diet or alcohol consumption:  No     Upcoming surgery, procedure or cardioversion:  No    Anticoagulation Episode Summary     Current INR goal:   2.0-3.0   TTR:   76.6 % (1 y)   Next INR check:   4/21/2020   INR from last check:   2.60 (3/17/2020)   Weekly max warfarin dose:      Target end date:      INR check location:      Preferred lab:      Send INR reminders to:   [facility]    Indications    Atrial fibrillation (H) [I48.91]           Comments:   chronic           Anticoagulation Care Providers     Provider Role Specialty  Phone number    Joey Garcia MD Referring Cardiology [phone]

## 2021-06-07 NOTE — TELEPHONE ENCOUNTER
ANTICOAGULATION  MANAGEMENT    Assessment     Today's INR result of 2.3 is Therapeutic (goal INR of 2.0-3.0)        Warfarin taken as previously instructed    No new diet changes affecting INR    No new medication/supplements affecting INR    Continues to tolerate warfarin with no reported s/s of bleeding or thromboembolism     Previous INR was Therapeutic    Plan:     Spoke with patient's spouse Marisabel regarding INR result and instructed:     Warfarin Dosing Instructions:  Continue current warfarin dose    5 mg every Sun; 2.5 mg all other days      (0 % change)    Instructed patient to follow up no later than: 6 weeks appointment made.    Education provided: importance of therapeutic range    Marisabel verbalizes understanding and agrees to warfarin dosing plan.    Instructed to call the [facility] for any changes, questions or concerns. (#[phone])   ?   Sharonda Dumas RN    Subjective/Objective:      Antonio White, a 84 y.o. male is on warfarin.     Antonio reports:     Home warfarin dose: verbally confirmed home dose with patient's spouse Marisabel and updated on anticoagulation calendar     Missed doses: No     Medication changes:  No     S/S of bleeding or thromboembolism:  No     New Injury or illness:  No     Changes in diet or alcohol consumption:  No     Upcoming surgery, procedure or cardioversion:  No    Anticoagulation Episode Summary     Current INR goal:   2.0-3.0   TTR:   79.3 % (1 y)   Next INR check:   6/2/2020   INR from last check:   2.30 (4/21/2020)   Weekly max warfarin dose:      Target end date:      INR check location:      Preferred lab:      Send INR reminders to:   [facility]    Indications    Atrial fibrillation (H) [I48.91]           Comments:   chronic           Anticoagulation Care Providers     Provider Role Specialty Phone number    Joey Garcia MD Referring Cardiology [phone]

## 2021-06-08 NOTE — TELEPHONE ENCOUNTER
ANTICOAGULATION  MANAGEMENT    Assessment     Today's INR result of 2.2 is Therapeutic (goal INR of 2.0-3.0)        Warfarin taken as previously instructed    No new diet changes affecting INR    No new medication/supplements affecting INR    Continues to tolerate warfarin with no reported s/s of bleeding or thromboembolism     Previous INR was Therapeutic    Plan:     Spoke with Marisabel regarding INR result and instructed:     Warfarin Dosing Instructions:  Continue current warfarin dose    5 mg every Sun; 2.5 mg all other days      (0 % change)    Instructed patient to follow up no later than: 6 weeks, appointment made.    Education provided: importance of therapeutic range and target INR goal and significance of current INR result    Marisabel verbalizes understanding and agrees to warfarin dosing plan.    Instructed to call the [facility] for any changes, questions or concerns. (#[phone])   ?   Sharonda Dumas RN    Subjective/Objective:      Antonio White, a 85 y.o. male is on warfarin.     Antonio reports:     Home warfarin dose: verbally confirmed home dose with Marisabel and updated on anticoagulation calendar     Missed doses: No     Medication changes:  No     S/S of bleeding or thromboembolism:  No     New Injury or illness:  No     Changes in diet or alcohol consumption:  No     Upcoming surgery, procedure or cardioversion:  No    Anticoagulation Episode Summary     Current INR goal:   2.0-3.0   TTR:   79.3 % (1 y)   Next INR check:   7/15/2020   INR from last check:   2.20 (6/3/2020)   Weekly max warfarin dose:      Target end date:      INR check location:      Preferred lab:      Send INR reminders to:   [facility]ART [facility]ARE    Indications    Atrial fibrillation (H) [I48.91]           Comments:   chronic           Anticoagulation Care Providers     Provider Role Specialty Phone number    Joey Garcia MD Referring Cardiology [phone]

## 2021-06-08 NOTE — PROGRESS NOTES
3.7 INR high today. Pt has fallen and has a contusion to hip. Will take 2.5 mg and retest in 2 weeks. After talking with pt and discussing history of greens/salads and medication change. Pt will  continue  with current diet and dosing of Warfarin.  Continue with moderation of Vit K and green leafy vegetables. Cautioned to call with increase bruising or bleeding. Reminded to call with medication change especially antibiotic. Call with any questions or concerns or any up coming procedures. Cautioned about using Herbal medication.

## 2021-06-08 NOTE — PROGRESS NOTES
After talking with pt and discussing history of greens/salads and medication change. Pt will  continue  with current diet and dosing of Warfarin.  Continue with moderation of Vit K and green leafy vegetables. Cautioned to call with increase bruising or bleeding. Reminded to call with medication change especially antibiotic. Call with any questions or concerns or any up coming procedures. Cautioned about using Herbal medication.

## 2021-06-09 ENCOUNTER — RECORDS - HEALTHEAST (OUTPATIENT)
Dept: LAB | Facility: CLINIC | Age: 86
End: 2021-06-09

## 2021-06-09 NOTE — TELEPHONE ENCOUNTER
Wellness Screening Tool  Symptom Screening:  Do you have one of the following NEW symptoms:    Fever (subjective or >100.0)?  No    A new cough?  No    Shortness of breath?  No     Chills? No     New loss of taste or smell? No     Generalized body aches? No     New persistent headache? No     New sore throat? No     Nausea, vomiting, or diarrhea?  No    Within the past 3 weeks, have you been exposed to someone with a known positive illness below:    COVID-19 (known or suspected)?  No    Chicken pox?  No    Mealses?  No    Pertussis?  No    Patient notified of visitor policy- They may have one person accompany them to their appointment, but they will need to wear a mask and will be screened upon arrival for symptoms: Yes  Pt informed to wear a mask: Yes  Pt notified if they develop any symptoms listed above, prior to their appointment, they are to call the clinic directly at [phone] for further instructions.  Yes  Patient's appointment status: Patient will be seen in clinic as scheduled on 7/15/20

## 2021-06-09 NOTE — PROGRESS NOTES
INR 1.2 post colonoscopy. Will increase today and Wed dose to 5 mg and then 2.5 mg daily. Retest in one week. After talking with pt and discussing history of greens/salads and medication change. Pt will  continue  with current diet and dosing of Warfarin.  Continue with moderation of Vit K and green leafy vegetables. Cautioned to call with increase bruising or bleeding. Reminded to call with medication change especially antibiotic. Call with any questions or concerns or any up coming procedures. Cautioned about using Herbal medication.

## 2021-06-09 NOTE — TELEPHONE ENCOUNTER
ANTICOAGULATION  MANAGEMENT    Assessment     Today's INR result of 5.4 is Supratherapeutic (goal INR of 2.0-3.0)        Warfarin taken as previously instructed    No new diet changes affecting INR    No new medication/supplements affecting INR    Continues to tolerate warfarin with no reported s/s of bleeding or thromboembolism     Previous INR was Supratherapeutic     Patient fell hit buttocks on floor and scraped left  Arm ( above the elbow and hand )- this happened on 7/21 bruise on upper arm- no bleeding and denied hitting head.         Plan:     Spoke with Marisabel regarding INR result and instructed:     Warfarin Dosing Instructions:  Hold warfarin doses today and tomorrow then change warfarin dose to    2.5 mg every day      (12.5 % change)    Instructed patient to follow up no later than: 7/29 appointment made.    Education provided: importance of therapeutic range, target INR goal and significance of current INR result and monitoring for bleeding signs and symptoms    Marisabel verbalizes understanding and agrees to warfarin dosing plan.    Instructed to call the [facility] for any changes, questions or concerns. (#[phone])   ?   Sharonda Dumas RN    Subjective/Objective:      Antonio White, a 85 y.o. male is on warfarin.     Antonio reports:     Home warfarin dose: verbally confirmed home dose with Confirmed and updated on anticoagulation calendar     Missed doses: No     Medication changes:  No     S/S of bleeding or thromboembolism:  No     New Injury or illness:  Yes: patient fell hit buttocks on floor and scraped left  Arm ( above the elbow and hand )- this happened on 7/21 bruise on upper arm- no bleeding and denied hitting head.     Changes in diet or alcohol consumption:  No     Upcoming surgery, procedure or cardioversion:  No    Anticoagulation Episode Summary     Current INR goal:   2.0-3.0   TTR:   70.0 % (1 y)   Next INR check:   7/30/2020   INR from last check:   5.40! (7/24/2020)   Weekly max  warfarin dose:      Target end date:      INR check location:      Preferred lab:      Send INR reminders to:   [facility]    Indications    Atrial fibrillation (H) [I48.91]           Comments:   chronic           Anticoagulation Care Providers     Provider Role Specialty Phone number    Joey Garcia MD Referring Cardiology [phone]

## 2021-06-09 NOTE — PROGRESS NOTES
INR 2.3 doing well but having a colonoscopy on 3/20. Will hold for 4 days before and restart the night of procedure if fine with GI. Will retest INR  On 3/28. Dr. Garcia aware of procedure. After talking with pt and discussing history of greens/salads and medication change. Pt will  continue  with current diet and dosing of Warfarin.  Continue with moderation of Vit K and green leafy vegetables. Cautioned to call with increase bruising or bleeding. Reminded to call with medication change especially antibiotic. Call with any questions or concerns or any up coming procedures. Cautioned about using Herbal medication.

## 2021-06-09 NOTE — TELEPHONE ENCOUNTER
Wellness Screening Tool  Symptom Screening:  Do you have one of the following NEW symptoms:    Fever (subjective or >100.0)?  No    A new cough?  No    Shortness of breath?  No     Chills? No     New loss of taste or smell? No     Generalized body aches? No     New persistent headache? No     New sore throat? No     Nausea, vomiting, or diarrhea?  No    Within the past 3 weeks, have you been exposed to someone with a known positive illness below:    COVID-19 (known or suspected)?  No    Chicken pox?  No    Mealses?  No    Pertussis?  No    Patient notified of visitor policy- They may have one person accompany them to their appointment, but they will need to wear a mask and will be screened upon arrival for symptoms: Yes  Pt informed to wear a mask: Yes  Pt notified if they develop any symptoms listed above, prior to their appointment, they are to call the clinic directly at [phone] for further instructions.  Yes  Patient's appointment status: Patient will be seen in clinic as scheduled on July 24

## 2021-06-09 NOTE — PROGRESS NOTES
INR 1.9 will increase Weds to 5 mg and 2.5 mg all other days. Retest in 2 weeks. Calendar printed and appointment made. After talking with pt and discussing history of greens/salads and medication change. Pt will  continue  with current diet and dosing of Warfarin.  Continue with moderation of Vit K and green leafy vegetables. Cautioned to call with increase bruising or bleeding. Reminded to call with medication change especially antibiotic. Call with any questions or concerns or any up coming procedures. Cautioned about using Herbal medication.

## 2021-06-09 NOTE — TELEPHONE ENCOUNTER
ANTICOAGULATION  MANAGEMENT    Assessment     Today's INR result of 4.2 is Supratherapeutic (goal INR of 2.0-3.0)        Warfarin taken as previously instructed    Marisabel  is getting chemo again and not able to monitor patient's food intake may be affecting diet and INR    No new medication/supplements affecting INR    Continues to tolerate warfarin with no reported s/s of bleeding or thromboembolism     Previous INR was Therapeutic    Plan:     Spoke with patient's spouse Marisabel regarding INR result and instructed:     Warfarin Dosing Instructions:  hold warfarin dose today then one time lower dose of 1.25 mg tomorrow then continue current warfarin dose    5 mg every Sun; 2.5 mg all other days     (0 % change)    Instructed patient to follow up no later than: 7-10 days appointment made.    Education provided: importance of therapeutic range, target INR goal and significance of current INR result and monitoring for bleeding signs and symptoms    Marisabel verbalizes understanding and agrees to warfarin dosing plan.    Instructed to call the [facility] for any changes, questions or concerns. (#[phone])   ?   Sharonda Dumas RN    Subjective/Objective:      Antonio White, a 85 y.o. male is on warfarin.     Antonio reports:     Home warfarin dose: verbally confirmed home dose with Marisabel and updated on anticoagulation calendar     Missed doses: No     Medication changes:  No     S/S of bleeding or thromboembolism:  No     New Injury or illness:  No     Changes in diet or alcohol consumption:  Yes: see above     Upcoming surgery, procedure or cardioversion:  No    Anticoagulation Episode Summary     Current INR goal:   2.0-3.0   TTR:   72.4 % (1 y)   Next INR check:   7/24/2020   INR from last check:   4.20! (7/15/2020)   Weekly max warfarin dose:      Target end date:      INR check location:      Preferred lab:      Send INR reminders to:   [facility]    Indications    Atrial fibrillation (H)  [I48.91]           Comments:   chronic           Anticoagulation Care Providers     Provider Role Specialty Phone number    Joey Garcia MD Referring Cardiology [phone]

## 2021-06-10 NOTE — PROGRESS NOTES
[facility]    DATE: 2020    NAME:  Antonio White             :  1935  MRN: [id number]  CODE STATUS:  FULL CODE    VISIT TYPE: H & P (admit tcu, pneumonia, falls, gi bleed)     [id number]       CHIEF COMPLAIN/REASON FOR VISIT:    Chief Complaint   Patient presents with     H & P     admit tcu, pneumonia, falls, gi bleed               HISTORY OF PRESENT ILLNESS: Antonio White is a 85 y.o. male who was admitted - for falls, confusion and found to have pneumonia and anemia. He underwent colonoscopy for lower GI bleeding and acute blood loss from hg 12.3-9.3. He was found to have large transverse polyp, sigmoid diverticulum, large hemorrhoids. His aspirin was held but coumadin eventually resumed and hemoglobin stable at 8 by discharge. He had LLL CAP and was treated with antibiotics. He had metabolic encephalopathy that improved by discharge. He was recommended follow up  With GI and tcu placement for rehab. He has PMH of frequent falls, A fib, HTN, HLD, CKd stage 3, NORMA. Prior to this he was living at home.     Today Mr. White is seen for hospital follow up and admission to TCU visit after pneumonia, falls, and gi bleeding. He is seen in his room in bed today. He says he has no shortness of breath or cough and did not realize he had pneumonia. He thinks his bowels are moving regularly and no urinary concerns. He says he has no swelling in his legs at this time. He does not recall seeing any blood in his stool recently. He has 3 children and would like to remain a full code until he can discuss further with them. He is mildly confused today. He uses a cane to get around at home. He does use a CPAP at home but doesn't have it here. He denies any other medication concerns today. Nursing denies any acute concerns today.     REVIEW OF SYSTEMS:  PROBLEMS AND REVIEW OF SYSTEMS:   Today on ROS:   Currently, no fever, chills,  or rigors. Decreased vision and hearing. Denies any chest pain, headaches, palpitations, lightheadedness, dizziness, shortness of breath, or cough. Appetite is good. Denies any GERD symptoms. Denies any difficulty with swallowing, nausea, or vomiting.  Denies any abdominal pain, diarrhea or constipation. Denies any urinary symptoms. No insomnia.  Positive for weakness, unable to obtain further ROS due to confusion       Allergies   Allergen Reactions     Levaquin [Levofloxacin] Itching and Rash     Pt developed itching and redness of forearm with iv administration, resolved with d/c of infusion.     Current Outpatient Medications   Medication Sig     acetaminophen (TYLENOL) 500 MG tablet Take 1-2 tablets (500-1,000 mg total) by mouth every 4 (four) hours as needed. (Patient taking differently: Take 1,000 mg by mouth every 6 (six) hours as needed. )     FERROUS SULFATE (SLOW FE ORAL) Take 45 mg by mouth every other day.      leuprolide, 6 month, (LUPRON DEPOT, 6 MONTH,) 45 mg SyKt Inject 45 mg into the shoulder, thigh, or buttocks every 6 (six) months.     lidocaine 4 % patch Place 1 patch on the skin daily. Remove and discard patch with 12 hours or as directed by MD.     metoprolol tartrate (LOPRESSOR) 25 MG tablet Take 0.5 tablets (12.5 mg total) by mouth daily.     multivitamin therapeutic (THERAGRAN) tablet Take 1 tablet by mouth daily.     polyethylene glycol (MIRALAX) 17 gram packet Take 1 packet (17 g total) by mouth daily as needed.     pravastatin (PRAVACHOL) 80 MG tablet Take 1 tablet by mouth bedtime.     warfarin ANTICOAGULANT (COUMADIN/JANTOVEN) 2.5 MG tablet Take 1.25-2.5 mg by mouth See Admin Instructions. 8/20/20 INR 2.17 Cont 1.25mg MWF, 2.5mg AOD. Next INR 8/24 8/18/20 INR 1.82 1.25mg MWF, 2.5mg AOD.  1.25 mg every Mon, Wed, Fri; 2.5 mg all other days.  Adjust dose based on INR results as directed.     Past Medical History:    Past Medical History:   Diagnosis Date     Anemia      Aortic  regurgitation      Atrial fibrillation (H)      Cardiac murmur      Chronic kidney disease     stage 3     Coronary artery disease      GERD (gastroesophageal reflux disease)      Hearing loss      History of prostate cancer      Hyperlipidemia      Hypertension      Malignant melanoma (H)     left chest     Malignant melanoma (H)      MI (myocardial infarction) (H) 2013    x1     Mitral valve prolapse      Osteoporosis      Personal history of thromboembolic disease 3/6/2016    History thromboembolic MI in 2013; continue indefinite anticoagulation unless risks outweight benefits.     Prostate cancer (H)     had radioactive seed treatment     Sleep apnea     no CPAP           PHYSICAL EXAMINATION  Vitals:    08/19/20 0700   BP: 93/60   Pulse: 68   Resp: 18   Temp: 98.7  F (37.1  C)   SpO2: 98%       Today on physical exam:     GENERAL: Awake, Alert,  not in any form of acute distress, answers questions appropriately, follows simple commands, conversant, weakness  HEENT: Head is normocephalic with normal hair distribution. No evidence of trauma. Ears: No acute purulent discharge. Eyes: Conjunctivae pink with no scleral jaundice. Nose: Normal mucosa and septum. NECK: Supple with no cervical or supraclavicular lymphadenopathy. Trachea is midline. Decreased vision and hearing  CHEST: No tenderness or deformity, no crepitus  LUNG: dim to auscultation with good chest expansion. There are no crackles or wheezes, normal AP diameter. No shortness of breath or cough noted on exam  BACK: No kyphosis of the thoracic spine. Symmetric, no curvature, ROM normal, no CVA tenderness, no spinal tenderness   CVS: irregularly irregular rhythm, there are no murmurs, rubs, gallops, or heaves,  2+ pulses symmetric in all extremities.  ABDOMEN: Rounded and soft, nontender to palpation, non distended, no masses, no organomegaly, good bowel sounds, no rebound or guarding, no peritoneal signs.   EXTREMITIES: No pedal edema, Left thigh  "extensive ecchymosis healing, scattered abrasions and ecchymosis on bilateral upper and lower extremities  SKIN: Warm and dry, no erythema noted.  Skin color, texture, no rashes or lesions.  NEUROLOGICAL: The patient is oriented to person, place, not to time. Otherwise confused, forgetful            LABS:   Recent Results (from the past 168 hour(s))   Hemoglobin   Result Value Ref Range    Hemoglobin 8.9 (L) 14.0 - 18.0 g/dL   INR   Result Value Ref Range    INR 1.71 (H) 0.90 - 1.10   Creatinine   Result Value Ref Range    Creatinine 1.07 0.70 - 1.30 mg/dL    GFR MDRD Af Amer >60 >60 mL/min/1.73m2    GFR MDRD Non Af Amer >60 >60 mL/min/1.73m2   Protime-INR   Result Value Ref Range    INR 1.61 (H) 0.90 - 1.10   Hemoglobin   Result Value Ref Range    Hemoglobin 9.5 (L) 14.0 - 18.0 g/dL   Surgical Pathology Exam   Result Value Ref Range    Case Report       Surgical Pathology                                Case: [id number]                                    Authorizing Provider:  Giancarlo Cardona MD        Collected:           08/14/2020 1325              Ordering Location:     [facility] OR     Received:            08/14/2020 1344              Pathologist:           Jong Medrano MD                                                      Specimen:    Colon, Polyp, Transverse, Transverse Polyp                                                 Final Diagnosis       BIOPSY OF TRANSVERSE COLON:    -  TUBULAR ADENOMA     -  NEGATIVE FOR HIGH GRADE EPITHELIAL DYSPLASIA     -  NORMAL COLON MUCOSA AT BASE OF LESION    Microscopic Description       Microscopic examination performed, substantiating the above diagnosis.    Clinical Information Pre-op Diagnosis:   Lower GI bleed [K92.2]     Gross Description       Submitted in formalin in a container labeled with the patient's name, and designated \"transverse polyp,\" is a polypoid fragment of pink-tan to gray mucosa measuring 0.8 x 0.8 cm, with thickness of 0.3 cm. Blue " ink is applied to the apparent surgical margin. T&TE-1C    Charges CPT:  41579  ICD-10:  D12.3     Result Flag     INR   Result Value Ref Range    INR 1.35 (H) 0.90 - 1.10   HM2(CBC w/o Differential)   Result Value Ref Range    WBC 5.1 4.0 - 11.0 thou/uL    RBC 2.79 (L) 4.40 - 6.20 mill/uL    Hemoglobin 8.9 (L) 14.0 - 18.0 g/dL    Hematocrit 27.3 (L) 40.0 - 54.0 %    MCV 98 80 - 100 fL    MCH 31.9 27.0 - 34.0 pg    MCHC 32.6 32.0 - 36.0 g/dL    RDW 12.6 11.0 - 14.5 %    Platelets 157 140 - 440 thou/uL    MPV 9.5 8.5 - 12.5 fL   Creatinine   Result Value Ref Range    Creatinine 1.19 0.70 - 1.30 mg/dL    GFR MDRD Af Amer >60 >60 mL/min/1.73m2    GFR MDRD Non Af Amer 58 (L) >60 mL/min/1.73m2   INR   Result Value Ref Range    INR 1.27 (H) 0.90 - 1.10   Hemoglobin   Result Value Ref Range    Hemoglobin 9.0 (L) 14.0 - 18.0 g/dL   INR   Result Value Ref Range    INR 1.70 (H) 0.90 - 1.10   INR   Result Value Ref Range    INR 1.82 (H) 0.90 - 1.10     Results for orders placed or performed during the hospital encounter of 10/30/19   Basic Metabolic Panel   Result Value Ref Range    Sodium 144 136 - 145 mmol/L    Potassium 4.6 3.5 - 5.0 mmol/L    Chloride 111 (H) 98 - 107 mmol/L    CO2 22 22 - 31 mmol/L    Anion Gap, Calculation 11 5 - 18 mmol/L    Glucose 145 (H) 70 - 125 mg/dL    Calcium 10.6 (H) 8.5 - 10.5 mg/dL    BUN 38 (H) 8 - 28 mg/dL    Creatinine 1.24 0.70 - 1.30 mg/dL    GFR MDRD Af Amer >60 >60 mL/min/1.73m2    GFR MDRD Non Af Amer 56 (L) >60 mL/min/1.73m2         Lab Results   Component Value Date    WBC 5.1 08/15/2020    HGB 9.0 (L) 08/16/2020    HCT 27.3 (L) 08/15/2020    MCV 98 08/15/2020     08/15/2020       Lab Results   Component Value Date    GJDLESDA34 528 02/24/2015     No results found for: HGBA1C  Lab Results   Component Value Date    INR 1.82 (H) 08/18/2020    INR 1.70 (H) 08/17/2020    INR 1.27 (H) 08/16/2020     Vitamin D, Total (25-Hydroxy)   Date Value Ref Range Status   10/01/2019 50.1 30.0  - 80.0 ng/mL Final     Lab Results   Component Value Date    TSH 1.15 08/13/2020           ASSESSMENT/PLAN:    Lower GI Bleed, melena: Colonoscopy o n 8/14-large transverse polyp, sigmoid diverticulum, large hemorrhoids. Aspirin stopped, remains on warfarin. Monitor for further signs of blood loss. F/u  With GI in 2 weeks. Will monitor anemia. No n/v/abdominal pain. Tolerating diet. Discussed with him and nursing symptoms to monitor for and when to notify for concerns.   Acute blood loss anemia: hg 12.3-9.3 during hospital course, hg 9 on 8/16. On slow iron every other day. Repeat on 8/24.   LLL Pneumonia: completed antibiotics, no o2 use, no shortness of breath or cough. Ordered IS q1h while awake.   Atrial fibrillation: rate controlled in 60s. On warfarin, inr today. On metoprolol, added hold parameters.   HTN: SBP 90s. On metoprolol, added hold parameter. Encourage fluids. Asymptomatic.   CKD stage 3: cmp ordered for 8/24.   NORMA: Ordered to have family bring CPAP from home and use at bedtime.   HLD: on pravastatin. Aspirin stopped.   Constipation: miralax daily prn. No concerns.   Chronic back pain: on lidoderm patch, tylenol prn. Reports pain controlled today.   Protein calorie malnutrition: ordered daily weights, dietary following. Concerns for failure to thrive at home, frequent falls. No admission weight on record. Discussed with nursing.   Frequent Falls: discussed with nursing social service consult, fall prevention protocol. PT, OT following. Scattered abrasions, left thigh ecchymosis. Uses can at home, now recommended to use walker.     Electronically signed by: Francheska Messina NP    Total floor/unit time spent 47 min with >50% time spent on counseling and coordination of care. Counseling was done regarding gi bleed. Coordinated care with nursing for malnutrition, falls, and gi bleed management.

## 2021-06-10 NOTE — ANESTHESIA CARE TRANSFER NOTE
Last vitals:   Vitals:    08/14/20 1343   BP: (!) 80/51   Pulse: 65   Resp: 24   Temp: 37.1  C (98.7  F)   SpO2: 100%     Patient's level of consciousness is drowsy  Spontaneous respirations: yes  Maintains airway independently: yes  Dentition unchanged: yes  Oropharynx: oropharynx clear of all foreign objects    QCDR Measures:  ASA# 20 - Surgical Safety Checklist: WHO surgical safety checklist completed prior to induction    PQRS# 430 - Adult PONV Prevention: MAC case  ASA# 8 - Peds PONV Prevention: NA - Not pediatric patient, not GA or 2 or more risk factors NOT present  PQRS# 424 - Zoie-op Temp Management: 4559F - At least one body temp DOCUMENTED => 35.5C or 95.9F within required timeframe  PQRS# 426 - PACU Transfer Protocol: - Transfer of care checklist used  ASA# 14 - Acute Post-op Pain: ASA14B - Patient did NOT experience pain >= 7 out of 10

## 2021-06-10 NOTE — TELEPHONE ENCOUNTER
ANTICOAGULATION  MANAGEMENT    Assessment     Today's INR result of 3.6 is Supratherapeutic (goal INR of 2.0-3.0)        Warfarin recently held on 7/24 and 7/25 as instructed which may be affecting INR    No new diet changes affecting INR    No new medication/supplements affecting INR    Continues to tolerate warfarin with no reported s/s of bleeding or thromboembolism     Previous INR was Supratherapeutic    Plan:     Spoke with patient's spouse Marisabel regarding INR result and instructed:     Warfarin Dosing Instructions:  hold warfarin dose today then change warfarin dose to    1.25 mg every Wed; 2.5 mg all other days     (7 % change)    Instructed patient to follow up no later than: 7- 10 days appointment made.    Education provided: importance of therapeutic range and target INR goal and significance of current INR result    Marisabel verbalizes understanding and agrees to warfarin dosing plan.    Instructed to call the [facility] for any changes, questions or concerns. (#[phone])   ?   Sharonda Dumas RN    Subjective/Objective:      Antonio White, a 85 y.o. male is on warfarin.     Antonio reports:     Home warfarin dose: verbally confirmed home dose with Marisabel and updated on anticoagulation calendar     Missed doses: Yes: on 7/24 and 7/25 as instructed.     Medication changes:  No     S/S of bleeding or thromboembolism:  No     New Injury or illness:  No     Changes in diet or alcohol consumption:  No     Upcoming surgery, procedure or cardioversion:  No    Anticoagulation Episode Summary     Current INR goal:   2.0-3.0   TTR:   68.8 % (1 y)   Next INR check:   8/5/2020   INR from last check:   3.60! (7/29/2020)   Weekly max warfarin dose:      Target end date:      INR check location:      Preferred lab:      Send INR reminders to:   [facility]    Indications    Atrial fibrillation (H) [I48.91]           Comments:   chronic           Anticoagulation Care Providers     Provider Role  Specialty Phone number    Joey Garcia MD Referring Cardiology [phone]

## 2021-06-10 NOTE — PROGRESS NOTES
[facility]    DATE: 2020    NAME:  Antonio White             :  1935  MRN: [id number]  CODE STATUS:  FULL CODE    VISIT TYPE: Problem Visit (heel pain)     FACILITY:  [facility] [[id number]]       CHIEF COMPLAIN/REASON FOR VISIT:    Chief Complaint   Patient presents with     Problem Visit     heel pain               HISTORY OF PRESENT ILLNESS: Antonio White is a 85 y.o. male who was admitted - for falls, confusion and found to have pneumonia and anemia. He underwent colonoscopy for lower GI bleeding and acute blood loss from hg 12.3-9.3. He was found to have large transverse polyp, sigmoid diverticulum, large hemorrhoids. His aspirin was held but coumadin eventually resumed and hemoglobin stable at 8 by discharge. He had LLL CAP and was treated with antibiotics. He had metabolic encephalopathy that improved by discharge. He was recommended follow up  With GI and tcu placement for rehab. He has PMH of frequent falls, A fib, HTN, HLD, CKd stage 3, NORMA. Prior to this he was living at home.     Today Mr. White is seen today for follow up on pneumonia, gi bleed. He is complaining of right heel pain today as well. He says he has been doing pretty good and not having any issues with his bowels. He has not noted any blood in his stools recently. He says he is eating fine and has not had any recent stomach upset. He says his breathing is fine and therapy is going well. He has noticed that his right heel has been bothering him more and more and is quite painful this morning. We discussed that there is no skin breakdown on exam but will float his heels when in bed to prevent skin breakdown.     REVIEW OF SYSTEMS:  PROBLEMS AND REVIEW OF SYSTEMS:   Today on ROS:   Currently, no fever, chills, or rigors. Decreased vision and hearing. Denies any chest pain, headaches, palpitations, lightheadedness, dizziness, shortness of breath, or cough. Appetite is  good. Denies any GERD symptoms. Denies any difficulty with swallowing, nausea, or vomiting.  Denies any abdominal pain, diarrhea or constipation. Denies any urinary symptoms. No insomnia.  Positive for weakness, right heel pain, unable to obtain further ROS due to confusion       Allergies   Allergen Reactions     Levaquin [Levofloxacin] Itching and Rash     Pt developed itching and redness of forearm with iv administration, resolved with d/c of infusion.     Current Outpatient Medications   Medication Sig     acetaminophen (TYLENOL) 500 MG tablet Take 1-2 tablets (500-1,000 mg total) by mouth every 4 (four) hours as needed. (Patient taking differently: Take 1,000 mg by mouth every 6 (six) hours as needed. )     FERROUS SULFATE (SLOW FE ORAL) Take 45 mg by mouth every other day.      leuprolide, 6 month, (LUPRON DEPOT, 6 MONTH,) 45 mg SyKt Inject 45 mg into the shoulder, thigh, or buttocks every 6 (six) months.     lidocaine 4 % patch Place 1 patch on the skin daily. Remove and discard patch with 12 hours or as directed by MD.     metoprolol tartrate (LOPRESSOR) 25 MG tablet Take 0.5 tablets (12.5 mg total) by mouth daily.     multivitamin therapeutic (THERAGRAN) tablet Take 1 tablet by mouth daily.     polyethylene glycol (MIRALAX) 17 gram packet Take 1 packet (17 g total) by mouth daily as needed.     pravastatin (PRAVACHOL) 80 MG tablet Take 1 tablet by mouth bedtime.     warfarin ANTICOAGULANT (COUMADIN/JANTOVEN) 2.5 MG tablet Take 1.25-2.5 mg by mouth See Admin Instructions. 8/20/20 INR 2.17 Cont 1.25mg MWF, 2.5mg AOD. Next INR 8/24 8/18/20 INR 1.82 1.25mg MWF, 2.5mg AOD.  1.25 mg every Mon, Wed, Fri; 2.5 mg all other days.  Adjust dose based on INR results as directed.     Past Medical History:    Past Medical History:   Diagnosis Date     Anemia      Aortic regurgitation      Atrial fibrillation (H)      Cardiac murmur      Chronic kidney disease     stage 3     Coronary artery disease      GERD  (gastroesophageal reflux disease)      Hearing loss      History of prostate cancer      Hyperlipidemia      Hypertension      Malignant melanoma (H)     left chest     Malignant melanoma (H)      MI (myocardial infarction) (H) 2013    x1     Mitral valve prolapse      Osteoporosis      Personal history of thromboembolic disease 3/6/2016    History thromboembolic MI in 2013; continue indefinite anticoagulation unless risks outweight benefits.     Prostate cancer (H)     had radioactive seed treatment     Sleep apnea     no CPAP           PHYSICAL EXAMINATION  Vitals:    08/24/20 0700   BP: 101/54   Pulse: 61   Resp: 18   Temp: 97.8  F (36.6  C)   SpO2: 95%   Weight: 119 lb (54 kg)       Today on physical exam:     GENERAL: Awake, Alert,  not in any form of acute distress, answers questions appropriately, follows simple commands, conversant, weakness  HEENT: Head is normocephalic with normal hair distribution. No evidence of trauma. Ears: No acute purulent discharge. Eyes: Conjunctivae pink with no scleral jaundice. Nose: Normal mucosa and septum. NECK: Supple with no cervical or supraclavicular lymphadenopathy. Trachea is midline. Decreased vision and hearing  CHEST: No tenderness or deformity, no crepitus  LUNG: dim to auscultation with good chest expansion. There are no crackles or wheezes, normal AP diameter. No shortness of breath or cough noted on exam  BACK: No kyphosis of the thoracic spine. Symmetric, no curvature, ROM normal, no CVA tenderness, no spinal tenderness   CVS: irregularly irregular rhythm, there are no murmurs, rubs, gallops, or heaves,  2+ pulses symmetric in all extremities.  ABDOMEN: Rounded and soft, nontender to palpation, non distended, no masses, no organomegaly, good bowel sounds, no rebound or guarding, no peritoneal signs.   EXTREMITIES: No pedal edema, Left thigh extensive ecchymosis healing, scattered abrasions and ecchymosis on bilateral upper and lower extremities  SKIN: Warm and  dry, right heel erythema noted but does wilmer, no skin breakdown, tender to palpation  NEUROLOGICAL: The patient is oriented to person, place, not to time. Otherwise confused, forgetful            LABS:   Recent Results (from the past 168 hour(s))   INR   Result Value Ref Range    INR 1.82 (H) 0.90 - 1.10   INR   Result Value Ref Range    INR 2.17 (H) 0.90 - 1.10   HM2(CBC w/o Differential)   Result Value Ref Range    WBC 5.9 4.0 - 11.0 thou/uL    RBC 2.71 (L) 4.40 - 6.20 mill/uL    Hemoglobin 8.5 (L) 14.0 - 18.0 g/dL    Hematocrit 26.3 (L) 40.0 - 54.0 %    MCV 97 80 - 100 fL    MCH 31.4 27.0 - 34.0 pg    MCHC 32.3 32.0 - 36.0 g/dL    RDW 12.9 11.0 - 14.5 %    Platelets 228 140 - 440 thou/uL    MPV 10.2 8.5 - 12.5 fL   INR   Result Value Ref Range    INR 2.22 (H) 0.90 - 1.10   Comprehensive Metabolic Panel   Result Value Ref Range    Sodium 141 136 - 145 mmol/L    Potassium 3.7 3.5 - 5.0 mmol/L    Chloride 112 (H) 98 - 107 mmol/L    CO2 21 (L) 22 - 31 mmol/L    Anion Gap, Calculation 8 5 - 18 mmol/L    Glucose 78 70 - 125 mg/dL    BUN 26 8 - 28 mg/dL    Creatinine 0.79 0.70 - 1.30 mg/dL    GFR MDRD Af Amer >60 >60 mL/min/1.73m2    GFR MDRD Non Af Amer >60 >60 mL/min/1.73m2    Bilirubin, Total 0.4 0.0 - 1.0 mg/dL    Calcium 10.3 8.5 - 10.5 mg/dL    Protein, Total 5.3 (L) 6.0 - 8.0 g/dL    Albumin 2.5 (L) 3.5 - 5.0 g/dL    Alkaline Phosphatase 55 45 - 120 U/L    AST 26 0 - 40 U/L    ALT 22 0 - 45 U/L   HM1 (CBC with Diff)   Result Value Ref Range    WBC 4.4 4.0 - 11.0 thou/uL    RBC 2.67 (L) 4.40 - 6.20 mill/uL    Hemoglobin 8.4 (L) 14.0 - 18.0 g/dL    Hematocrit 26.4 (L) 40.0 - 54.0 %    MCV 99 80 - 100 fL    MCH 31.5 27.0 - 34.0 pg    MCHC 31.8 (L) 32.0 - 36.0 g/dL    RDW 13.2 11.0 - 14.5 %    Platelets 266 140 - 440 thou/uL    MPV 9.9 8.5 - 12.5 fL    Neutrophils % 56 50 - 70 %    Lymphocytes % 24 20 - 40 %    Monocytes % 10 2 - 10 %    Eosinophils % 7 (H) 0 - 6 %    Basophils % 1 0 - 2 %    Immature Granulocyte  % 2 (H) <=0 %    Neutrophils Absolute 2.5 2.0 - 7.7 thou/uL    Lymphocytes Absolute 1.1 0.8 - 4.4 thou/uL    Monocytes Absolute 0.5 0.0 - 0.9 thou/uL    Eosinophils Absolute 0.3 0.0 - 0.4 thou/uL    Basophils Absolute 0.0 0.0 - 0.2 thou/uL    Immature Granulocyte Absolute 0.1 (H) <=0.0 thou/uL     Results for orders placed or performed during the hospital encounter of 10/30/19   Basic Metabolic Panel   Result Value Ref Range    Sodium 144 136 - 145 mmol/L    Potassium 4.6 3.5 - 5.0 mmol/L    Chloride 111 (H) 98 - 107 mmol/L    CO2 22 22 - 31 mmol/L    Anion Gap, Calculation 11 5 - 18 mmol/L    Glucose 145 (H) 70 - 125 mg/dL    Calcium 10.6 (H) 8.5 - 10.5 mg/dL    BUN 38 (H) 8 - 28 mg/dL    Creatinine 1.24 0.70 - 1.30 mg/dL    GFR MDRD Af Amer >60 >60 mL/min/1.73m2    GFR MDRD Non Af Amer 56 (L) >60 mL/min/1.73m2         Lab Results   Component Value Date    WBC 4.4 08/24/2020    HGB 8.4 (L) 08/24/2020    HCT 26.4 (L) 08/24/2020    MCV 99 08/24/2020     08/24/2020       Lab Results   Component Value Date    QNOVVFEL92 528 02/24/2015     No results found for: HGBA1C  Lab Results   Component Value Date    INR 2.22 (H) 08/24/2020    INR 2.17 (H) 08/20/2020    INR 1.82 (H) 08/18/2020     Vitamin D, Total (25-Hydroxy)   Date Value Ref Range Status   10/01/2019 50.1 30.0 - 80.0 ng/mL Final     Lab Results   Component Value Date    TSH 1.15 08/13/2020           ASSESSMENT/PLAN:    Lower GI Bleed, melena: Colonoscopy 8/14-large transverse polyp, sigmoid diverticulum, large hemorrhoids. Aspirin stopped, remains on warfarin. Monitor for further signs of blood loss. F/u  With GI in 2 weeks. No n/v/abdominal pain. Tolerating diet. Hg 8.5 on 8/20, dropped from 9 in hospital.  hemoccult stool x 3. Recheck on 8/24. No further signs of bleeding, denies stomach upset, nausea, pain, diarrhea, melena. No notifications from staff of positive hemoccult. Will await hemoglobin results today.   Acute blood loss anemia: hg 12.3-9.3  during hospital course, hg 9 on 8/16. On slow iron every other day. Repeat on 8/24-hg 8.5.    LLL Pneumonia: resolved. IS q1h while awake.   Atrial fibrillation: rate controlled in 60s. On warfarin, inr today. On metoprolol, added hold parameters.   HTN: SBP 100s. On metoprolol,  hold parameter. Encourage fluids. Asymptomatic.   CKD stage 3: cmp ordered for 8/24.   NORMA:  bring CPAP from home and use at bedtime.   HLD: on pravastatin. Aspirin stopped.   Constipation: miralax daily prn. No concerns.   Chronic back pain: on lidoderm patch, tylenol prn. Reports pain controlled today.   Protein calorie malnutrition: daily weights, dietary following. Concerns for failure to thrive at home, frequent falls. 118lbs on admit. Monitor daily.   Frequent Falls: discussed with nursing social service consult, fall prevention protocol. PT, OT following. Scattered abrasions, left thigh ecchymosis. Uses can at home, now recommended to use walker.   Right heel pain: blanchable erythema, will order to float heels when possible and prevalon boots when in bed.     Electronically signed by: Francheska Messina NP

## 2021-06-10 NOTE — PROGRESS NOTES
INR 1.9 increase dose to 5 mg Wed and Sat and 2.5 mg all other days. After talking with pt and discussing history of greens/salads and medication change. Pt will  continue  with current diet and dosing of Warfarin.  Continue with moderation of Vit K and green leafy vegetables. Cautioned to call with increase bruising or bleeding. Reminded to call with medication change especially antibiotic. Call with any questions or concerns or any up coming procedures. Cautioned about using Herbal medication.

## 2021-06-10 NOTE — PROGRESS NOTES
ANTICOAGULATION  MANAGEMENT: Discharge Review    Antonio White chart reviewed for anticoagulation continuity of care    Hospital admission on  8/12/8/18 for Fall;Altered Mental Status.Lower GI Bleed; pneumonia    8/14 Colonoscopy done.    Discharge disposition: TCU   [facility] TCU    INR Results:       Recent labs: (last 7 days)     08/12/20  1606 08/13/20  0548 08/14/20  0552 08/14/20  1039 08/15/20  0541 08/16/20  0552 08/17/20  0627 08/18/20  0640   INR 3.13* 3.15* 1.71* 1.61* 1.35* 1.27* 1.70* 1.82*       Warfarin inpatient management: reversed with 5 mg vitamin k  on 8/13 and resumed on 8/15/2020    Warfarin discharge instructions: home regimen continued     Medication Changes Affecting Anticoagulation: Yes: vit k, antibiotic    Additional Factors Affecting Anticoagulation: Yes: diagnosis GI Bleed and Pneumonia    Plan     Agree with dosing adjustment on discharge    ACM will resume monitoring upon discharge from TCU    Anticoagulation calendar updated    Sharonda Dumas RN

## 2021-06-10 NOTE — TELEPHONE ENCOUNTER
[facility] Nurse Triage Anticoagulation Note      Provider: CJ Cohen  Facility: [facility]    Facility Type: TCU    Caller: Navjotjose  Call Back Number:  [phone]    Reason for call: INR  Hgb 8.4, K 3.7 GFR >60  Today s INR: 2.22  Previous INR: 8/20/20 INR 2.17 1.25 MWF, 2.5mg AOD.    Diagnosis/Goal: AFIB  Heparin/Lovenox: No   Currently on ABX: No  Other interacting Medications: None  Missed or refused doses: No    Verbal Order/Direction given by Provider: Cont 1.25mg MWF, 2.5mg AOD. Next INR 8/27.    Provider giving order: CJ Cohen    Verbal order given to: Jarod Fine RN

## 2021-06-10 NOTE — ANESTHESIA POSTPROCEDURE EVALUATION
Patient: Antonio White  Procedure(s):  COLONOSCOPY  Anesthesia type: MAC    Patient location: PACU  Last vitals:   Vitals Value Taken Time   BP 92/54 8/14/2020  3:53 PM   Temp 37.2  C (99  F) 8/14/2020  3:53 PM   Pulse 64 8/14/2020  4:09 PM   Resp 18 8/14/2020  3:53 PM   SpO2 95 % 8/14/2020  3:53 PM   Vitals shown include unvalidated device data.  Post vital signs: stable  Level of consciousness: awake, alert and oriented  Post-anesthesia pain: pain controlled  Post-anesthesia nausea and vomiting: no  Pulmonary: unassisted, return to baseline  Cardiovascular: stable and blood pressure at baseline  Hydration: adequate  Anesthetic events: no    QCDR Measures:  ASA# 11 - Zoie-op Cardiac Arrest: ASA11B - Patient did NOT experience unanticipated cardiac arrest  ASA# 12 - Zoie-op Mortality Rate: ASA12B - Patient did NOT die  ASA# 13 - PACU Re-Intubation Rate: NA - No ETT / LMA used for case  ASA# 10 - Composite Anes Safety: ASA10A - No serious adverse event    Additional Notes:

## 2021-06-10 NOTE — PROGRESS NOTES
[facility]    DATE: 2020    NAME:  Antonio White             :  1935  MRN: [id number]  CODE STATUS:  FULL CODE    VISIT TYPE: Problem Visit (gi bleed, anemia)     FACILITY:  [facility] [[id number]]       CHIEF COMPLAIN/REASON FOR VISIT:    Chief Complaint   Patient presents with     Problem Visit     gi bleed, anemia               HISTORY OF PRESENT ILLNESS: Antonio White is a 85 y.o. male who was admitted - for falls, confusion and found to have pneumonia and anemia. He underwent colonoscopy for lower GI bleeding and acute blood loss from hg 12.3-9.3. He was found to have large transverse polyp, sigmoid diverticulum, large hemorrhoids. His aspirin was held but coumadin eventually resumed and hemoglobin stable at 8 by discharge. He had LLL CAP and was treated with antibiotics. He had metabolic encephalopathy that improved by discharge. He was recommended follow up  With GI and tcu placement for rehab. He has PMH of frequent falls, A fib, HTN, HLD, CKd stage 3, NORMA. Prior to this he was living at home.     Today Mr. White is seen for concerns of anemia and recent GI bleed. He is seen in his room today. He just finished working with therapy. He says he has been doing fine. He denies any concerns with his bowels. He is not sure if he is having blood in his stool again. He denies nausea or stomach upset and seems to be eating fine. He has no other complaints today. Discussed with him the drop in his hemoglobin recently and will collect 3 stool samples to check for hemoccult. Discussed with  Nursing to notify if positive.     REVIEW OF SYSTEMS:  PROBLEMS AND REVIEW OF SYSTEMS:   Today on ROS:   Currently, no fever, chills, or rigors. Decreased vision and hearing. Denies any chest pain, headaches, palpitations, lightheadedness, dizziness, shortness of breath, or cough. Appetite is good. Denies any GERD symptoms. Denies any difficulty with  swallowing, nausea, or vomiting.  Denies any abdominal pain, diarrhea or constipation. Denies any urinary symptoms. No insomnia.  Positive for weakness, unable to obtain further ROS due to confusion       Allergies   Allergen Reactions     Levaquin [Levofloxacin] Itching and Rash     Pt developed itching and redness of forearm with iv administration, resolved with d/c of infusion.     Current Outpatient Medications   Medication Sig     acetaminophen (TYLENOL) 500 MG tablet Take 1-2 tablets (500-1,000 mg total) by mouth every 4 (four) hours as needed. (Patient taking differently: Take 1,000 mg by mouth every 6 (six) hours as needed. )     FERROUS SULFATE (SLOW FE ORAL) Take 45 mg by mouth every other day.      leuprolide, 6 month, (LUPRON DEPOT, 6 MONTH,) 45 mg SyKt Inject 45 mg into the shoulder, thigh, or buttocks every 6 (six) months.     lidocaine 4 % patch Place 1 patch on the skin daily. Remove and discard patch with 12 hours or as directed by MD.     metoprolol tartrate (LOPRESSOR) 25 MG tablet Take 0.5 tablets (12.5 mg total) by mouth daily.     multivitamin therapeutic (THERAGRAN) tablet Take 1 tablet by mouth daily.     polyethylene glycol (MIRALAX) 17 gram packet Take 1 packet (17 g total) by mouth daily as needed.     pravastatin (PRAVACHOL) 80 MG tablet Take 1 tablet by mouth bedtime.     warfarin ANTICOAGULANT (COUMADIN/JANTOVEN) 2.5 MG tablet Take 1.25-2.5 mg by mouth See Admin Instructions. 8/20/20 INR 2.17 Cont 1.25mg MWF, 2.5mg AOD. Next INR 8/24 8/18/20 INR 1.82 1.25mg MWF, 2.5mg AOD.  1.25 mg every Mon, Wed, Fri; 2.5 mg all other days.  Adjust dose based on INR results as directed.     Past Medical History:    Past Medical History:   Diagnosis Date     Anemia      Aortic regurgitation      Atrial fibrillation (H)      Cardiac murmur      Chronic kidney disease     stage 3     Coronary artery disease      GERD (gastroesophageal reflux disease)      Hearing loss      History of prostate cancer       Hyperlipidemia      Hypertension      Malignant melanoma (H)     left chest     Malignant melanoma (H)      MI (myocardial infarction) (H) 2013    x1     Mitral valve prolapse      Osteoporosis      Personal history of thromboembolic disease 3/6/2016    History thromboembolic MI in 2013; continue indefinite anticoagulation unless risks outweight benefits.     Prostate cancer (H)     had radioactive seed treatment     Sleep apnea     no CPAP           PHYSICAL EXAMINATION  Vitals:    08/21/20 0700   BP: 103/57   Pulse: 72   Resp: 18   Temp: 98.7  F (37.1  C)   SpO2: 95%   Weight: 118 lb (53.5 kg)       Today on physical exam:     GENERAL: Awake, Alert,  not in any form of acute distress, answers questions appropriately, follows simple commands, conversant, weakness  HEENT: Head is normocephalic with normal hair distribution. No evidence of trauma. Ears: No acute purulent discharge. Eyes: Conjunctivae pink with no scleral jaundice. Nose: Normal mucosa and septum. NECK: Supple with no cervical or supraclavicular lymphadenopathy. Trachea is midline. Decreased vision and hearing  CHEST: No tenderness or deformity, no crepitus  LUNG: dim to auscultation with good chest expansion. There are no crackles or wheezes, normal AP diameter. No shortness of breath or cough noted on exam  BACK: No kyphosis of the thoracic spine. Symmetric, no curvature, ROM normal, no CVA tenderness, no spinal tenderness   CVS: irregularly irregular rhythm, there are no murmurs, rubs, gallops, or heaves,  2+ pulses symmetric in all extremities.  ABDOMEN: Rounded and soft, nontender to palpation, non distended, no masses, no organomegaly, good bowel sounds, no rebound or guarding, no peritoneal signs.   EXTREMITIES: No pedal edema, Left thigh extensive ecchymosis healing, scattered abrasions and ecchymosis on bilateral upper and lower extremities  SKIN: Warm and dry, no erythema noted.  Skin color, texture, no rashes or lesions.  NEUROLOGICAL: The  patient is oriented to person, place, not to time. Otherwise confused, forgetful            LABS:   Recent Results (from the past 168 hour(s))   INR   Result Value Ref Range    INR 1.35 (H) 0.90 - 1.10   HM2(CBC w/o Differential)   Result Value Ref Range    WBC 5.1 4.0 - 11.0 thou/uL    RBC 2.79 (L) 4.40 - 6.20 mill/uL    Hemoglobin 8.9 (L) 14.0 - 18.0 g/dL    Hematocrit 27.3 (L) 40.0 - 54.0 %    MCV 98 80 - 100 fL    MCH 31.9 27.0 - 34.0 pg    MCHC 32.6 32.0 - 36.0 g/dL    RDW 12.6 11.0 - 14.5 %    Platelets 157 140 - 440 thou/uL    MPV 9.5 8.5 - 12.5 fL   Creatinine   Result Value Ref Range    Creatinine 1.19 0.70 - 1.30 mg/dL    GFR MDRD Af Amer >60 >60 mL/min/1.73m2    GFR MDRD Non Af Amer 58 (L) >60 mL/min/1.73m2   INR   Result Value Ref Range    INR 1.27 (H) 0.90 - 1.10   Hemoglobin   Result Value Ref Range    Hemoglobin 9.0 (L) 14.0 - 18.0 g/dL   INR   Result Value Ref Range    INR 1.70 (H) 0.90 - 1.10   INR   Result Value Ref Range    INR 1.82 (H) 0.90 - 1.10   INR   Result Value Ref Range    INR 2.17 (H) 0.90 - 1.10   HM2(CBC w/o Differential)   Result Value Ref Range    WBC 5.9 4.0 - 11.0 thou/uL    RBC 2.71 (L) 4.40 - 6.20 mill/uL    Hemoglobin 8.5 (L) 14.0 - 18.0 g/dL    Hematocrit 26.3 (L) 40.0 - 54.0 %    MCV 97 80 - 100 fL    MCH 31.4 27.0 - 34.0 pg    MCHC 32.3 32.0 - 36.0 g/dL    RDW 12.9 11.0 - 14.5 %    Platelets 228 140 - 440 thou/uL    MPV 10.2 8.5 - 12.5 fL     Results for orders placed or performed during the hospital encounter of 10/30/19   Basic Metabolic Panel   Result Value Ref Range    Sodium 144 136 - 145 mmol/L    Potassium 4.6 3.5 - 5.0 mmol/L    Chloride 111 (H) 98 - 107 mmol/L    CO2 22 22 - 31 mmol/L    Anion Gap, Calculation 11 5 - 18 mmol/L    Glucose 145 (H) 70 - 125 mg/dL    Calcium 10.6 (H) 8.5 - 10.5 mg/dL    BUN 38 (H) 8 - 28 mg/dL    Creatinine 1.24 0.70 - 1.30 mg/dL    GFR MDRD Af Amer >60 >60 mL/min/1.73m2    GFR MDRD Non Af Amer 56 (L) >60 mL/min/1.73m2         Lab  Results   Component Value Date    WBC 5.9 08/20/2020    HGB 8.5 (L) 08/20/2020    HCT 26.3 (L) 08/20/2020    MCV 97 08/20/2020     08/20/2020       Lab Results   Component Value Date    [id number] 528 02/24/2015     No results found for: HGBA1C  Lab Results   Component Value Date    INR 2.17 (H) 08/20/2020    INR 1.82 (H) 08/18/2020    INR 1.70 (H) 08/17/2020     Vitamin D, Total (25-Hydroxy)   Date Value Ref Range Status   10/01/2019 50.1 30.0 - 80.0 ng/mL Final     Lab Results   Component Value Date    TSH 1.15 08/13/2020           ASSESSMENT/PLAN:    Lower GI Bleed, melena: Colonoscopy 8/14-large transverse polyp, sigmoid diverticulum, large hemorrhoids. Aspirin stopped, remains on warfarin. Monitor for further signs of blood loss. F/u  With GI in 2 weeks. No n/v/abdominal pain. Tolerating diet. Hg 8.5 on 8/20, dropped from 9 in hospital. Will hemoccult stool x 3. Recheck on 8/24. Monitor closely  For further signs of bleeding.   Acute blood loss anemia: hg 12.3-9.3 during hospital course, hg 9 on 8/16. On slow iron every other day. Repeat on 8/24-hg 8.5.    LLL Pneumonia: resolved. IS q1h while awake.   Atrial fibrillation: rate controlled in 70s. On warfarin, inr today. On metoprolol, added hold parameters.   HTN: SBP 100s. On metoprolol,  hold parameter. Encourage fluids. Asymptomatic.   CKD stage 3: cmp ordered for 8/24-  NORMA:  bring CPAP from home and use at bedtime.   HLD: on pravastatin. Aspirin stopped.   Constipation: miralax daily prn. No concerns.   Chronic back pain: on lidoderm patch, tylenol prn. Reports pain controlled today.   Protein calorie malnutrition: daily weights, dietary following. Concerns for failure to thrive at home, frequent falls. 118lbs on admit. Monitor daily.   Frequent Falls: discussed with nursing social service consult, fall prevention protocol. PT, OT following. Scattered abrasions, left thigh ecchymosis. Uses can at home, now recommended to use walker.      Electronically signed by: Francheska Messina NP

## 2021-06-10 NOTE — TELEPHONE ENCOUNTER
[facility] Nurse Triage Anticoagulation Note      Provider: CJ Cohen  Facility: [facility]    Facility Type: TCU    Caller: Jong  Call Back Number:  [phone]    Reason for call: INR    Today s INR: 2.08  Previous INR: 8/24 2.22(1.25mg M-W-F and 2.5mg AOD), 8/20 2.17(1.25mg M-W-F and 2.5mg AOD).      Diagnosis/Goal: AFIB  Heparin/Lovenox: No   Currently on ABX: No  Other interacting Medications: None  Missed or refused doses: No    Verbal Order/Direction given by Provider: Continue Warfarin 1.25mg M-W-F and 2.5mg all other days.  Next INR 8/31/20.      Provider giving order: CJ Cohen    Verbal order given to: Kathy Han RN

## 2021-06-10 NOTE — PROGRESS NOTES
[facility]    DATE: 2020    NAME:  Antonio White             :  1935  MRN: [id number]  CODE STATUS:  FULL CODE    VISIT TYPE: Review Of Multiple Medical Conditions (anemia, gi bleed)     FACILITY:  [facility] [[id number]]       CHIEF COMPLAIN/REASON FOR VISIT:    Chief Complaint   Patient presents with     Review Of Multiple Medical Conditions     anemia, gi bleed               HISTORY OF PRESENT ILLNESS: Antonio White is a 85 y.o. male who was admitted - for falls, confusion and found to have pneumonia and anemia. He underwent colonoscopy for lower GI bleeding and acute blood loss from hg 12.3-9.3. He was found to have large transverse polyp, sigmoid diverticulum, large hemorrhoids. His aspirin was held but coumadin eventually resumed and hemoglobin stable at 8 by discharge. He had LLL CAP and was treated with antibiotics. He had metabolic encephalopathy that improved by discharge. He was recommended follow up  With GI and tcu placement for rehab. He has PMH of frequent falls, A fib, HTN, HLD, CKd stage 3, NORMA. Prior to this he was living at home.     Today Mr. White is seen for review of systems for anemia, GI bleed. He says he has been doing fine lately. He does have some soreness in his left wrist and neck today. He denies noticing any blood in his stool. He thinks he is eating well and therapy is going fine. He says they work him too hard sometimes. He is not having any breathing issues or cough. No recent falls per staff report. His hemoglobin dropped again to 8.5 on .     REVIEW OF SYSTEMS:  PROBLEMS AND REVIEW OF SYSTEMS:   Today on ROS:   Currently, no fever, chills, or rigors. Decreased vision and hearing. Denies any chest pain, headaches, palpitations, lightheadedness, dizziness, shortness of breath, or cough. Appetite is good. Denies any GERD symptoms. Denies any difficulty with swallowing, nausea, or vomiting.  Denies any  abdominal pain, diarrhea or constipation. Denies any urinary symptoms. No insomnia.  Positive for weakness,  Left wrist pain, neck pain, unable to obtain further ROS due to confusion       Allergies   Allergen Reactions     Levaquin [Levofloxacin] Itching and Rash     Pt developed itching and redness of forearm with iv administration, resolved with d/c of infusion.     Current Outpatient Medications   Medication Sig     acetaminophen (TYLENOL) 500 MG tablet Take 1-2 tablets (500-1,000 mg total) by mouth every 4 (four) hours as needed. (Patient taking differently: Take 1,000 mg by mouth every 6 (six) hours as needed. )     FERROUS SULFATE (SLOW FE ORAL) Take 45 mg by mouth every other day.      leuprolide, 6 month, (LUPRON DEPOT, 6 MONTH,) 45 mg SyKt Inject 45 mg into the shoulder, thigh, or buttocks every 6 (six) months.     lidocaine 4 % patch Place 1 patch on the skin daily. Remove and discard patch with 12 hours or as directed by MD.     metoprolol tartrate (LOPRESSOR) 25 MG tablet Take 0.5 tablets (12.5 mg total) by mouth daily.     multivitamin therapeutic (THERAGRAN) tablet Take 1 tablet by mouth daily.     polyethylene glycol (MIRALAX) 17 gram packet Take 1 packet (17 g total) by mouth daily as needed.     pravastatin (PRAVACHOL) 80 MG tablet Take 1 tablet by mouth bedtime.     warfarin sodium (WARFARIN ORAL) Take by mouth. 8/27/20 INR 2.08  Cont 1.25mg M-W-F and 2.5mg AOD.  Next INR 8/31/20.    8/24/20 INR 2.22 Cont 1.25mg MWF, 2.5mg AOD. Next INR 8/27.  8/20/20 INR 2.17 Cont 1.25mg MWF, 2.5mg AOD. Next INR 8/24 8/18/20 INR 1.82 1.25mg MWF, 2.5mg AOD.  1.25 mg every Mon, Wed, Fri; 2.5 mg all other days.  Adjust dose based on INR results as directed.     Past Medical History:    Past Medical History:   Diagnosis Date     Anemia      Aortic regurgitation      Atrial fibrillation (H)      Cardiac murmur      Chronic kidney disease     stage 3     Coronary artery disease      GERD (gastroesophageal reflux  disease)      Hearing loss      History of prostate cancer      Hyperlipidemia      Hypertension      Malignant melanoma (H)     left chest     Malignant melanoma (H)      MI (myocardial infarction) (H) 2013    x1     Mitral valve prolapse      Osteoporosis      Personal history of thromboembolic disease 3/6/2016    History thromboembolic MI in 2013; continue indefinite anticoagulation unless risks outweight benefits.     Prostate cancer (H)     had radioactive seed treatment     Sleep apnea     no CPAP           PHYSICAL EXAMINATION  Vitals:    08/28/20 0700   BP: 112/66   Pulse: 67   Resp: 12   Temp: 97.4  F (36.3  C)   SpO2: 96%   Weight: 122 lb (55.3 kg)       Today on physical exam:     GENERAL: Awake, Alert,  not in any form of acute distress, answers questions appropriately, follows simple commands, conversant, weakness  HEENT: Head is normocephalic with normal hair distribution. No evidence of trauma. Ears: No acute purulent discharge. Eyes: Conjunctivae pink with no scleral jaundice. Nose: Normal mucosa and septum. NECK: Supple with no cervical or supraclavicular lymphadenopathy. Trachea is midline. Decreased vision and hearing  CHEST: No tenderness or deformity, no crepitus  LUNG: dim to auscultation with good chest expansion. There are no crackles or wheezes, normal AP diameter. No shortness of breath or cough noted on exam  BACK: No kyphosis of the thoracic spine. Symmetric, no curvature, ROM normal, no CVA tenderness, no spinal tenderness   CVS: irregularly irregular rhythm, there are no murmurs, rubs, gallops, or heaves,  2+ pulses symmetric in all extremities.  ABDOMEN: Rounded and soft, nontender to palpation, non distended, no masses, no organomegaly, good bowel sounds, no rebound or guarding, no peritoneal signs.   EXTREMITIES: No pedal edema, Left thigh extensive ecchymosis healing, scattered abrasions and ecchymosis on bilateral upper and lower extremities  SKIN: Warm and dry,   NEUROLOGICAL: The  patient is oriented to person, place, not to time. Otherwise confused, forgetful            LABS:   Recent Results (from the past 168 hour(s))   INR   Result Value Ref Range    INR 2.22 (H) 0.90 - 1.10   Comprehensive Metabolic Panel   Result Value Ref Range    Sodium 141 136 - 145 mmol/L    Potassium 3.7 3.5 - 5.0 mmol/L    Chloride 112 (H) 98 - 107 mmol/L    CO2 21 (L) 22 - 31 mmol/L    Anion Gap, Calculation 8 5 - 18 mmol/L    Glucose 78 70 - 125 mg/dL    BUN 26 8 - 28 mg/dL    Creatinine 0.79 0.70 - 1.30 mg/dL    GFR MDRD Af Amer >60 >60 mL/min/1.73m2    GFR MDRD Non Af Amer >60 >60 mL/min/1.73m2    Bilirubin, Total 0.4 0.0 - 1.0 mg/dL    Calcium 10.3 8.5 - 10.5 mg/dL    Protein, Total 5.3 (L) 6.0 - 8.0 g/dL    Albumin 2.5 (L) 3.5 - 5.0 g/dL    Alkaline Phosphatase 55 45 - 120 U/L    AST 26 0 - 40 U/L    ALT 22 0 - 45 U/L   HM1 (CBC with Diff)   Result Value Ref Range    WBC 4.4 4.0 - 11.0 thou/uL    RBC 2.67 (L) 4.40 - 6.20 mill/uL    Hemoglobin 8.4 (L) 14.0 - 18.0 g/dL    Hematocrit 26.4 (L) 40.0 - 54.0 %    MCV 99 80 - 100 fL    MCH 31.5 27.0 - 34.0 pg    MCHC 31.8 (L) 32.0 - 36.0 g/dL    RDW 13.2 11.0 - 14.5 %    Platelets 266 140 - 440 thou/uL    MPV 9.9 8.5 - 12.5 fL    Neutrophils % 56 50 - 70 %    Lymphocytes % 24 20 - 40 %    Monocytes % 10 2 - 10 %    Eosinophils % 7 (H) 0 - 6 %    Basophils % 1 0 - 2 %    Immature Granulocyte % 2 (H) <=0 %    Neutrophils Absolute 2.5 2.0 - 7.7 thou/uL    Lymphocytes Absolute 1.1 0.8 - 4.4 thou/uL    Monocytes Absolute 0.5 0.0 - 0.9 thou/uL    Eosinophils Absolute 0.3 0.0 - 0.4 thou/uL    Basophils Absolute 0.0 0.0 - 0.2 thou/uL    Immature Granulocyte Absolute 0.1 (H) <=0.0 thou/uL   INR   Result Value Ref Range    INR 2.08 (H) 0.90 - 1.10     Results for orders placed or performed during the hospital encounter of 10/30/19   Basic Metabolic Panel   Result Value Ref Range    Sodium 144 136 - 145 mmol/L    Potassium 4.6 3.5 - 5.0 mmol/L    Chloride 111 (H) 98 - 107  mmol/L    CO2 22 22 - 31 mmol/L    Anion Gap, Calculation 11 5 - 18 mmol/L    Glucose 145 (H) 70 - 125 mg/dL    Calcium 10.6 (H) 8.5 - 10.5 mg/dL    BUN 38 (H) 8 - 28 mg/dL    Creatinine 1.24 0.70 - 1.30 mg/dL    GFR MDRD Af Amer >60 >60 mL/min/1.73m2    GFR MDRD Non Af Amer 56 (L) >60 mL/min/1.73m2         Lab Results   Component Value Date    WBC 4.4 08/24/2020    HGB 8.4 (L) 08/24/2020    HCT 26.4 (L) 08/24/2020    MCV 99 08/24/2020     08/24/2020       Lab Results   Component Value Date    WIPXSTHN00 528 02/24/2015     No results found for: HGBA1C  Lab Results   Component Value Date    INR 2.08 (H) 08/27/2020    INR 2.22 (H) 08/24/2020    INR 2.17 (H) 08/20/2020     Vitamin D, Total (25-Hydroxy)   Date Value Ref Range Status   10/01/2019 50.1 30.0 - 80.0 ng/mL Final     Lab Results   Component Value Date    TSH 1.15 08/13/2020           ASSESSMENT/PLAN:    Lower GI Bleed, melena: Colonoscopy 8/14-large transverse polyp, sigmoid diverticulum, large hemorrhoids. Aspirin stopped, remains on warfarin. Still needs F/u  With GI. No n/v/abdominal pain. Tolerating diet. Hg 8.5 on 8/20, dropped from 9 in hospital.  hemoccult stool x 3-not been notified of positive results so assuming negative. Recheck on 8/24-hg remained at 8.5. No further signs of bleeding, denies stomach upset, nausea, pain, diarrhea, melena. Change iron to daily, recheck hm1 on 9/1. Needs follow up with GI.  Acute blood loss anemia: hg 12.3-9.3 during hospital course, hg 9 on 8/16.  Repeat on 8/24-hg 8.5.  Change iron to daily, hm1 on 9/1.   Atrial fibrillation: rate controlled in 60s. On warfarin, inr today. On metoprolol, added hold parameters.   HTN: SBP 110s. On metoprolol,  hold parameter. Encourage fluids. Asymptomatic.   CKD stage 3: cmp ordered for 8/24-cr 0.79 on 8/24.   NORMA:  bring CPAP from home and use at bedtime.   HLD: on pravastatin. Aspirin stopped.   Constipation: miralax daily prn. No concerns.   Chronic back pain: on lidoderm  patch, tylenol prn. Reports pain controlled today.   Protein calorie malnutrition: daily weights, dietary following. Concerns for failure to thrive at home, frequent falls. 118-122lbs.  Frequent Falls: discussed with nursing social service consult, fall prevention protocol. PT, OT following. No recent falls. Working with therapy.        Electronically signed by: Francheska Messina NP

## 2021-06-10 NOTE — TELEPHONE ENCOUNTER
ANTICOAGULATION  MANAGEMENT    Assessment     Today's INR result of 3.5 is Supratherapeutic (goal INR of 2.0-3.0)        Warfarin recently held as instructed which may be affecting INR    Not eating per normal may be affecting diet and INR    No new medication/supplements affecting INR    Continues to tolerate warfarin with no reported s/s of bleeding or thromboembolism     Previous INR was Supratherapeutic    Plan:     Spoke on phone with spouse Marisabel regarding INR result and instructed:     Warfarin Dosing Instructions:  Hold dose today then change warfarin dose to    1.25 mg every Mon, Wed, Fri; 2.5 mg all other days       (15 % change = 8 % from total received in the past week)    Instructed patient to follow up no later than: 7-10 days appointment made.    Education provided: impact of vitamin K foods on INR, importance of therapeutic range and target INR goal and significance of current INR result    Marisabel verbalizes understanding and agrees to warfarin dosing plan.    Instructed to call the [facility] for any changes, questions or concerns. (#[phone])   ?   Sharonda Dumas RN    Subjective/Objective:      Antonio White, a 85 y.o. male is on warfarin.     Antonio reports:     Home warfarin dose: verbally confirmed home dose with Marisabel and updated on anticoagulation calendar     Missed doses: Yes: as instructed on 7/29     Medication changes:  No     S/S of bleeding or thromboembolism:  No     New Injury or illness:  No     Changes in diet or alcohol consumption:  Yes: not eating per normal.     Upcoming surgery, procedure or cardioversion:  No    Anticoagulation Episode Summary     Current INR goal:   2.0-3.0   TTR:   68.8 % (1 y)   Next INR check:   8/14/2020   INR from last check:   3.50! (8/5/2020)   Weekly max warfarin dose:      Target end date:      INR check location:      Preferred lab:      Send INR reminders to:   [facility]    Indications    Atrial fibrillation (H)  [I48.91]           Comments:   chronic           Anticoagulation Care Providers     Provider Role Specialty Phone number    Joey Garcia MD Referring Cardiology [phone]

## 2021-06-11 LAB — INR PPP: 1.9 (ref 0.9–1.1)

## 2021-06-11 NOTE — TELEPHONE ENCOUNTER
Wellness Screening Tool  Symptom Screening:  Do you have one of the following NEW symptoms:    Fever (subjective or >100.0)?  No    A new cough?  No    Shortness of breath?  No     Chills? No     New loss of taste or smell? No     Generalized body aches? No     New persistent headache? No     New sore throat? No     Nausea, vomiting, or diarrhea?  No    Within the past 2 weeks, have you been exposed to someone with a known positive illness below:    COVID-19 (known or suspected)?  Yes    Chicken pox?  No    Mealses?  No    Pertussis?  No    Patient notified of visitor policy- They may have one person accompany them to their appointment, but they will need to wear a mask and will be screened upon arrival for symptoms: Yes  Pt informed to wear a mask: Yes  Pt notified if they develop any symptoms listed above, prior to their appointment, they are to call the clinic directly at [phone] for further instructions.  Yes  Patient's appointment status: Patient will be seen in clinic as scheduled on 9/17

## 2021-06-11 NOTE — TELEPHONE ENCOUNTER
ANTICOAGULATION  MANAGEMENT- Home Care/Care Facility Result    Assessment     Today's INR result of 2.6 is Therapeutic (goal INR of 2.0-3.0)        Noted that the patient has been taking 2.5 mg daily since 8/31 while he was still at TCU.    No new diet changes affecting INR    No new medication/supplements affecting INR    Continues to tolerate warfarin with no reported s/s of bleeding or thromboembolism     Previous INR was Subtherapeutic     Per Molly the patient is currently at [facility] - she stated that Home Care is only checking INR but staff at the Assisted Living is doing medication administration. She is requesting for the encounter faxed to     Plan:     Spoke with Home Care Nurse Molly discussed INR result and instructed:     Warfarin Dosing Instructions: Continue current warfarin dose 2.5 mg daily  (0 % change)    Next INR to be drawn: one week    Education provided: importance of therapeutic range    Molly verbalizes understanding and agrees to warfarin dosing plan.   ?   Sharonda Dumas RN    Subjective/Objective:      Antonio White, a 85 y.o. male is established on warfarin.     Home care/care facility RN's report of Antonio INR, recent warfarin dosing, diet changes, medication changes, and symptoms is documented below.    Additional findings: see above    Anticoagulation Episode Summary     Current INR goal:   2.0-3.0   TTR:   72.4 % (11.9 mo)   Next INR check:   9/22/2020   INR from last check:   2.60 (9/15/2020)   Weekly max warfarin dose:      Target end date:      INR check location:      Preferred lab:      Send INR reminders to:   [facility]    Indications    Chronic atrial fibrillation (H) [I48.20]           Comments:   chronic           Anticoagulation Care Providers     Provider Role Specialty Phone number    Joey Garcia MD Referring Cardiology [phone]

## 2021-06-11 NOTE — PROGRESS NOTES
INR 3.3 will decrease dose to 5 mg Sunday and then 2.5 mg all other days. Retest in 2 weeks. After talking with pt and discussing history of greens/salads and medication change. Pt will  continue  with current diet and dosing of Warfarin.  Continue with moderation of Vit K and green leafy vegetables. Cautioned to call with increase bruising or bleeding. Reminded to call with medication change especially antibiotic. Call with any questions or concerns or any up coming procedures. Cautioned about using Herbal medication.

## 2021-06-11 NOTE — PROGRESS NOTES
[facility]    [id number]   Code Status: FULL    CHIEF COMPLAINT/REASON FOR VISIT:  Chief Complaint   Patient presents with     Problem Visit     F/U pain        HISTORY:      HPI: Antonio is a 85 y.o. male undergoing physical and occupational therapy at [facility]. He is with past medical history  of atrial fibrillation on Coumadin, CKD 3, NORMA, hyperlipidemia, hypertension.  He presented to the ER on 8/12 for evaluation of confusion and recurrent falls. He was noted to have a lower GI bleed. He underwent a colonoscopy on 8/14 showing large transverse polyp, sigmoid diverticulum and large internal hemorroids, no active bleeding. His ASA was held and coumadin resumed. He was found to have a LLL infiltrate and received  Rocephin and azithromycin x 48hrs, stopped abx August 17, 2020  -procalcitonin normal  -covid-19 negative   His encephalopathy ws thought to be related to hospital acquired delirium and multifactorial.     Today He is seen to review pain and follow-up of labs.  Patient Tylenol was scheduled 2 days ago and he tells me he is given much better relief.  He denies any bleeding.  Hemoglobin monitor.  He is up to 9.2 from 8.4.  He denies fever, chills,Denies any chest pain,headaches,lightheadedness, dizziness,   shortness of breath, or cough. Appetite is fair. Denies any GERD symptoms.   Denies any difficulty with swallowing,Denies any abdominal pain, constipation or loose stools. No insomnia. No active bleeding.  His weights were reviewed and he is up 8.7 pounds in the last 15 days.  He does not have any lower extremity edema and his lung sounds were clear throughout all fields    Past Medical History:   Diagnosis Date     Anemia      Aortic regurgitation      Atrial fibrillation (H)      Cardiac murmur      Chronic kidney disease     stage 3     Coronary artery disease      GERD (gastroesophageal reflux disease)      Hearing  loss      History of prostate cancer      Hyperlipidemia      Hypertension      Malignant melanoma (H)     left chest     Malignant melanoma (H)      MI (myocardial infarction) (H) 2013    x1     Mitral valve prolapse      Osteoporosis      Personal history of thromboembolic disease 3/6/2016    History thromboembolic MI in 2013; continue indefinite anticoagulation unless risks outweight benefits.     Prostate cancer (H)     had radioactive seed treatment     Sleep apnea     no CPAP             Family History   Problem Relation Age of Onset     Heart disease Mother         hypertension     Hypertension Mother      Prostate cancer Father      Cancer Father         mouth and throat     Cancer Sister         kidney     Cancer Daughter         skin     Cancer Daughter         skin     Social History     Socioeconomic History     Marital status:      Spouse name: Not on file     Number of children: Not on file     Years of education: Not on file     Highest education level: Not on file   Occupational History     Not on file   Social Needs     Financial resource strain: Not on file     Food insecurity     Worry: Not on file     Inability: Not on file     Transportation needs     Medical: Not on file     Non-medical: Not on file   Tobacco Use     Smoking status: Never Smoker     Smokeless tobacco: Never Used   Substance and Sexual Activity     Alcohol use: Yes     Alcohol/week: 4.0 standard drinks     Types: 4 Glasses of wine per week     Comment: 3-4 glasses wine weekly     Drug use: No     Sexual activity: Not on file   Lifestyle     Physical activity     Days per week: Not on file     Minutes per session: Not on file     Stress: Not on file   Relationships     Social connections     Talks on phone: Not on file     Gets together: Not on file     Attends Shinto service: Not on file     Active member of club or organization: Not on file     Attends meetings of clubs or organizations: Not on file     Relationship  status: Not on file     Intimate partner violence     Fear of current or ex partner: Not on file     Emotionally abused: Not on file     Physically abused: Not on file     Forced sexual activity: Not on file   Other Topics Concern     Not on file   Social History Narrative     Not on file         Review of Systems   Constitutional: Negative for activity change, appetite change, chills, fatigue and fever.   HENT: Negative for congestion and sore throat.    Eyes: Negative for visual disturbance.   Respiratory: Negative for cough, shortness of breath and wheezing.    Cardiovascular: Negative for chest pain and leg swelling.   Gastrointestinal: Negative for abdominal distention, abdominal pain, constipation, diarrhea and nausea.   Genitourinary: Negative for dysuria.   Musculoskeletal: Positive for back pain. Negative for arthralgias and myalgias.   Skin: Negative for color change, rash and wound.   Neurological: Positive for weakness. Negative for dizziness and numbness.   Psychiatric/Behavioral: Negative for agitation, behavioral problems and sleep disturbance.       Vitals:    09/03/20 2043   BP: 110/67   Pulse: (!) 58   Resp: 18   Temp: 98  F (36.7  C)   SpO2: 96%   Weight: 127 lb 6.4 oz (57.8 kg)       Physical Exam  Constitutional:       Appearance: He is well-developed.      Comments: Pleasant gentleman in on acute distress    HENT:      Head: Normocephalic.   Eyes:      Conjunctiva/sclera: Conjunctivae normal.   Neck:      Musculoskeletal: Normal range of motion.   Cardiovascular:      Rate and Rhythm: Normal rate and regular rhythm.      Heart sounds: Normal heart sounds. No murmur.   Pulmonary:      Effort: No respiratory distress.      Breath sounds: Normal breath sounds. No wheezing or rales.   Abdominal:      General: Bowel sounds are normal. There is no distension.      Palpations: Abdomen is soft.      Tenderness: There is no abdominal tenderness.   Musculoskeletal: Normal range of motion.   Skin:      General: Skin is warm.   Neurological:      Mental Status: He is alert and oriented to person, place, and time.   Psychiatric:         Behavior: Behavior normal.           LABS:   Recent Results (from the past 240 hour(s))   INR   Result Value Ref Range    INR 2.08 (H) 0.90 - 1.10   INR   Result Value Ref Range    INR 1.68 (H) 0.90 - 1.10   HM1 (CBC with Diff)   Result Value Ref Range    WBC 5.7 4.0 - 11.0 thou/uL    RBC 3.02 (L) 4.40 - 6.20 mill/uL    Hemoglobin 9.2 (L) 14.0 - 18.0 g/dL    Hematocrit 29.6 (L) 40.0 - 54.0 %    MCV 98 80 - 100 fL    MCH 30.5 27.0 - 34.0 pg    MCHC 31.1 (L) 32.0 - 36.0 g/dL    RDW 13.4 11.0 - 14.5 %    Platelets 235 140 - 440 thou/uL    MPV 10.2 8.5 - 12.5 fL    Neutrophils % 67 50 - 70 %    Lymphocytes % 15 (L) 20 - 40 %    Monocytes % 11 (H) 2 - 10 %    Eosinophils % 6 0 - 6 %    Basophils % 1 0 - 2 %    Immature Granulocyte % 1 (H) <=0 %    Neutrophils Absolute 3.8 2.0 - 7.7 thou/uL    Lymphocytes Absolute 0.8 0.8 - 4.4 thou/uL    Monocytes Absolute 0.6 0.0 - 0.9 thou/uL    Eosinophils Absolute 0.4 0.0 - 0.4 thou/uL    Basophils Absolute 0.0 0.0 - 0.2 thou/uL    Immature Granulocyte Absolute 0.0 <=0.0 thou/uL     Current Outpatient Medications   Medication Sig     acetaminophen (TYLENOL) 500 MG tablet Take 1-2 tablets (500-1,000 mg total) by mouth every 4 (four) hours as needed. (Patient taking differently: Take 1,000 mg by mouth 3 (three) times a day. And daily PRN)     FERROUS SULFATE (SLOW FE ORAL) Take 45 mg by mouth daily.      leuprolide, 6 month, (LUPRON DEPOT, 6 MONTH,) 45 mg SyKt Inject 45 mg into the shoulder, thigh, or buttocks every 6 (six) months.     lidocaine 4 % patch Place 1 patch on the skin daily. Remove and discard patch with 12 hours or as directed by MD.     metoprolol tartrate (LOPRESSOR) 25 MG tablet Take 0.5 tablets (12.5 mg total) by mouth daily.     multivitamin therapeutic (THERAGRAN) tablet Take 1 tablet by mouth daily.     polyethylene glycol (MIRALAX)  17 gram packet Take 1 packet (17 g total) by mouth daily as needed.     pravastatin (PRAVACHOL) 80 MG tablet Take 1 tablet by mouth bedtime.     warfarin sodium (WARFARIN ORAL) Take by mouth. 8/31/20 INR 1.68  Take 2.5mg daily.  Next INR 9/8/20.    8/27/20 INR 2.08  Cont 1.25mg M-W-F and 2.5mg AOD.  Next INR 8/31/20.    8/24/20 INR 2.22 Cont 1.25mg MWF, 2.5mg AOD. Next INR 8/27.  8/20/20 INR 2.17 Cont 1.25mg MWF, 2.5mg AOD. Next INR 8/24 8/18/20 INR 1.82 1.25mg MWF, 2.5mg AOD.  1.25 mg every Mon, Wed, Fri; 2.5 mg all other days.  Adjust dose based on INR results as directed.     ASSESSMENT:      ICD-10-CM    1. Anemia, unspecified type  D64.9    2. Pain management  R52        PLAN:    Chronic atrial fibrillation on Coumadin, Monitor and adjust per INR;s Continue metoprolol tartrate    Acute blood loss anemia _ HGB improved to 9.2 from 8.4. On ferrous sulfate     Hypertension On metoprolol tartrate     Chronic low back pain -  ES Tylenol three times a day and daily PRN     Electronically signed by: Nimisha Maldonado CNP

## 2021-06-11 NOTE — TELEPHONE ENCOUNTER
INR result is 2.6 - not with the patient at the time of the call  INR   Date Value Ref Range Status   09/09/2020 1.99 (H) 0.90 - 1.10 Final       Will the patient be seen, or did they already see, MD or CNP today? No    Most Recent Warfarin dose day/week  Sunday Monday Tuesday Wednesday Thursday Friday Saturday     2.5 2.5 2.5 2.5 2.5     Sunday Monday Tuesday Wednesday Thursday Friday Saturday   2.5 2.5            Has the patient missed any doses of Coumadin, Warfarin, Jantoven in the past 7 days? No    Has the patients medications changed since the last visit? No    Has the patient experienced any bleeding recently? No    Has the patient experienced any injuries or illness recently? Yes had hospitalization in August has been in TCU    Has the patient experienced any 'new' shortness of breath, severe headaches, or changes in vision recently? No    Has the patient had any changes in their diet, or alcohol consumption? No    Is the patient here today to prepare for any type of upcoming surgery, procedure, or for a cardioversion procedure? No    What phone number can we reach the patient at today? home phone listed in demographics.

## 2021-06-11 NOTE — TELEPHONE ENCOUNTER
Who is calling:  Molly   Reason for Call:  Nurse with [facility] returning call to Sharonda Rg  Date of last appointment with primary care: n/a  Okay to leave a detailed message: Yes

## 2021-06-11 NOTE — TELEPHONE ENCOUNTER
[facility] Nurse Triage Anticoagulation Note      Provider: JUAN F Shaw  Facility: [facility]    Facility Type: TCU    Caller: Jin  Call Back Number:  [phone]    Reason for call: INR    Today s INR: 1.68  Previous INR: 8/27 2.08(1.25mg M-W-F and 2.5mg AOD), 8/24 2.22(1.25mg M-W-F and 2.5mg AOD).      Diagnosis/Goal: AFIB  Heparin/Lovenox: No   Currently on ABX: No  Other interacting Medications: None  Missed or refused doses: No    Verbal Order/Direction given by Provider: Warfarin 2.5mg daily.  Next INR 9/8/20.      Provider giving order: JUAN F Shaw    Verbal order given to: Jin Han RN

## 2021-06-11 NOTE — PROGRESS NOTES
INR 2.2 Continue current management dosing of Warfarin. Continue  diet of moderate Vitamin K intake. Discussed with pt the need to call with questions or concerns or any change in medication especially herbal medication or OTC. Call with increased bleeding or bruising or any upcoming procedures.

## 2021-06-11 NOTE — TELEPHONE ENCOUNTER
[facility] Nurse Triage Anticoagulation Note      Provider: JUAN F Shaw  Facility: [facility]    Facility Type: TCU    Caller: Daphnie  Call Back Number:  [phone]    Reason for call: INR    Today s INR: 1.99  Previous INR: 8/31 1.68(2.5mg daily)    Diagnosis/Goal: AFIB  Heparin/Lovenox: No   Currently on ABX: No  Other interacting Medications: None  Missed or refused doses: No    Verbal Order/Direction given by Provider: Continue Warfarin 2.5mg daily.  Next INR 9/15/20.      Provider giving order: JUAN F Shaw    Verbal order given to: Daphnie Han RN

## 2021-06-11 NOTE — PROGRESS NOTES
[facility]    Facility:   [facility] [[id number]]   Code Status: FULL    CHIEF COMPLAINT/REASON FOR VISIT:  Chief Complaint   Patient presents with     Review Of Multiple Medical Conditions     pain management, F/U GI bleed.        HISTORY:      HPI: Antonio is a 85 y.o. male undergoing physical and occupational therapy at [facility]. He is with past medical history  of atrial fibrillation on Coumadin, CKD 3, NORMA, hyperlipidemia, hypertension.  He presented to the ER on 8/12 for evaluation of confusion and recurrent falls. He was noted to have a lower GI bleed. He underwent a colonoscopy on 8/14 showing large transverse polyp, sigmoid diverticulum and large internal hemorroids, no active bleeding. His ASA was held and coumadin resumed. He was found to have a LLL infiltrate and received  Rocephin and azithromycin x 48hrs, stopped abx August 17, 2020  -procalcitonin normal  -covid-19 negative   His encephalopathy ws thought to be related to hospital acquired delirium and multifactorial.     Today He is seen to review multiple medical issues and to establish care,  Denies fever, chills,Denies any chest pain,headaches,lightheadedness, dizziness,   shortness of breath, or cough. Appetite is good. Denies any GERD symptoms.   Denies any difficulty with swallowing,Denies any abdominal pain, constipation or loose stools. No insomnia. No active bleeding. His concern today was chronic back pain which is relieved with Tylenol but he reports he forgets to ask for it. His HGB has improved today to 9.2 from 8.4 on 8/24/20.     Past Medical History:   Diagnosis Date     Anemia      Aortic regurgitation      Atrial fibrillation (H)      Cardiac murmur      Chronic kidney disease     stage 3     Coronary artery disease      GERD (gastroesophageal reflux disease)      Hearing loss      History of prostate cancer      Hyperlipidemia      Hypertension      Malignant melanoma (H)      left chest     Malignant melanoma (H)      MI (myocardial infarction) (H) 2013    x1     Mitral valve prolapse      Osteoporosis      Personal history of thromboembolic disease 3/6/2016    History thromboembolic MI in 2013; continue indefinite anticoagulation unless risks outweight benefits.     Prostate cancer (H)     had radioactive seed treatment     Sleep apnea     no CPAP             Family History   Problem Relation Age of Onset     Heart disease Mother         hypertension     Hypertension Mother      Prostate cancer Father      Cancer Father         mouth and throat     Cancer Sister         kidney     Cancer Daughter         skin     Cancer Daughter         skin     Social History     Socioeconomic History     Marital status:      Spouse name: Not on file     Number of children: Not on file     Years of education: Not on file     Highest education level: Not on file   Occupational History     Not on file   Social Needs     Financial resource strain: Not on file     Food insecurity     Worry: Not on file     Inability: Not on file     Transportation needs     Medical: Not on file     Non-medical: Not on file   Tobacco Use     Smoking status: Never Smoker     Smokeless tobacco: Never Used   Substance and Sexual Activity     Alcohol use: Yes     Alcohol/week: 4.0 standard drinks     Types: 4 Glasses of wine per week     Comment: 3-4 glasses wine weekly     Drug use: No     Sexual activity: Not on file   Lifestyle     Physical activity     Days per week: Not on file     Minutes per session: Not on file     Stress: Not on file   Relationships     Social connections     Talks on phone: Not on file     Gets together: Not on file     Attends Congregation service: Not on file     Active member of club or organization: Not on file     Attends meetings of clubs or organizations: Not on file     Relationship status: Not on file     Intimate partner violence     Fear of current or ex partner: Not on file      Emotionally abused: Not on file     Physically abused: Not on file     Forced sexual activity: Not on file   Other Topics Concern     Not on file   Social History Narrative     Not on file         Review of Systems   Constitutional: Negative for activity change, appetite change, chills, fatigue and fever.   HENT: Negative for congestion and sore throat.    Eyes: Negative for visual disturbance.   Respiratory: Negative for cough, shortness of breath and wheezing.    Cardiovascular: Negative for chest pain and leg swelling.   Gastrointestinal: Negative for abdominal distention, abdominal pain, constipation, diarrhea and nausea.   Genitourinary: Negative for dysuria.   Musculoskeletal: Positive for back pain. Negative for arthralgias and myalgias.   Skin: Negative for color change, rash and wound.   Neurological: Positive for weakness. Negative for dizziness and numbness.   Psychiatric/Behavioral: Negative for agitation, behavioral problems and sleep disturbance.       Vitals:    09/01/20 1045   BP: 129/81   Pulse: 71   Resp: 16   Temp: 97.1  F (36.2  C)   SpO2: 95%       Physical Exam  Constitutional:       Appearance: He is well-developed.      Comments: Pleasant gentleman in on acute distress    HENT:      Head: Normocephalic.   Eyes:      Conjunctiva/sclera: Conjunctivae normal.   Neck:      Musculoskeletal: Normal range of motion.   Cardiovascular:      Rate and Rhythm: Normal rate and regular rhythm.      Heart sounds: Normal heart sounds. No murmur.   Pulmonary:      Effort: No respiratory distress.      Breath sounds: Normal breath sounds. No wheezing or rales.   Abdominal:      General: Bowel sounds are normal. There is no distension.      Palpations: Abdomen is soft.      Tenderness: There is no abdominal tenderness.   Musculoskeletal: Normal range of motion.   Skin:     General: Skin is warm.   Neurological:      Mental Status: He is alert and oriented to person, place, and time.   Psychiatric:          Behavior: Behavior normal.           LABS:   Recent Results (from the past 240 hour(s))   INR   Result Value Ref Range    INR 2.22 (H) 0.90 - 1.10   Comprehensive Metabolic Panel   Result Value Ref Range    Sodium 141 136 - 145 mmol/L    Potassium 3.7 3.5 - 5.0 mmol/L    Chloride 112 (H) 98 - 107 mmol/L    CO2 21 (L) 22 - 31 mmol/L    Anion Gap, Calculation 8 5 - 18 mmol/L    Glucose 78 70 - 125 mg/dL    BUN 26 8 - 28 mg/dL    Creatinine 0.79 0.70 - 1.30 mg/dL    GFR MDRD Af Amer >60 >60 mL/min/1.73m2    GFR MDRD Non Af Amer >60 >60 mL/min/1.73m2    Bilirubin, Total 0.4 0.0 - 1.0 mg/dL    Calcium 10.3 8.5 - 10.5 mg/dL    Protein, Total 5.3 (L) 6.0 - 8.0 g/dL    Albumin 2.5 (L) 3.5 - 5.0 g/dL    Alkaline Phosphatase 55 45 - 120 U/L    AST 26 0 - 40 U/L    ALT 22 0 - 45 U/L   HM1 (CBC with Diff)   Result Value Ref Range    WBC 4.4 4.0 - 11.0 thou/uL    RBC 2.67 (L) 4.40 - 6.20 mill/uL    Hemoglobin 8.4 (L) 14.0 - 18.0 g/dL    Hematocrit 26.4 (L) 40.0 - 54.0 %    MCV 99 80 - 100 fL    MCH 31.5 27.0 - 34.0 pg    MCHC 31.8 (L) 32.0 - 36.0 g/dL    RDW 13.2 11.0 - 14.5 %    Platelets 266 140 - 440 thou/uL    MPV 9.9 8.5 - 12.5 fL    Neutrophils % 56 50 - 70 %    Lymphocytes % 24 20 - 40 %    Monocytes % 10 2 - 10 %    Eosinophils % 7 (H) 0 - 6 %    Basophils % 1 0 - 2 %    Immature Granulocyte % 2 (H) <=0 %    Neutrophils Absolute 2.5 2.0 - 7.7 thou/uL    Lymphocytes Absolute 1.1 0.8 - 4.4 thou/uL    Monocytes Absolute 0.5 0.0 - 0.9 thou/uL    Eosinophils Absolute 0.3 0.0 - 0.4 thou/uL    Basophils Absolute 0.0 0.0 - 0.2 thou/uL    Immature Granulocyte Absolute 0.1 (H) <=0.0 thou/uL   INR   Result Value Ref Range    INR 2.08 (H) 0.90 - 1.10   INR   Result Value Ref Range    INR 1.68 (H) 0.90 - 1.10   HM1 (CBC with Diff)   Result Value Ref Range    WBC 5.7 4.0 - 11.0 thou/uL    RBC 3.02 (L) 4.40 - 6.20 mill/uL    Hemoglobin 9.2 (L) 14.0 - 18.0 g/dL    Hematocrit 29.6 (L) 40.0 - 54.0 %    MCV 98 80 - 100 fL    MCH 30.5 27.0  - 34.0 pg    MCHC 31.1 (L) 32.0 - 36.0 g/dL    RDW 13.4 11.0 - 14.5 %    Platelets 235 140 - 440 thou/uL    MPV 10.2 8.5 - 12.5 fL    Neutrophils % 67 50 - 70 %    Lymphocytes % 15 (L) 20 - 40 %    Monocytes % 11 (H) 2 - 10 %    Eosinophils % 6 0 - 6 %    Basophils % 1 0 - 2 %    Immature Granulocyte % 1 (H) <=0 %    Neutrophils Absolute 3.8 2.0 - 7.7 thou/uL    Lymphocytes Absolute 0.8 0.8 - 4.4 thou/uL    Monocytes Absolute 0.6 0.0 - 0.9 thou/uL    Eosinophils Absolute 0.4 0.0 - 0.4 thou/uL    Basophils Absolute 0.0 0.0 - 0.2 thou/uL    Immature Granulocyte Absolute 0.0 <=0.0 thou/uL     Current Outpatient Medications   Medication Sig     acetaminophen (TYLENOL) 500 MG tablet Take 1-2 tablets (500-1,000 mg total) by mouth every 4 (four) hours as needed. (Patient taking differently: Take 1,000 mg by mouth every 6 (six) hours as needed. )     FERROUS SULFATE (SLOW FE ORAL) Take 45 mg by mouth daily.      leuprolide, 6 month, (LUPRON DEPOT, 6 MONTH,) 45 mg SyKt Inject 45 mg into the shoulder, thigh, or buttocks every 6 (six) months.     lidocaine 4 % patch Place 1 patch on the skin daily. Remove and discard patch with 12 hours or as directed by MD.     metoprolol tartrate (LOPRESSOR) 25 MG tablet Take 0.5 tablets (12.5 mg total) by mouth daily.     multivitamin therapeutic (THERAGRAN) tablet Take 1 tablet by mouth daily.     polyethylene glycol (MIRALAX) 17 gram packet Take 1 packet (17 g total) by mouth daily as needed.     pravastatin (PRAVACHOL) 80 MG tablet Take 1 tablet by mouth bedtime.     warfarin sodium (WARFARIN ORAL) Take by mouth. 8/31/20 INR 1.68  Take 2.5mg daily.  Next INR 9/8/20.    8/27/20 INR 2.08  Cont 1.25mg M-W-F and 2.5mg AOD.  Next INR 8/31/20.    8/24/20 INR 2.22 Cont 1.25mg MWF, 2.5mg AOD. Next INR 8/27.  8/20/20 INR 2.17 Cont 1.25mg MWF, 2.5mg AOD. Next INR 8/24 8/18/20 INR 1.82 1.25mg MWF, 2.5mg AOD.  1.25 mg every Mon, Wed, Fri; 2.5 mg all other days.  Adjust dose based on INR results as  directed.     ASSESSMENT:      ICD-10-CM    1. Chronic atrial fibrillation (H)  I48.20    2. Acute blood loss anemia  D62    3. Benign Essential Hypertension  I10    4. Pain management  R52        PLAN:    Chronic atrial fibrillation on Coumadin, Monitor and adjust per INR;s Continue metoprolol tartrate    Acute blood loss anemia _ HGB improved to 9.2 from 8.4. On ferrous sulfate     Hypertension On metoprolol tartrate     Chronic low back pain - Schedule ES Tylenol three times a day and daily PRN     Electronically signed by: Nimisha Maldonado CNP

## 2021-06-11 NOTE — TELEPHONE ENCOUNTER
"FYI - Status Update  Who is Calling: Suzanne Delcid WISErg  Update: Requests a call back from Stacy MAYBERRY to again review dosing. What she has written down is a bit different from what the facility has written down. Caller requests call back before \"end of day\" to ensure correct dosing for patient.  Okay to leave a detailed message?:  Yes    "

## 2021-06-11 NOTE — TELEPHONE ENCOUNTER
ANTICOAGULATION  MANAGEMENT- Home Care/Care Facility Result    Assessment     Today's INR result of 2.8 is Therapeutic (goal INR of 2.0-3.0)        Warfarin taken as previously instructed    No new diet changes affecting INR    No new medication/supplements affecting INR    Continues to tolerate warfarin with no reported s/s of bleeding or thromboembolism     Previous INR was Therapeutic     Faxed to [phone]    Plan:     Spoke with home care nurse  Colleendiscussed INR result and instructed:     Warfarin Dosing Instructions: Continue current warfarin dose 2.5 mg daily     Next INR to be drawn: two weeks    Suzanne verbalizes understanding and agrees to warfarin dosing plan.   ?   Stacy Chahal RN    Subjective/Objective:      Antonio White, a 85 y.o. male is established on warfarin.     Home care/care facility RN's report of Antonio INR, recent warfarin dosing, diet changes, medication changes, and symptoms is documented below.    Additional findings: none    Anticoagulation Episode Summary     Current INR goal:   2.0-3.0   TTR:   72.4 % (11.9 mo)   Next INR check:   10/6/2020   INR from last check:   2.80 (9/22/2020)   Weekly max warfarin dose:      Target end date:      INR check location:      Preferred lab:      Send INR reminders to:   [facility]    Indications    Chronic atrial fibrillation (H) [I48.20]           Comments:   chronic           Anticoagulation Care Providers     Provider Role Specialty Phone number    Joey Garcia MD Referring Cardiology [phone]

## 2021-06-11 NOTE — TELEPHONE ENCOUNTER
[facility] Nurse Triage Telephone Note      Provider: JUAN F Shaw  Facility: [facility]    Facility Type: TCU    Caller: Gabbi   Call Back Number:  [phone]    Allergies: Levaquin [levofloxacin]    Reason for call: Was due for INR draw today, but it wasn't ordered. Most recent INR 8/31 INR 1.68 on 2.5mg daily. Not on any antibiotics.    Verbal Order/Direction given by Provider: Give 2.5mg today, check INR in am 9/9.    Provider giving order: JUAN F Shaw    Verbal order given to: Gabbi Fine RN

## 2021-06-11 NOTE — TELEPHONE ENCOUNTER
INR result is 2.8  INR   Date Value Ref Range Status   09/15/2020 2.60 (!) 0.9 - 1.1 Final       Will the patient be seen, or did they already see, MD or CNP today? No    Most Recent Warfarin dose day/week  Sunday Monday Tuesday Wednesday Thursday Friday Saturday         2.5     Sunday Monday Tuesday Wednesday Thursday Friday Saturday              Nurse didn't have dosing    Has the patient missed any doses of Coumadin, Warfarin, Jantoven in the past 7 days? No    Has the patients medications changed since the last visit? No    Has the patient experienced any bleeding recently? Streak of blood in stool per patient    Has the patient experienced any injuries or illness recently? No    Has the patient experienced any 'new' shortness of breath, severe headaches, or changes in vision recently? No    Has the patient had any changes in their diet, or alcohol consumption? No    Is the patient here today to prepare for any type of upcoming surgery, procedure, or for a cardioversion procedure? No    What phone number can we reach the patient at today? home phone listed in demographics.

## 2021-06-12 NOTE — PROGRESS NOTES
[facility] Daily Progress     Patient Name: Antonio White  Date: 8/30/2017  Visit #: 5/8-10 visits - Medicare  PTA visit #:    Referral Diagnosis: Gait instability   Referring provider: Kyrie Lucero MD  Visit Diagnosis:     ICD-10-CM    1. Gait instability R26.81    2. Poor balance R26.89    3. Decreased functional mobility and endurance Z74.09          Assessment:   Patient returns to PT today with c/o increased L sided hip pain, onset yesterday evening. HEP was reviewed today and pt had no pain with current exercises, but suggested patient can discontinue exercises until next appt if soreness persists. Patient was tender to palpation over inferior/lateral greater trochanter on L which may be due to overuse and weakness of hip abd mm. Pt is appropriate for continued PT services.     Goal Status:  Pt. will be independent with home exercise program in : 2 weeks  Pt will: perform 4 square step test with SPC without dowel strikes in 20 seconds or less to improve dynamic balance in 8 weeks  Pt will: improve comfortable gait speed to 0.90 m/s or greater without SPC to improve gait and decrease falls risk in 8 weeks  Pt will: perform TUG with SPC in 15 seconds or less to decrease falls risk in 8 weeks  Pt will: improve FGA score to 18/30 or greater to decrease falls risk in 8 weeks    Plan / Patient Education:     Continue with initial plan of care.  Progress with home program as tolerated.    Subjective:   Patient reports feeling increased pain in his left hip today- he is unsure why. Pain started last night, worse with transitions such as sit<>stand. Exercises are slightly painful.   He did not notice any increased pain on Monday after appointment and has not started any new activities.     Pain Rating: none     Objective:     Gait observation: slightly decreased L step length today during ambulation, non-antalgic    + slight pain at L greater trochanter at posterior and lateral area    Treatment  Today     TREATMENT MINUTES COMMENTS   Evaluation     Self-care/ Home management     Manual therapy     Neuromuscular Re-education 15   Forward steps over dowel: alternating without UE support, x 20 reps x 2 sets with CGA from therapist and using single UE support. No LOB.    Lateral steps over dowel: alternating x 20 reps x 2 sets with B UE support.     Standing heel/toe rocking x 60 seconds x 2 reps B with single UE support. Added arm swing x 20 reps x 3 sets bilaterally. Used mirror for visual cues to swing arms forward, rather than with rotation.        Therapeutic Activity     Therapeutic Exercises 40 Nu-step WL4, 8:00 total, average SPM: 77     Reviewed and performed:  Exercises:  Exercise #1: Piriformis stretch and hip flexor stretch with a bed sheet in supine   Comment #1: HEP x 30-45 seconds   Exercise #2: Bridges  Comment #2: HEP x 10 reps  Exercise #3: Clamshell  Comment #3: HEP to fatigue B  Exercise #4: Hamstring stretch  Comment #4: HEP x 30-45 seconds        Discussed temporarily discontinuing hip flexor stretch, as pt was performing incorrectly at home. Suggested it is OK for patient to discontinue exercises altogether for a few days if his left hip is sore.  No pain reported with any exercises listed above.     Gait training     Modality__________________                Total 55    Blank areas are intentional and mean the treatment did not include these items.       Milli Can  8/30/2017

## 2021-06-12 NOTE — TELEPHONE ENCOUNTER
ANTICOAGULATION  MANAGEMENT- Home Care/Care Facility Result    Assessment     Today's INR result of 2.9 is Therapeutic (goal INR of 2.0-3.0)        Warfarin taken as previously instructed    No new diet changes affecting INR    No new medication/supplements affecting INR    Continues to tolerate warfarin with no reported s/s of bleeding or thromboembolism     Previous INR was Therapeutic     Will fax encounter to [facility] @ [phone] due to staff does patient's medications.    Plan:     Spoke with Home Care Nurse Davi discussed INR result and instructed:     Warfarin Dosing Instructions: Continue current warfarin dose    5 mg every Wed; 2.5 mg all other days      (0 % change)    Next INR to be drawn: one week      Education provided: importance of therapeutic range    Davi verbalizes understanding and agrees to warfarin dosing plan.   ?   Sharonda Dumas RN    Subjective/Objective:      Antonio White, a 85 y.o. male is established on warfarin.     Home care/care facility RN's report of Antonio INR, recent warfarin dosing, diet changes, medication changes, and symptoms is documented below.    Additional findings: none    Anticoagulation Episode Summary     Current INR goal:  2.0-3.0   TTR:  66.6 % (11.9 mo)   Next INR check:  11/10/2020   INR from last check:  2.90 (11/3/2020)   Weekly max warfarin dose:     Target end date:     INR check location:     Preferred lab:     Send INR reminders to:  [facility]    Indications    Chronic atrial fibrillation (H) [I48.20]           Comments:  chronic           Anticoagulation Care Providers     Provider Role Specialty Phone number    Joey Garcia MD Referring Cardiology [phone]

## 2021-06-12 NOTE — PROGRESS NOTES
[facility] Certification Request    August 9, 2017      Patient: Antonio White  MR Number: [id number]  YOB: 1935  Date of Visit: 8/9/2017      Dear Dr. Kyrie Lucero,     Thank you for this referral.   We are seeing Antonio White for Physical Therapy for gait instability.    Medicare and/or Medicaid requires physician review and approval of the treatment plan. Please review the plan of care and verify that you agree with the therapy plan of care by co-signing this note.      Plan of Care  Authorization / Certification Start Date: 08/09/17  Authorization / Certification End Date: 08/18/17  Authorization / Certification Number of Visits: up to 10 visits (including evaluation)  Communication with: Referral Source;Patient Caregiver  Patient Related Instruction: Nature of Condition;Treatment plan and rationale;Self Care instruction;Basis of treatment;Body mechanics;Other;Expected outcome;Next steps  Times per Week: 2x/week 60' appts  Number of Weeks: up to 8 weeks  Number of Visits: up to 10 visits   Therapeutic Exercise: ROM;Stretching;Strengthening  Neuromuscular Reeducation: kinesio tape;posture;TNE;balance/proprioception;core  Manual Therapy: soft tissue mobilization;joint mobilization;myofascial release  Other Plan #1: Therapeutic Activity     Goals:  Pt. will be independent with home exercise program in : 2 weeks  Pt will: perform 4 square step test with SPC without dowel strikes in 20 seconds or less to improve dynamic balance in 8 weeks  Pt will: improve comfortable gait speed to 0.90 m/s or greater without SPC to improve gait and decrease falls risk in 8 weeks  Pt will: perform TUG with SPC in 15 seconds or less to decrease falls risk in 8 weeks  Pt will: improve FGA score to 18/30 or greater to decrease falls risk in 8 weeks      If you have any questions or concerns, please don't hesitate to call.    Sincerely,      Milli Can, PT        Physician recommendation:     ___ Follow  therapist's recommendation        ___ Modify therapy      *Physician co-signature indicates they certify the need for these services furnished within this plan and while under their care.      [facility]   Balance Initial Evaluation    Patient Name: Antonio White  Date of evaluation: 8/9/2017  Referral Diagnosis: Unsteady gait   Referring provider: Kyrie Lucero MD  Visit Diagnosis:     ICD-10-CM    1. Gait instability R26.81    2. Poor balance R26.89    3. Decreased functional mobility and endurance Z74.09        Assessment:   Antonio White is a 82 y.o. male who presents to therapy today with chief complaints of declining balance and gait instability. Patient fell in 2016 and fractured his left hip. He received a TKA and physical therapy following this. Pt fell again in 1/2017 on his left hip without injury. Patient feels he has not returned to his PLF before his fracture and notices more difficulty with transferring in/out of the car and up from a chair. Patient does ambulate with gait deviation including widened CHRIST and shortened step length. His LE strength was fair with 8 repetitions as captured by the 30 second sit<>stand test. Patient is considered a high falls risk based on his FGA score of 12/30, comfortable gait speed of 0.76 m/s, and TUG 18.54 seconds. Pt also scored below age gender normative values for the tests listed above. Patient will benefit from 1:1 skilled PT services to improve gait, decrease falls risk, and maximize independence in ADLs.     Goals:  Pt. will be independent with home exercise program in : 2 weeks  Pt will: perform 4 square step test with SPC without dowel strikes in 20 seconds or less to improve dynamic balance in 8 weeks  Pt will: improve comfortable gait speed to 0.90 m/s or greater without SPC to improve gait and decrease falls risk in 8 weeks  Pt will: perform TUG with SPC in 15 seconds or less to decrease falls risk in 8 weeks  Pt will: improve FGA score to  "18/30 or greater to decrease falls risk in 8 weeks    Patient's expectations/goals are realistic.    Barriers to Learning or Achieving Goals:  No Barriers.       Plan / Patient Instructions:        Plan of Care:   Authorization / Certification Start Date: 08/09/17  Authorization / Certification End Date: 08/18/17  Authorization / Certification Number of Visits: up to 10 visits (including evaluation)  Communication with: Referral Source;Patient Caregiver  Patient Related Instruction: Nature of Condition;Treatment plan and rationale;Self Care instruction;Basis of treatment;Body mechanics;Other;Expected outcome;Next steps  Times per Week: 2x/week 60' appts  Number of Weeks: up to 8 weeks  Number of Visits: up to 10 visits   Therapeutic Exercise: ROM;Stretching;Strengthening  Neuromuscular Reeducation: kinesio tape;posture;TNE;balance/proprioception;core  Manual Therapy: soft tissue mobilization;joint mobilization;myofascial release  Other Plan #1: Therapeutic Activity     Plan for next visit: initiate HEP (strengthening, balance), gait drills, 2 minute walk test      Subjective:        Antonio White is an 81 y/o male who presents today with chief c/o declining balance and gait. Patient fell in 2016 and fractured his left hip- he received a TKA and physical therapy following this. Pt fell again in 1/2017 on his left hip without injury other than a hip contusion. Patient recalls he may have lost consciousness and is unsure why he fell. Patient notices he has not returned to PLF previous to his hip fracture. Patient feels it has become more difficult to transfer  in/out of the car, up from a chair, in/out of bed.  Pt and his wife notice he \"waddles\" when he walks. He also notices he cannot walk long distances.  Current activity level: pt exercises for 10 minutes on a treadmill up to 3 days/week.    Diagnosis: Gait instability   Mechanism of Injury: L hip fracture and CHRIS in 2016  Past medical history/precautions: CAD, kidney " "disease stage III, A-fib, HTN, osteoporosis, anemia, Hx of L hip fracture   Living Situation: Pt lives with his wife, he does assist her occasionally with ADL's. Patient's wife helps patient keep track of his medications. Pt lives in a 3 level home, 1 step to enter. Bedroom on top floor, 1 HR on stairs. One grab bar in the bathroom in the shower.   Caregiver Support: Pt has a daughter in [place] (daughter is a OT), 2 daughters out of state.   Equipment: Pt uses a shower chair.   Prior Functional Status:   Current Activity Level: active with some exercise as above  Chief Complaint: poor balance, notices difficulty on uneven surfaces, unable to walk long distances    Patient's Functional Goal: improve gait \"waddle\", improve walking distance/endurance    Fall history: no falls since 2017    Pain  Pain Ratin no hip pain, slight posterior neck pain   Pain description: aching and sharp         Objective:      Note: Items left blank indicates the item was not performed or not indicated at the time of the evaluation.    Balance Examination  1. Gait instability     2. Poor balance     3. Decreased functional mobility and endurance       Involved side: Bilateral    Posture Observation: forward head, rounded shoulders, PPT  Assistive Device: SPC since 2016    Transitional movements:          Sit?Stand:  Independent     W/C?Mat/Bed: Not Tested  Sit? Supine:  Independent   Supine? Sit:   Independent   W/C? Car:  Not Tested    Floor? Stand:  Not Tested    Strength    Strength   L / R ROM   L / R Comments   Seated Hip Flexion 4+/4+      Knee Extension 4+/4+      Dorsi Flexion 4+/4+                   Supine Straight Leg Raise (degrees)       Quad activation             Side lying Hip Abduction       Hip Adduction             Prone Hip Extension       Knee Flexion             Standing Plantar Flexion*       Dorsi Flexion        *Standing PF (single heel raise with UE support for balance only):  5= 16-20 heel raises  4= 10-15 heel " "raises  3= 5-9 heel raises  2= 1-4 heel raises    Timed Up and Go (TUG):   Average: 18.54 seconds  AD used? Yes, [facility]    TUG Cognitive: 19.85 seconds  Task: counting down from 100 by 3's (pt forgets to sit x2, even with reminder) 45.6 seconds  Task #2: counting months of the year backwards out loud, pt is correct 19.85 seconds      30 second sit<>stand: 8 reps  UE support? no  Age gender norm: 14    Balance:    Firm Surface (seconds) Unstable Surface (seconds)    EO EC EO EC   Romberg (feet together)       Sharpened Romberg (tandem)       Semi-Romberg (small step)           Other Balance Test: (Gupta, Tinettti, FGA, Mini-BESTest)      Functional Gait Assessment:     Gait Level surface: (2)   Change in Gait Speed: (1) lateral deviation, some imbalance with change    Gait with horizontal head turns: (1) lateral deviation    Gait with vertical head turns: (2) imbalance   Gait and Pivot Turn: (2) slow, safe   Step Over Obstacle: (1) slow, nearly stops, slight circumduction    Gait with Narrow Base of Support: (0)   Gait with Eyes Closed: (1)   Ambulating Backwards: (1)   Steps: (1)    Total Score: 12/30      Four Square Step Test (motor planning): Trial 1:23.03 seconds   (>15 seconds falls risk for best of 2 trials) dowel strike x 5 total, using [facility] [place]    Gait speed (10 meter walk test):  Comfortable gait speed: 7.88 seconds, 0.76 m/s  Age gender norm: 1.02 m/s  AD used? no    Fast gait speed: 5.53 seconds, 1.085 m/s  AD used? no    Gait observation:   Decreased step length with widened CHRIST, \"waddle\", arm swing present, pt upright posture with minimal trunk rotation            Treatment Today     TREATMENT MINUTES COMMENTS   Evaluation 55 Balance evaluation   Educated on POC and patient's scores from today's eval.    Self-care/ Home management     Manual therapy     Neuromuscular Re-education     Therapeutic Activity     Therapeutic Exercises     Gait training     Modality__________________                Total 55    Blank " areas are intentional and mean the treatment did not include these items.       PT Evaluation Code: (Please list factors)  Patient History/Comorbidities: CAD, kidney disease stage III, A-fib, HTN, osteoporosis, anemia, Hx of L hip fracture   Examination: see above  Clinical Presentation: stable  Clinical Decision Making: low    Patient History/  Comorbidities Examination  (body structures and functions, activity limitations, and/or participation restrictions) Clinical Presentation Clinical Decision Making (Complexity)   No documented Comorbidities or personal factors 1-2 Elements Stable and/or uncomplicated Low   1-2 documented comorbidities or personal factor 3 Elements Evolving clinical presentation with changing characteristics Moderate   3-4 documented comorbidities or personal factors 4 or more Unstable and unpredictable High              Milli Hugoe  8/9/2017  1:34 PM

## 2021-06-12 NOTE — TELEPHONE ENCOUNTER
Who is calling:  [facility]til[facility]  Reason for Call:  Otilio is requesting INR orders be faxed to them at [phone] as soon as possible.    Okay to leave a detailed message: No call back is needed

## 2021-06-12 NOTE — PROGRESS NOTES
INR 1.8 increase dose today 5 mg then 5 mg Sat and 2.5 mg all other days. Continue current management dosing of Warfarin. Continue  diet of moderate Vitamin K intake. Discussed with pt the need to call with questions or concerns or any change in medication especially herbal medication or OTC. Call with increased bleeding or bruising or any upcoming procedures.

## 2021-06-12 NOTE — TELEPHONE ENCOUNTER
INR result is 1.6   INR   Date Value Ref Range Status   10/13/2020 1.20 (!) 0.9 - 1.1 Final       Will the patient be seen, or did they already see, MD or CNP today? No    Most Recent Warfarin dose day/week  Sunday Monday Tuesday Wednesday Thursday Friday Saturday     2.5 5 2.5 2.5 2.5     Sunday Monday Tuesday Wednesday Thursday Friday Saturday   2.5 2.5            Has the patient missed any doses of Coumadin, Warfarin, Jantoven in the past 7 days? No    Has the patients medications changed since the last visit? No    Has the patient experienced any bleeding recently? No    Has the patient experienced any injuries or illness recently? No    Has the patient experienced any 'new' shortness of breath, severe headaches, or changes in vision recently? No    Has the patient had any changes in their diet, or alcohol consumption? No    Is the patient here today to prepare for any type of upcoming surgery, procedure, or for a cardioversion procedure? No    What phone number can we reach the patient at today? Otilio

## 2021-06-12 NOTE — PROGRESS NOTES
2.3 INR stable. Discussed continuing management of dose of Warfarin and returning in one month . No changes to diet needed at this time. Continue moderate intake of Vitamin K and call if increase bruising or unexplained bleeding. Call with medication changes or upcoming procedures.

## 2021-06-12 NOTE — PROGRESS NOTES
[facility] Daily Progress     Patient Name: Antonio White  Date: 9/7/2017  Visit #: 7/8-10 visits - Medicare  PTA visit #:    Referral Diagnosis: Gait instability   Referring provider: Kyrie Lucero MD  Visit Diagnosis:     ICD-10-CM    1. Poor balance R26.89    2. Gait instability R26.81    3. Decreased functional mobility and endurance Z74.09    4. Hip stiffness, left M25.652          Assessment:   Patient consistently reports no hip pain. He tolerated quadruped exercises without pain although does still present with L >R sided gluteal weakness and impaired hip extension. Pt is appropriate for continued PT services.     Goal Status:  Pt. will be independent with home exercise program in : 2 weeks  Pt will: perform 4 square step test with SPC without dowel strikes in 20 seconds or less to improve dynamic balance in 8 weeks  Pt will: improve comfortable gait speed to 0.90 m/s or greater without SPC to improve gait and decrease falls risk in 8 weeks  Pt will: perform TUG with SPC in 15 seconds or less to decrease falls risk in 8 weeks  Pt will: improve FGA score to 18/30 or greater to decrease falls risk in 8 weeks    Plan / Patient Education:     Continue with initial plan of care.  Progress with home program as tolerated.    Subjective:   Patient reports no pain today and no soreness following his last appointment.     Pain Rating: none     Objective:     Gait observation: non-antalgic    Treatment Today     TREATMENT MINUTES COMMENTS   Evaluation     Self-care/ Home management     Manual therapy     Neuromuscular Re-education 30   Forward steps over 1/2 foam: alternating without UE support, x 20 reps x 2 sets with CGA from therapist. Pt unable to clear foam with stepping over occasionally although no LOB. Pt taking longer steps with this today.    Lateral steps over 1/2 foam: alternating x 20 reps x 2 sets without UE support. Pt catching foam on L x 3 reps with each set. No LOB, cues for turning  "head with side steps.      Standing heel/toe rocking x 20 reps x 3 sets B with arm swing. Used mirror for visual cues to swing arms forward, rather than with rotation. No LOB. Cues for greater arm extension and less arm flexion to normalize reciprocal swing.     Standing trunk rotation with picture on the wall for visual cue to increase thoracic and cervical rotation x 20 reps alternating x 2 sets. Improved rotation of thoracic and lumbar spine in general today.    Sit<>stand on red foam x 10 reps x 1 sets without UE support. Cues and demonstration for leaning forward \"Diving\" upon standing. No LOB although required multiple attempts on a few reps.    Ambulation drills:  - Ambulation with head turns in hallways x 400 feet with up/down/right/left/center turns, mild lateral deviation present.   - Ambulation with cognitive task of naming alternating boys/girls names according to alphabet letters x 400 feet. Pt unable to maintain arm swings and narrowed CHRIST with addition of cognitive task.     Therapeutic Activity     Therapeutic Exercises 25 Nu-step WL4, 7:00 total, average SPM: 78    - Seated flexion with abduction, cues for posture and scap retraction on EOB x 10 reps  - Seated trunk flexion to reach the ground with arm overhead to promote thoracic rotation x 10 reps B, alternating sides. Improved trunk lean ROM noted today although many cues needed for laterality.     - Thread the needle in quadruped x 10 reps ( x 5 each side) with cues for looking overhead at hand to increase thoracic rotation.  - Alternating leg extension in quadruped, x 10 reps (x 5 each side) TC at hips to avoid rotation  - Glut raises on swiss ball x 10 reps x 2 sets, therapist holding the ball     Gait training     Modality__________________                Total 55    Blank areas are intentional and mean the treatment did not include these items.       Milli Can  9/7/2017  "

## 2021-06-12 NOTE — PROGRESS NOTES
[facility] Daily Progress     Patient Name: Antonio White  Date: 8/28/2017  Visit #: 4/8-10 visits - Medicare  PTA visit #:    Referral Diagnosis: Gait instability   Referring provider: Kyrie Lucero MD  Visit Diagnosis:     ICD-10-CM    1. Gait instability R26.81    2. Poor balance R26.89    3. Decreased functional mobility and endurance Z74.09          Assessment:   Patient continues to progress towards goals. He displays improved heel strike, arm swing and step length when ambulating at a comfortable pace. He deviates laterally and slows gait when challenged with head turns, but he was able to navigate obstacles fully without slowing gait. Pt is appropriate for continued PT services.     Goal Status:  Pt. will be independent with home exercise program in : 2 weeks  Pt will: perform 4 square step test with SPC without dowel strikes in 20 seconds or less to improve dynamic balance in 8 weeks  Pt will: improve comfortable gait speed to 0.90 m/s or greater without SPC to improve gait and decrease falls risk in 8 weeks  Pt will: perform TUG with SPC in 15 seconds or less to decrease falls risk in 8 weeks  Pt will: improve FGA score to 18/30 or greater to decrease falls risk in 8 weeks    Plan / Patient Education:     Continue with initial plan of care.  Progress with home program as tolerated.    Subjective:   Patient reports he is doing well overall. He feels his walking has improved- patient states his wife has remarked how much his walking has improved.     Pain Rating: none     Objective:     Gait observation: hips anterior, smaller CHRIST today- improved from last visit. Improved reciprocal arm swing noted with ambulation from lobby>gym.    Treatment Today     TREATMENT MINUTES COMMENTS   Evaluation     Self-care/ Home management     Manual therapy     Neuromuscular Re-education 40   Forward steps over dowel: alternating without UE support, x 20 reps x 2 sets with CGA from therapist. Used agility  dots today (red & green) to promote smaller CHRIST when stepping forward. No LOB.    Lateral steps over dowel: alternating x 20 reps x 2 sets without UE support. Used red and green agility dots. No LOB, cues for turning head with side steps. Pt has difficulty clearing foam on L > R steps, most noted with returning step.     Standing heel/toe rocking x 60 seconds x 2 reps B. Added arm swing x 20 reps x 3 sets bilaterally. Used mirror for visual cues to swing arms forward, rather than with rotation. LOB initially, after practice patient is able to perform with SBA without LOB.    Standing trunk rotation with picture on the wall for visual cue to increase thoracic and cervical rotation x 30 reps alternating x 1 set. Improved rotation today although pt was very restricted with L trunk rotation as he compensates with L cervical rotation to see picture on the wall.     Seated flexion with abduction x 10 reps. Therapist performing for mirroring with patient. Cues for scap retraction and increasing arm abduction.     Seated trunk lean to touch the ground x 20 reps alternating sides. Mirrored with arm overhead to increase trunk rotation. Pt requires shaping with nearly every repetition due to difficulty following demonstration from therapist.          Therapeutic Activity     Therapeutic Exercises 10 Nu-step not today    - Hip flexor stretch at EOB with green strap x 30 seconds x 2 reps B  - Added to HEP: supine hamstring stretch x 30 seconds x 2 reps B   Gait training 10   Ambulation with cues for reciprocal arm swing A/P motion, heel strike and stride length: no SPC used  - Head turns up/down/right/left, lateral deviation present with head turns and slowing of gait x 400 feet  - Backwards/forwards walking, therapist calling out direction change x 400 feet  - Obstacle course with hurdles and 1/2 foam, instructed to step over. Cues for arm swing between obstacles.      Modality__________________                Total 60    Blank  areas are intentional and mean the treatment did not include these items.       Milli Can  8/28/2017

## 2021-06-12 NOTE — PROGRESS NOTES
"[facility] Daily Progress     Patient Name: Antonio White  Date: 8/24/2017  Visit #: 3/8-10 visits - Medicare  PTA visit #:    Referral Diagnosis: Gait instability   Referring provider: Kyrie Lucero MD  Visit Diagnosis:     ICD-10-CM    1. Gait instability R26.81    2. Poor balance R26.89    3. Decreased functional mobility and endurance Z74.09          Assessment:   Patient returns to PT with improved gait pattern- CHRIST is slightly smaller with improved reciprocal arm swing and less rotation of arms. He is able to carry over cues during visit today with ambulation outdoors. Patient may benefit from hamstring stretching at next visit- he continues to ambulate with significant posterior pelvic tilt. He is appropriate for continued PT services.     Goal Status:  Pt. will be independent with home exercise program in : 2 weeks  Pt will: perform 4 square step test with SPC without dowel strikes in 20 seconds or less to improve dynamic balance in 8 weeks  Pt will: improve comfortable gait speed to 0.90 m/s or greater without SPC to improve gait and decrease falls risk in 8 weeks  Pt will: perform TUG with SPC in 15 seconds or less to decrease falls risk in 8 weeks  Pt will: improve FGA score to 18/30 or greater to decrease falls risk in 8 weeks    Plan / Patient Education:     Continue with initial plan of care.  Progress with home program as tolerated.    Subjective:   Patient reports he feels his walking has already improved. He notices he is able to swing his arms more easily \"I feel I want to get rid of this cane!\". Suggested pt continue use of SPC for stability and safety.     Pain Rating: none     Objective:     Gait observation: hips anterior, smaller CHRIST today- improved from last visit. Improved reciprocal arm swing noted with ambulation from lobby>gym.    Treatment Today     TREATMENT MINUTES COMMENTS   Evaluation     Self-care/ Home management     Manual therapy     Neuromuscular Re-education " 50   Forward steps over dowel: alternating without UE support, x 20 reps x 2 sets with CGA from therapist.  Added agility dots today (red & green) to promote smaller CHRIST when stepping forward. No dowel strikes today.    Lateral steps over dowel: alternating x 20 reps x 2 sets without UE support. Used red and green agility dots. No LOB, no dowel strikes today.    Ambulation x 100 feet with cues for B heel strike and reciprocal arm swing. Ambulated additional 400 feet outdoors on grass, pavement, rocks with CGA from therapist. Cues for heel strike provided, moderate instability present over rocks.    Standing heel/toe rocking x 60 seconds x 2 reps B. Added arm swing x 20 reps x 3 sets bilaterally. Used mirror for visual cues to swing arms forward, rather than with rotation. LOB initially, after practice patient is able to perform with SBA without LOB.    Standing trunk rotation with X on the wall for visual cue to increase thoracic and cervical rotation. Pt avoids rotation to end ranges due to LOB.     Seated flexion with abduction x 10 reps. Therapist performing for mirroring with patient. Cues for scap retraction and increasing arm abduction.     Seated trunk lean to touch the ground x 20 reps alternating sides. Mirrored with arm overhead to increase trunk rotation.     Therapeutic Activity     Therapeutic Exercises 10 Nu-step WL, 5, 6:00 total, subjective measures taken. Average SPM: 70.  Verbally reviewed HEP.   Gait training     Modality__________________                Total 60    Blank areas are intentional and mean the treatment did not include these items.       Milli Can  8/24/2017

## 2021-06-12 NOTE — TELEPHONE ENCOUNTER
INR result is 1.2  INR   Date Value Ref Range Status   09/22/2020 2.80 (!) 0.9 - 1.1 Final       Will the patient be seen, or did they already see, MD or CNP today? No    Most Recent Warfarin dose day/week  Sunday Monday Tuesday Wednesday Thursday Friday Saturday Sunday Monday Tuesday Wednesday Thursday Friday Saturday                Has the patient missed any doses of Coumadin, Warfarin, Jantoven in the past 7 days? Yes, missed meds, out of warfarin per assisted living    Has the patients medications changed since the last visit? No    Has the patient experienced any bleeding recently? No    Has the patient experienced any injuries or illness recently? Blood in underwear, blood in urine in last month    Has the patient experienced any 'new' shortness of breath, severe headaches, or changes in vision recently? No    Has the patient had any changes in their diet, or alcohol consumption? No    Is the patient here today to prepare for any type of upcoming surgery, procedure, or for a cardioversion procedure? No    What phone number can we reach the patient at today? home phone listed in demographics.

## 2021-06-12 NOTE — PROGRESS NOTES
[facility] Daily Progress     Patient Name: Antonio White  Date: 9/5/2017  Visit #: 6/8-10 visits - Medicare  PTA visit #:    Referral Diagnosis: Gait instability   Referring provider: Kyrie Lucero MD  Visit Diagnosis:     ICD-10-CM    1. Gait instability R26.81    2. Poor balance R26.89    3. Decreased functional mobility and endurance Z74.09          Assessment:   Patient returns to PT today without c/o hip pain. He feels taking the weekend off from exercise improved his pain. Re-introduced all gait and balance exercises today without c/o pain. Pt displays improved arm swing on L today after cues for relaxing UT and for upright posture. He is appropriate for continued PT services.     Goal Status:  Pt. will be independent with home exercise program in : 2 weeks  Pt will: perform 4 square step test with SPC without dowel strikes in 20 seconds or less to improve dynamic balance in 8 weeks  Pt will: improve comfortable gait speed to 0.90 m/s or greater without SPC to improve gait and decrease falls risk in 8 weeks  Pt will: perform TUG with SPC in 15 seconds or less to decrease falls risk in 8 weeks  Pt will: improve FGA score to 18/30 or greater to decrease falls risk in 8 weeks    Plan / Patient Education:     Continue with initial plan of care.  Progress with home program as tolerated.    Subjective:   Patient reports no pain today- he feels taking some time off his exercises improved his pain.  He had some soreness after his last PT appointment.     Pain Rating: none     Objective:     Gait observation: non-antalgic    Treatment Today     TREATMENT MINUTES COMMENTS   Evaluation     Self-care/ Home management     Manual therapy     Neuromuscular Re-education 45   Forward steps over dowel: alternating without UE support, x 20 reps x 2 sets with CGA from therapist. Used agility dots today (red & green) to promote smaller CHRIST when stepping forward. No LOB, good clearance of dowel.     Lateral steps  "over dowel: alternating x 20 reps x 2 sets without UE support. Used red and green agility dots. No LOB, cues for turning head with side steps. Pt striking dowel x 3 reps on L LE.     Standing heel/toe rocking x 20 reps x 3 sets B with arm swing. Used mirror for visual cues to swing arms forward, rather than with rotation. No LOB. Cues for greater arm extension and less arm flexion to normalize reciprocal swing.     Standing trunk rotation with picture on the wall for visual cue to increase thoracic and cervical rotation x 20 reps alternating x 2 sets. Cued patient to use thoracic motion, rather than just turning head. No LOB.    Sit<>stand on red foam x 10 reps x 2 sets without UE support. Cues and demonstration for leaning forward \"Diving\" upon standing.     Ambulation drills:  - Ambulation with head turns in hallways x 400 feet with up/down/right/left/center turns, mild lateral deviation present.   - Ambulation carrying a glass of water to practice functional arm swing x 400 feet R UE and x 400 feet L UE.   Cues provided for keeping UT relaxed for arm swing at patient's side. VC's for heel strike occasionally.      Therapeutic Activity     Therapeutic Exercises 10 Nu-step WL4, 5:00 total, average SPM: 77     - Seated flexion with abduction, cues for posture and scap retraction on EOB x 10 reps  - Seated trunk flexion to reach the ground with arm overhead to promote thoracic rotation x 10 reps B, alternating sides.        Gait training     Modality__________________                Total 55    Blank areas are intentional and mean the treatment did not include these items.       Milli Can  9/5/2017  "

## 2021-06-12 NOTE — TELEPHONE ENCOUNTER
Who is calling:  Honey  Reason for Call:  Caller stated she needs patients instructions, order, next INR date & dosing. Please fax to [phone] asap. She stated she received a fax but it wasn't what she needed  Date of last appointment with primary care: n/a  Okay to leave a detailed message: Yes

## 2021-06-12 NOTE — TELEPHONE ENCOUNTER
INR result is 2.5 finger stick  INR   Date Value Ref Range Status   10/20/2020 1.60 (!) 0.9 - 1.1 Final       Will the patient be seen, or did they already see, MD or CNP today? No    Most Recent Warfarin dose day/week  Sunday Monday Tuesday Wednesday Thursday Friday Saturday     2.5 mg 5 mg 2.5 mg 2.5 mg 2.5 mg     Sunday Monday Tuesday Wednesday Thursday Friday Saturday   2.5 mg 2.5 mg            Has the patient missed any doses of Coumadin, Warfarin, Jantoven in the past 7 days? No    Has the patients medications changed since the last visit? No    Has the patient experienced any bleeding recently? No    Has the patient experienced any injuries or illness recently? No    Has the patient experienced any 'new' shortness of breath, severe headaches, or changes in vision recently? No    Has the patient had any changes in their diet, or alcohol consumption? No    Is the patient here today to prepare for any type of upcoming surgery, procedure, or for a cardioversion procedure? No    What phone number can we reach the patient at today? home phone listed in demographics., [phone] Suzanne.

## 2021-06-12 NOTE — PROGRESS NOTES
[facility] Daily Progress     Patient Name: Antonio White  Date: 9/12/2017  Visit #: 8/8-10 visits - Medicare  PTA visit #:    Referral Diagnosis: Gait instability   Referring provider: Kyrie Lucero MD  Visit Diagnosis:     ICD-10-CM    1. Poor balance R26.89    2. Gait instability R26.81    3. Decreased functional mobility and endurance Z74.09          Assessment:   Patient continues to progress towards goals. He was unable to perform cross overs today due to lack of hip ROM and impaired balance- although he was able to perform steps from widened CHRIST to narrow CHRIST without LOB. Patient did appear somewhat fatigued at the end of treatment today but was willing to participate in all ex's. Pt is appropriate for continued PT services.     Goal Status:  Pt. will be independent with home exercise program in : 2 weeks  Pt will: perform 4 square step test with SPC without dowel strikes in 20 seconds or less to improve dynamic balance in 8 weeks  Pt will: improve comfortable gait speed to 0.90 m/s or greater without SPC to improve gait and decrease falls risk in 8 weeks  Pt will: perform TUG with SPC in 15 seconds or less to decrease falls risk in 8 weeks  Pt will: improve FGA score to 18/30 or greater to decrease falls risk in 8 weeks    Plan / Patient Education:     Continue with initial plan of care.  Progress with home program as tolerated.    Subjective:   Patient reports no new changes or updates.     Pain Rating: none     Objective:     Gait observation: non-antalgic    Treatment Today     TREATMENT MINUTES COMMENTS   Evaluation     Self-care/ Home management     Manual therapy     Neuromuscular Re-education 30   Forward steps over 1/2 foam: alternating without UE support, x 20 reps x 2 sets with CGA from therapist. Pt able to clear foam with more reps today.     Lateral steps over 1/2 foam: alternating x 20 reps x 2 sets without UE support. Pt catching foam on L x 3 reps with each set. No LOB, cues  "for turning head with side steps.      Standing heel/toe rocking x 20 reps x 3 sets B with arm swing. Used mirror for visual cues to swing arms forward, rather than with rotation. No LOB. Added yellow/purple agility dots to narrow CHRIST.     Standing trunk rotation with picture on the wall for visual cue to increase thoracic and cervical rotation x 20 reps alternating x 2 sets. Improved rotation of thoracic and lumbar spine in general today although consistent VC's needed for laterality. Added yellow/purple agility dots to increase CHRIST.    Sit<>stand on green foam x 10 reps x 2 sets without UE support. Cues and demonstration for leaning forward \"Diving\" upon standing. CGA from therapist. Pt fatigued at the end of 2 sets.       Ambulation drills:  - Side steps from widened CHRIST to narrowed CHRIST on 20 foot line, facing forward x 4 reps each direction. Cued for keeping face forward, pt tends to rotate.         Therapeutic Activity     Therapeutic Exercises 25 Nu-step WL5, 8:00 total, average SPM: 78    - Seated flexion with abduction, cues for posture and scap retraction on EOB x 10 reps  - Seated trunk flexion to reach the ground with arm overhead to promote thoracic rotation x 10 reps B, alternating sides. Improved trunk lean ROM noted today although many cues needed for laterality.     - Quadruped ex's not performed today.      Gait training     Modality__________________                Total 55    Blank areas are intentional and mean the treatment did not include these items.       Milli Can  9/12/2017  "

## 2021-06-12 NOTE — TELEPHONE ENCOUNTER
ANTICOAGULATION  MANAGEMENT- Home Care/Care Facility Result    Assessment     Today's INR result of 2.5 is Therapeutic (goal INR of 2.0-3.0)        Warfarin taken as previously instructed    No new diet changes affecting INR    No new medication/supplements affecting INR    Continues to tolerate warfarin with no reported s/s of bleeding or thromboembolism     Previous INR was Therapeutic    Plan:     Spoke with home care nurse Suzanne discussed INR result and instructed:     Warfarin Dosing Instructions: Continue current warfarin dose 5 mg daily on wed; and 2.5 mg daily rest of week     Next INR to be drawn: one week    Suzanne verbalizes understanding and agrees to warfarin dosing plan.   ?   Stacy Chahal RN    Subjective/Objective:      Antonio White, a 85 y.o. male is established on warfarin.     Home care/care facility RN's report of Antonio INR, recent warfarin dosing, diet changes, medication changes, and symptoms is documented below.    Additional findings: none    Anticoagulation Episode Summary     Current INR goal:  2.0-3.0   TTR:  66.6 % (11.9 mo)   Next INR check:  11/3/2020   INR from last check:  2.50 (10/27/2020)   Weekly max warfarin dose:     Target end date:     INR check location:     Preferred lab:     Send INR reminders to:  [facility]    Indications    Chronic atrial fibrillation (H) [I48.20]           Comments:  chronic           Anticoagulation Care Providers     Provider Role Specialty Phone number    Joey Garcia MD Referring Cardiology [phone]

## 2021-06-12 NOTE — TELEPHONE ENCOUNTER
INR result is    2.9  INR   Date Value Ref Range Status   10/27/2020 2.50 (!) 0.9 - 1.1 Final       Will the patient be seen, or did they already see, MD or CNP today? No    Most Recent Warfarin dose day/week  Sunday Monday Tuesday Wednesday Thursday Friday Saturday     2.5 mg 5 mg 2.5 mg 2.5 mg 2.5 mg     Sunday Monday Tuesday Wednesday Thursday Friday Saturday   2.5 mg 2.5 mg            Has the patient missed any doses of Coumadin, Warfarin, Jantoven in the past 7 days? No    Has the patients medications changed since the last visit? No    Has the patient experienced any bleeding recently? No    Has the patient experienced any injuries or illness recently? No    Has the patient experienced any 'new' shortness of breath, severe headaches, or changes in vision recently? No    Has the patient had any changes in their diet, or alcohol consumption? No    Is the patient here today to prepare for any type of upcoming surgery, procedure, or for a cardioversion procedure? No    What phone number can we reach the patient at today? [facility]  [phone].

## 2021-06-12 NOTE — TELEPHONE ENCOUNTER
Called and spoke with VINAY Pichardo with [facility].     - FAXED INR results of 10/27/20

## 2021-06-12 NOTE — TELEPHONE ENCOUNTER
FYI - Status Update  Who is Calling: Kylee Medley [facility]   [phone]  Update: Patient had INR drawn yesterday, and dosing information was given to HomeCare nurse from [facility].  Caller is with the assisted living program where patient resides. They also need to have the orders and a date when next INR should be drawn.    Please FAX orders for dosing and INR re-check to them at [phone].    Okay to leave a detailed message?:  Yes

## 2021-06-12 NOTE — PROGRESS NOTES
[facility] Daily Progress     Patient Name: Antonio White  Date: 8/21/2017  Visit #: 2/8-10 visits - Medicare  PTA visit #:    Referral Diagnosis: Gait instability   Referring provider: Kyrie Lucero MD  Visit Diagnosis:     ICD-10-CM    1. Gait instability R26.81    2. Poor balance R26.89    3. Decreased functional mobility and endurance Z74.09          Assessment:   Patient returns for first PT follow up. HEP was initiated and handouts given for home strengthening. Patient continues to display widened CHRIST and widened arm swing with ambulation- he is able to adjust to a more reciprocal swing with cues. He demonstrates significant trunk rigidity which is likely impairing his reciprocal arm swing. Plan to focus on trunk mobility at next session.      Goal Status:  Pt. will be independent with home exercise program in : 2 weeks  Pt will: perform 4 square step test with SPC without dowel strikes in 20 seconds or less to improve dynamic balance in 8 weeks  Pt will: improve comfortable gait speed to 0.90 m/s or greater without SPC to improve gait and decrease falls risk in 8 weeks  Pt will: perform TUG with SPC in 15 seconds or less to decrease falls risk in 8 weeks  Pt will: improve FGA score to 18/30 or greater to decrease falls risk in 8 weeks    Plan / Patient Education:     Continue with initial plan of care.  Progress with home program as tolerated.    Subjective:   Patient reports no changes since evaluation. No questions today.    Pain Rating: none     Objective:     Gait observation: hips anterior, decreased step length with widened CHRIST, impaired trunk rotation and arms widened    Treatment Today     TREATMENT MINUTES COMMENTS   Evaluation     Self-care/ Home management     Manual therapy     Neuromuscular Re-education 15   Forward steps over dowel: alternating, x 10 reps with UE support, x 20 reps x 2 sets without UE support with CGA from therapist. Cues for greater step height with forward  "and returning step L>R sided.     Lateral steps over dowel: alternating x 20 reps x 2 sets without UE support, cues for turning head to look over shoulder. Pt striking dowel R > L sided today.     Ambulation x 400 feet with cues for B heel strike and reciprocal arm swing. Pt able to adjust arm swing with more narrow CHRIST but is unable to hold this for >20 feet.      Therapeutic Activity     Therapeutic Exercises 45 Initiated HEP with handouts of the following:  Exercises:  Exercise #1: Piriformis stretch and hip flexor stretch with a bed sheet in supine   Comment #1: HEP x 30-45 seconds   Exercise #2: Bridges  Comment #2: HEP x 10 reps  Exercise #3: Clamshell  Comment #3: HEP to fatigue B    Also performed in clinic:  - Wall slides x 5 second hold x 10 reps  - Standing hip abduction x 20 reps, cues for posture   - Step up onto 6\" step x 10 reps B, cues for pushing through heel for glut activation, no UE support. VC's for \"driving knee\" to increase step height. CGA from therapist.      Gait training     Modality__________________                Total 60    Blank areas are intentional and mean the treatment did not include these items.       Milli Can  8/21/2017    "

## 2021-06-12 NOTE — TELEPHONE ENCOUNTER
ACN called [facility] and spoke with nurse Pichardo and updated that encounter was faxed to their facility.    She confirmed receipt of encounter.    Sharonda Dumas RN

## 2021-06-13 NOTE — TELEPHONE ENCOUNTER
INR result is 2.9  INR   Date Value Ref Range Status   11/03/2020 2.90 (!) 0.9 - 1.1 Final       Will the patient be seen, or did they already see, MD or CNP today? No    Most Recent Warfarin dose day/week  Sunday Monday Tuesday Wednesday Thursday Friday Saturday     2.5 5 2.5 2.5 2.5     Sunday Monday Tuesday Wednesday Thursday Friday Saturday   2.5 2.5            Has the patient missed any doses of Coumadin, Warfarin, Jantoven in the past 7 days? No    Has the patients medications changed since the last visit? No    Has the patient experienced any bleeding recently? No    Has the patient experienced any injuries or illness recently? No    Has the patient experienced any 'new' shortness of breath, severe headaches, or changes in vision recently? No    Has the patient had any changes in their diet, or alcohol consumption? No    Is the patient here today to prepare for any type of upcoming surgery, procedure, or for a cardioversion procedure? No    What phone number can we reach the patient at today? home phone listed in demographics.

## 2021-06-13 NOTE — TELEPHONE ENCOUNTER
INR result is 2.6  INR   Date Value Ref Range Status   11/10/2020 2.90 (!) 0.9 - 1.1 Final       Will the patient be seen, or did they already see, MD or CNP today? No    Most Recent Warfarin dose day/week  Sunday Monday Tuesday Wednesday Thursday Friday Saturday     2.5 2.5 2.5 2.5 2.5     Sunday Monday Tuesday Wednesday Thursday Friday Saturday   2.5             Has the patient missed any doses of Coumadin, Warfarin, Jantoven in the past 7 days? No    Has the patients medications changed since the last visit? No    Has the patient experienced any bleeding recently? No    Has the patient experienced any injuries or illness recently? No    Has the patient experienced any 'new' shortness of breath, severe headaches, or changes in vision recently? No    Has the patient had any changes in their diet, or alcohol consumption? No    Is the patient here today to prepare for any type of upcoming surgery, procedure, or for a cardioversion procedure? No    What phone number can we reach the patient at today?  Sharonda MCLEAN [facility] [phone]

## 2021-06-13 NOTE — PROGRESS NOTES
[facility] Discharge Summary  Patient Name: Antonio White  Date: 9/19/2017  Referral Diagnosis: Gait instability   Referring provider: Kyrie Lucero MD  Visit Diagnosis:   1. Poor balance     2. Gait instability     3. Decreased functional mobility and endurance         Goals:  Pt. will be independent with home exercise program in : 2 weeks;Met  Pt will: perform 4 square step test with SPC without dowel strikes in 20 seconds or less to improve dynamic balance in 8 weeks (goal partially met; pt able to perform in <20 seconds but strikes dowel)  Pt will: improve comfortable gait speed to 0.90 m/s or greater without SPC to improve gait and decrease falls risk in 8 weeks (goal met)  Pt will: perform TUG with SPC in 15 seconds or less to decrease falls risk in 8 weeks (goal met WITHOUT SPC)  Pt will: improve FGA score to 18/30 or greater to decrease falls risk in 8 weeks (goal met, pt scored 18/30 today)    Patient was seen for 10 visits from initial evaluation to 9/19/17 with 0 missed appointments.  The patient met goals and has demonstrated understanding of and independence in the home program for self-care, and progression to next steps.  He will initiate contact if questions or concerns arise.    Therapy will be discontinued at this time.  The patient will need a new referral to resume.    Thank you for your referral.  Milli Coboshildacarola  9/19/2017  2:43 PM  [facility] Daily Progress     Patient Name: Antonio White  Date: 9/19/2017  Visit #: 10/8-10 visits - Medicare  PTA visit #:    Referral Diagnosis: Gait instability   Referring provider: Kyrie Lucero MD  Visit Diagnosis:     ICD-10-CM    1. Poor balance R26.89    2. Gait instability R26.81    3. Decreased functional mobility and endurance Z74.09          Assessment:   Patient has been seen for 10 PT visits since evaluation. Balance and gait measures were re-tested today and pt displays improvements on all measures. Patient's  dynamic balance has improved as evidenced by a decrease in time on the 4 square step test from 83 seconds to 16.2 seconds. TUG decreased from 18.54 seconds with SPC to 13.78 seconds without SPC. His comfortable gait speed increased from 0.76 m/s to 0.96 m/s, indicating a decrease in falls risk. Balance also improved on the Functional Gait Assessment as his score increased from 12/28 to 18/28. Based on his score of 18/28 he is still considered a high falls risk. Discussed importance of HEP compliance and exercising- such as walking indoors or at the mall to help maintain gains made in physical therapy. Educated pt he may be appropriate to return to PT if balance/strength declines in the future.       Goal Status:  Pt. will be independent with home exercise program in : 2 weeks;Met  Pt will: perform 4 square step test with SPC without dowel strikes in 20 seconds or less to improve dynamic balance in 8 weeks (goal partially met; pt able to perform in <20 seconds but strikes dowel)  Pt will: improve comfortable gait speed to 0.90 m/s or greater without SPC to improve gait and decrease falls risk in 8 weeks (goal met)  Pt will: perform TUG with SPC in 15 seconds or less to decrease falls risk in 8 weeks (goal met WITHOUT SPC)  Pt will: improve FGA score to 18/30 or greater to decrease falls risk in 8 weeks (goal met, pt scored 18/30 today)    Plan / Patient Education:       Subjective:   Patient reports he is walking better and feels his walking has improved since starting PT. He notices he feels more stable when ambulating outdoors.    Pain Rating: none     Objective:          Evaluation Date: 8/9/17 Testing Date:   9/19/17   30 second sit<>stand   8 reps 9 reps   TUG   18.54 with SPC seconds 17. 09 seconds with SPC. 13.87 seconds without SPC   Cognitive TUG   19.85 seconds seconds 17.40 seconds with SPC   Comfortable Gait Speed   0.76 m/s without SPC 6.15 seconds, 0.96 m/s   Fast Gait Speed   1.09 m/s without SPC 5.19  seconds, 1.16 m/s   4 Square Step Test   83 seconds with SPC and 5 dowel strikes 16.2 seconds with SPC, 1 dowel strikes    2 Minute Walk test   NT  NT   FGA   12/28 18/28                Functional Gait Assessment:                          Gait Level surface: (2)                        Change in Gait Speed: (3) no s/s imbalance or lateral deviation                        Gait with horizontal head turns: (2) slight lateral deviation                         Gait with vertical head turns: (3) imbalance                        Gait and Pivot Turn: (2) slow, safe                        Step Over Obstacle: (1) hits 1 shoe box with toe                         Gait with Narrow Base of Support: (0)                         Gait with Eyes Closed: (2)                        Ambulating Backwards: (2)                        Steps: (1)     Total Score: 18/30        Treatment Today     TREATMENT MINUTES COMMENTS   Evaluation     Self-care/ Home management     Manual therapy     Neuromuscular Re-education 35   Forward steps from yellow foam onto ground alternating without UE support, x 20 reps x 2 sets with CGA from therapist. Min A x 2 to correct LOB. Cues for taller steps, not longer steps.     Lateral steps from yellow foam onto ground: alternating x 20 reps x 2 sets without UE support. Pt able to clear foam, but has difficulty re-centering feet at start position. LOB x 3 posteriorly during second set.     Balance re-testing as above.      Therapeutic Activity     Therapeutic Exercises 20 Nu-step WL5, 7:00 total, average SPM: 78    Seated flexion with abduction x 10 reps, cues for scap retraction.  Seated SB reach to the floor with contralateral UE overhead x 10 reps total.     Gait training     Modality__________________                Total 55    Blank areas are intentional and mean the treatment did not include these items.       Milli Can  9/19/2017

## 2021-06-13 NOTE — TELEPHONE ENCOUNTER
ANTICOAGULATION  MANAGEMENT- Home Care/Care Facility Result    Assessment     Today's INR result of 2.3 is Therapeutic (goal INR of 2.0-3.0)        Warfarin taken as previously instructed    No new diet changes affecting INR    No new medication/supplements affecting INR    Continues to tolerate warfarin with no reported s/s of bleeding or thromboembolism     Previous INR was Therapeutic     Patient moved to [facility] - staff does medication administration- home care only checks INR.    Will fax encounter to [facility] @[phone]    Plan:     Spoke with Home Care Nurse Tommy discussed INR result and instructed:     Warfarin Dosing Instructions: Continue current warfarin dose    5 mg every Wed; 2.5 mg all other days        (0 % change)    Next INR to be drawn: 2 weeks  ACN called and spoke with Coral yang at [facility] and made aware that encounter will be faxed to there facility. She stated that once the patient is discharged from Home Care - anticoagulation management will be changed to the patient's PCP with [facility].    Education provided: importance of therapeutic range    Tommy verbalizes understanding and agrees to warfarin dosing plan.   ?   Sharonda Dumas RN    Subjective/Objective:      Antonio White, a 85 y.o. male is established on warfarin.     Home care/care facility RN's report of Antonio INR, recent warfarin dosing, diet changes, medication changes, and symptoms is documented below.    Additional findings: none    Anticoagulation Episode Summary     Current INR goal:  2.0-3.0   TTR:  66.6 % (11.9 mo)   Next INR check:  1/5/2021   INR from last check:  2.30 (12/22/2020)   Weekly max warfarin dose:     Target end date:     INR check location:     Preferred lab:     Send INR reminders to:  [facility]    Indications    Chronic atrial fibrillation (H) [I48.20]           Comments:  chronic           Anticoagulation  Care Providers     Provider Role Specialty Phone number    Joey Garcia MD Referring Cardiology [phone]

## 2021-06-13 NOTE — PROGRESS NOTES
Anticoagulation Annual Referral Renewal Review    Antonio White's chart reviewed for annual renewal of referral to anticoagulation monitoring.        Criteria for anticoagulation nurse and/or pharmacist renewal met   Warfarin indication: Atrial Fibrillation Yes, per indication   Current with INR monitoring/compliant Yes Yes   Date of last office visit 9/17/2020 Yes, had office visit within last year   Time in Therapeutic Range (TTR) 66 % Yes, TTR > 60%       Antonio White met all criteria for anticoagulation management program initiated renewal.  New INR standing orders and anticoagulation referral renewal placed.      Sharonda Dumas RN  3:53 PM

## 2021-06-13 NOTE — TELEPHONE ENCOUNTER
Spoke with Sasha with [facility] and made aware that encounter will be faxed to their facility.    Sharonda Dumsa RN

## 2021-06-13 NOTE — TELEPHONE ENCOUNTER
ANTICOAGULATION  MANAGEMENT- Home Care/Care Facility Result    Assessment     Today's INR result of 2.6 is Therapeutic (goal INR of 2.0-3.0)        Warfarin taken as previously instructed    No new diet changes affecting INR    No new medication/supplements affecting INR    Continues to tolerate warfarin with no reported s/s of bleeding or thromboembolism     Previous INR was Therapeutic     Will fax encounter to [facility] ( fax  ) due to they are doing meds admin for patient.         Plan:     Spoke with Home Care Nurse Sharonda discussed INR result and instructed:     Warfarin Dosing Instructions: Continue current warfarin dose    5 mg every Wed; 2.5 mg all other days       (0 % change)    Next INR to be drawn: 2 weeks    Education provided: importance of therapeutic range and importance of taking warfarin as instructed    Sharonda verbalizes understanding and agrees to warfarin dosing plan.   ?   Sharonda Dumas RN    Subjective/Objective:      Antonio White, a 85 y.o. male is established on warfarin.     Home care/care facility RN's report of Antonio INR, recent warfarin dosing, diet changes, medication changes, and symptoms is documented below.    Additional findings: template incorrect; verbally confirmed home dose with Sharonda and updated on anticoagulation calendar    Anticoagulation Episode Summary     Current INR goal:  2.0-3.0   TTR:  66.6 % (11.9 mo)   Next INR check:  12/8/2020   INR from last check:  2.60 (11/24/2020)   Weekly max warfarin dose:     Target end date:     INR check location:     Preferred lab:     Send INR reminders to:  [facility] C[facility]    Indications    Chronic atrial fibrillation (H) [I48.20]           Comments:  chronic           Anticoagulation Care Providers     Provider Role Specialty Phone number    Joey Garcia MD Referring Cardiology [phone]

## 2021-06-13 NOTE — PROGRESS NOTES
INR 2.0 INR stable. Discussed continuing management of dose of Warfarin and returning in one month . No changes to diet needed at this time. Continue moderate intake of Vitamin K and call if increase bruising or unexplained bleeding. Call with medication changes or upcoming procedures.

## 2021-06-13 NOTE — PROGRESS NOTES
[facility] Daily Progress     Patient Name: Antonio White  Date: 9/14/2017  Visit #: 9/8-10 visits - Medicare  PTA visit #:    Referral Diagnosis: Gait instability   Referring provider: Kyrie Lucero MD  Visit Diagnosis:     ICD-10-CM    1. Poor balance R26.89    2. Gait instability R26.81    3. Decreased functional mobility and endurance Z74.09          Assessment:   Patient continues to progress towards goals. His balance was further challenged today with the use of yellow foams during stepping as he lost balance multiple times with forward steps due to poor foot proprioception. He displays L > R sided impairments in step length with ambulation drills today which is likely due to weakness and from his L sided hip Fx. Pt is appropriate for continued PT services.     Goal Status:  Pt. will be independent with home exercise program in : 2 weeks  Pt will: perform 4 square step test with SPC without dowel strikes in 20 seconds or less to improve dynamic balance in 8 weeks  Pt will: improve comfortable gait speed to 0.90 m/s or greater without SPC to improve gait and decrease falls risk in 8 weeks  Pt will: perform TUG with SPC in 15 seconds or less to decrease falls risk in 8 weeks  Pt will: improve FGA score to 18/30 or greater to decrease falls risk in 8 weeks    Plan / Patient Education:     Continue with initial plan of care.  Progress with home program as tolerated.    Subjective:   Patient reports he feels happy with how is walking has improved. He feels more confident with walking outdoors and indoors- he feels he rarely needs his cane at home.     Pain Rating: none     Objective:     Gait observation: non-antalgic    Treatment Today     TREATMENT MINUTES COMMENTS   Evaluation     Self-care/ Home management     Manual therapy     Neuromuscular Re-education 35   Forward steps from yellow foam onto ground alternating without UE support, x 20 reps x 2 sets with CGA from therapist. Min A x 2 to  "correct LOB. Cues for taller steps, not longer steps.     Lateral steps from yellow foam onto ground: alternating x 20 reps x 2 sets without UE support. Pt able to clear foam, but has difficulty re-centering feet at start position. No major LOB.      Standing heel/toe rocking x 20 reps x 3 sets B with arm swing. Used mirror for visual cues to swing arms forward, rather than with rotation. No LOB. UsedAdded yellow/purple agility dots to narrow CHRIST.     Standing trunk rotation with picture on the wall for visual cue to increase thoracic and cervical rotation x 20 reps alternating x 2 sets. Less cues needed for thoracic rotation and tall posture overall today.  Used yellow/purple agility dots to increase CHRIST.    Sit<>stand on green foam x 10 reps x 1 setwithout UE support. Cues and demonstration for leaning forward \"Diving\" upon standing. CGA from therapist. VC's occasionally to scoot to edge of the chair.      Ambulation drills:  - Side steps from widened CHRIST to narrowed CHRIST on 20 foot line, facing forward x 1 rep R and L. Repeated with steps forward; pt forgetting to perform with alternating sides. Performed with backwards steps to NBOS, pt displays decreased L step length and LOB x 2.         Therapeutic Activity     Therapeutic Exercises 20 Nu-step WL5, 7:00 total, average SPM: 78    - Thread the needle in quadruped x 6 reps ( x 3 each side) with cues for looking overhead at hand to increase thoracic rotation.  - Alternating leg extension in quadruped, x 10 reps (x 5 each side) TC at hips to avoid rotation and for flattened Lx spine.  - Glut raises on swiss ball x 10 reps x 2 sets, therapist holding green swiss ball     Gait training     Modality__________________                Total 55    Blank areas are intentional and mean the treatment did not include these items.       Milli Can  9/14/2017  "

## 2021-06-13 NOTE — TELEPHONE ENCOUNTER
ANTICOAGULATION  MANAGEMENT- Home Care/Care Facility Result    Assessment     Today's INR result of 2.9 is Therapeutic (goal INR of 2.0-3.0)        Warfarin taken as previously instructed    No new diet changes affecting INR    No new medication/supplements affecting INR    Continues to tolerate warfarin with no reported s/s of bleeding or thromboembolism     Previous INR was Therapeutic     Will fax encounter to [facility] ( fax  ) due to they are doing meds admin for patient.    Plan:     Spoke with Home Care Nurse Sharonda discussed INR result and instructed:     Warfarin Dosing Instructions: Continue current warfarin dose    5 mg every Wed; 2.5 mg all other days     (0 % change)    Next INR to be drawn: 2 weeks      Education provided: importance of therapeutic range    Sharonda verbalizes understanding and agrees to warfarin dosing plan.   ?   Sharonda Dumas RN    Subjective/Objective:      Antonio White, a 85 y.o. male is established on warfarin.     Home care/care facility RN's report of Antonio INR, recent warfarin dosing, diet changes, medication changes, and symptoms is documented below.    Additional findings: none    Anticoagulation Episode Summary     Current INR goal:  2.0-3.0   TTR:  66.6 % (11.9 mo)   Next INR check:  11/24/2020   INR from last check:  2.90 (11/10/2020)   Weekly max warfarin dose:     Target end date:     INR check location:     Preferred lab:     Send INR reminders to:  [facility] CARE    Indications    Chronic atrial fibrillation (H) [I48.20]           Comments:  chronic           Anticoagulation Care Providers     Provider Role Specialty Phone number    Joey Garcia MD Referring Cardiology [phone]

## 2021-06-13 NOTE — TELEPHONE ENCOUNTER
INR result is 2.3  INR   Date Value Ref Range Status   12/08/2020 2.70 (!) 0.9 - 1.1 Final       Will the patient be seen, or did they already see, MD or CNP today? No    Most Recent Warfarin dose day/week  Sunday Monday Tuesday Wednesday Thursday Friday Saturday     2.5 5 2.5 2.5 2.5     Sunday Monday Tuesday Wednesday Thursday Friday Saturday   2.5 2.5            Has the patient missed any doses of Coumadin, Warfarin, Jantoven in the past 7 days? No    Has the patients medications changed since the last visit? No    Has the patient experienced any bleeding recently? No    Has the patient experienced any injuries or illness recently? No    Has the patient experienced any 'new' shortness of breath, severe headaches, or changes in vision recently? No    Has the patient had any changes in their diet, or alcohol consumption? No    Is the patient here today to prepare for any type of upcoming surgery, procedure, or for a cardioversion procedure? No    What phone number can we reach the patient at today? home phone listed in demographics.

## 2021-06-13 NOTE — TELEPHONE ENCOUNTER
INR result is 2.7  INR   Date Value Ref Range Status   11/24/2020 2.60 (!) 0.9 - 1.1 Final       Will the patient be seen, or did they already see, MD or CNP today? No    Most Recent Warfarin dose day/week  Sunday Monday Tuesday Wednesday Thursday Friday Saturday     2.5 5 2.5 2.5 2.5     Sunday Monday Tuesday Wednesday Thursday Friday Saturday   2.5 2.5            Has the patient missed any doses of Coumadin, Warfarin, Jantoven in the past 7 days? No    Has the patients medications changed since the last visit? No    Has the patient experienced any bleeding recently? No    Has the patient experienced any injuries or illness recently? No    Has the patient experienced any 'new' shortness of breath, severe headaches, or changes in vision recently? No    Has the patient had any changes in their diet, or alcohol consumption? Diet changed due to new facility, new menu    Is the patient here today to prepare for any type of upcoming surgery, procedure, or for a cardioversion procedure? No    What phone number can we reach the patient at today? home phone listed in demographics.

## 2021-06-13 NOTE — TELEPHONE ENCOUNTER
ANTICOAGULATION  MANAGEMENT- Home Care/Care Facility Result    Assessment     Today's INR result of 2.7 is Therapeutic (goal INR of 2.0-3.0)        Warfarin taken as previously instructed    No new diet changes affecting INR    No new medication/supplements affecting INR    Continues to tolerate warfarin with no reported s/s of bleeding or thromboembolism     Previous INR was Therapeutic    Plan:     Spoke with Michael, home care nurse, discussed INR result and instructed:     Warfarin Dosing Instructions: Continue current warfarin dose 5 mg daily on Wed; and 2.5 mg daily rest of week  (0 % change)    Next INR to be drawn: 2 weeks    Education provided: target INR goal and significance of current INR result    Michael verbalizes understanding and agrees to warfarin dosing plan.   ?   Malinda Pimentel RN    Subjective/Objective:      Antonio White, a 85 y.o. male is established on warfarin.     Home care/care facility RN's report of Antonio INR, recent warfarin dosing, diet changes, medication changes, and symptoms is documented below.    Additional findings: none    Anticoagulation Episode Summary     Current INR goal:  2.0-3.0   TTR:  66.6 % (11.9 mo)   Next INR check:  12/22/2020   INR from last check:  2.70 (12/8/2020)   Weekly max warfarin dose:     Target end date:     INR check location:     Preferred lab:     Send INR reminders to:  [facility]    Indications    Chronic atrial fibrillation (H) [I48.20]           Comments:  chronic           Anticoagulation Care Providers     Provider Role Specialty Phone number    Joey Garcia MD Referring Cardiology [phone]

## 2021-06-14 NOTE — TELEPHONE ENCOUNTER
INR result is 2.4 finger stick  INR   Date Value Ref Range Status   12/22/2020 2.30 (!) 0.9 - 1.1 Final       Will the patient be seen, or did they already see, MD or CNP today? No    Most Recent Warfarin dose day/week  Sunday Monday Tuesday Wednesday Thursday Friday Saturday     2.5 mg 5 mg 2.5 mg 2.5 mg 2.5 mg     Sunday Monday Tuesday Wednesday Thursday Friday Saturday   2.5 mg 2.5 mg            Has the patient missed any doses of Coumadin, Warfarin, Jantoven in the past 7 days? No    Has the patients medications changed since the last visit? No    Has the patient experienced any bleeding recently? No    Has the patient experienced any injuries or illness recently? No    Has the patient experienced any 'new' shortness of breath, severe headaches, or changes in vision recently? No    Has the patient had any changes in their diet, or alcohol consumption? No    Is the patient here today to prepare for any type of upcoming surgery, procedure, or for a cardioversion procedure? No    What phone number can we reach the patient at today? [phone] Estefani

## 2021-06-14 NOTE — PROGRESS NOTES
[facility] Office Note    Assessment / Plan:    1.  Atrial fibrillation with adequate rate control on present medical regimen  2.  Coronary artery disease  Stable on medical therapy without symptoms  3.  Valvular heart disease .  Mild  aortic insufficiency.  Moderate mitral insufficiency .  No changes indicated at this time.  Plan repeat echocardiogram in 1 year  4.  Ascending aortic aneurysm.  Will recheck CT chest      Plan follow-up  1 year    ______________________________________________________________________    Subjective:    I had the opportunity to see Antonio White at the [facility]. Antonio White is a 82 y.o. male with a known history of coronary artery disease status post percutaneous intervention to the circumflex in May 2013.  He also has a history of atrial fibrillation, maintained on warfarin with generally therapeutic INRs .  MRI demonstrated bicuspid aortic valve with some aneurysmal dilatation of the aortic root with aortic insufficiency.   A follow-up CT 2015 showed aortic root enlargement at 4.2 cm.  He returns today for routine follow-up.    Since I saw him last, he has felt well.  He seldom notes shortness of breath except with climbing steps and this is stable versus last year.  He is not aware of any chest pain or palpitations.  He sleeps well and awakens refreshed in the mornings.  He is not doing much in the way of exercise although he has a treadmill at home.  He has had no further falls after completing a course of therapy.  He has had no bruising or bleeding problems, his INR has been stable.    ______________________________________________________________________    Problem List:  Patient Active Problem List   Diagnosis     Adenocarcinoma Of The Prostate Gland     Benign Essential Hypertension     Aortic Regurgitation     Atrial fibrillation     Ascending aortic aneurysm     Mitral insufficiency     Closed left hip fracture     Kidney disease, chronic,  stage III (GFR 30-59 ml/min)     Coronary artery disease     Dyspepsia     Personal history of thromboembolic disease     Anemia     Osteoporosis     Contusion of left hip, initial encounter     Medical History:  Past Medical History:   Diagnosis Date     Aortic regurgitation      Atrial fibrillation      Coronary artery disease      Hyperlipidemia      Hypertension      Kidney disease, chronic, stage III (GFR 30-59 ml/min)      Kidney stone      Malignant melanoma     left chest     MI (myocardial infarction) 2013    x1     Mitral valve prolapse      Personal history of thromboembolic disease 3/6/2016    History thromboembolic MI in 2013; continue indefinite anticoagulation unless risks outweight benefits.     Prostate cancer     had radioactive seed treatment     Surgical History:  Past Surgical History:   Procedure Laterality Date     CYSTOSCOPY W/ URETERAL STENT PLACEMENT Right 10/31/2014    Procedure: CYSTOSCOPY, RIGHT URETEROSCOPY AND STENT INSERTION;  Surgeon: Chico Nur MD;  Location: [facility];  Service:      HERNIA REPAIR       melanoma surgery      Removal of melanoma on chest.  Cleared by surgeon 1 year ago.     PARTIAL HIP ARTHROPLASTY Left 3/4/2016    Procedure: LEFT HIP FRACTURE BIPOLAR ;  Surgeon: Nichole Menon MD;  [place] [facility]  Service:      VT ANESTH,REPAIR UPPER ABD HERNIA NOS Bilateral     ingiunal      TONSILECTOMY, ADENOIDECTOMY, BILATERAL MYRINGOTOMY AND TUBES       TONSILLECTOMY      at age 7     Social History:  Social History     Social History     Marital status:      Spouse name: N/A     Number of children: N/A     Years of education: N/A     Occupational History     Not on file.     Social History Main Topics     Smoking status: Never Smoker     Smokeless tobacco: Never Used     Alcohol use 2.4 oz/week     4 Glasses of wine per week      Comment: 4     Drug use: No     Sexual activity: Not on file     Other Topics Concern     Not on file      Social History Narrative         Review of Systems:   General: WNL  Eyes: WNL  Ears/Nose/Throat: WNL  Lungs: WNL  Heart: Shortness of Breath with activity, Irregular Heartbeat  Stomach: WNL  Bladder: WNL  Muscle/Joints: WNL  Skin: WNL  Nervous System: WNL  Mental Health: WNL     Blood: WNL          Family History:  Family History   Problem Relation Age of Onset     Heart disease Mother      hypertension     Prostate cancer Father      Cancer Father      mouth and throat     Cancer Sister      kidney     Cancer Daughter      skin     Cancer Daughter      skin         Allergies:  Allergies   Allergen Reactions     Levaquin [Levofloxacin] Itching and Rash     Pt developed itching and redness of forearm with iv administration, resolved with d/c of infusion.     Medications:  Current Outpatient Prescriptions   Medication Sig Dispense Refill     ASPIRIN (ASPIR-81 ORAL) Take 81 mg by mouth daily.        cholecalciferol, vitamin D3, 1,000 unit tablet Take 1 tablet (1,000 Units total) by mouth daily.  0     FENOFIBRATE ORAL Take 160 mg by mouth daily.        ferrous sulfate 325 (65 FE) MG tablet Take 2 tablets (650 mg total) by mouth daily with breakfast.  0     hydroCHLOROthiazide (MICROZIDE) 12.5 mg capsule TK ONE C PO  D  2     leuprolide, 6 month, (LUPRON DEPOT, 6 MONTH,) 45 mg SyKt Inject 45 mg into the shoulder, thigh, or buttocks every 6 (six) months.       losartan (COZAAR) 50 MG tablet Take 1 tablet (50 mg total) by mouth daily. (Patient taking differently: Take 100 mg by mouth daily. ) 90 tablet 0     metoprolol succinate (TOPROL-XL) 50 MG 24 hr tablet TAKE 1 TABLET ONCE DAILY BY MOUTH       multivitamin therapeutic (THERAGRAN) tablet Take 1 tablet by mouth daily.       pravastatin (PRAVACHOL) 80 MG tablet Take 1 tablet by mouth bedtime.  1     warfarin (COUMADIN) 2.5 MG tablet Take 2.5 mg daily 90 tablet 1     No current facility-administered medications for this visit.        Objective:   Wt Readings from  "Last 3 Encounters:   12/08/17 144 lb (65.3 kg)   12/02/16 148 lb (67.1 kg)   05/18/16 148 lb (67.1 kg)     Vital signs:  /74 (Patient Site: Right Arm, Patient Position: Sitting, Cuff Size: Adult Regular)  Pulse 60  Resp 18  Ht 5' 7.5\" (1.715 m)  Wt 144 lb (65.3 kg) Comment: with shoes  BMI 22.22 kg/m2      Physical Exam:    GENERAL APPEARANCE: Alert, cooperative and in no acute distress.  Slow, deliberate speech  HEENT: Conjunctivae not injected.  No scleral icterus. No Xanthelasma. Oral mucous membranes pink and moist.  NECK: No JVD.  No Hepatojugular reflux. Thyroid not enlarged.  CHEST: clear to auscultation  CARDIOVASCULAR: Irregularly irregular S1 and S2.   1/6 systolic murmur at the upper right sternal border.   No diastolic murmur audible.  3 out of 6 musical blowing holosystolic murmur audible at the cardiac apex radiating to the axilla.  Brachial, radial and posterior tibial pulses are intact and symetric. No carotid bruits noted.  ABDOMEN: Nontender. BS+. No bruits.  EXTREMITIES: Trace bilateral ankle edema.  Small varicosity right lower extremity        Lab Results:  LIPIDS:  Lab Results   Component Value Date    CHOL 131 11/07/2017    CHOL 108 07/25/2017    CHOL 142 02/20/2017     Lab Results   Component Value Date    HDL 47 11/07/2017    HDL 44 07/25/2017    HDL 45 02/20/2017     Lab Results   Component Value Date    LDLCALC 74 11/07/2017    LDLCALC 51 07/25/2017    LDLCALC 81 02/20/2017     Lab Results   Component Value Date    TRIG 51 11/07/2017    TRIG 64 07/25/2017    TRIG 81 02/20/2017       Echocardiogram 1/2016:  Summary  Overall left ventricular systolic function is mildly depressed.  Left ventricular ejection fraction is visually estimated to be 45-50%.  Moderate to severe posterior wall hypokinesis  Moderate to severe mitral regurgitation is present.  Mild to moderate aortic regurgitation.  Since 6/21/2013 the mitral regurgitation has increased    Echocardiogram 11/2016:  " Summary  Mildly dilated left ventricle.  Global hypokinesis of the left ventricle with mild impairment of systolic function.  Not all wall segments were well visualized.  Left ventricular ejection fraction is visually estimated to be 45%.  Moderate biatrial enlargement.  Patent foramen ovale with left-to-right shunt was visualized.  Interatrial septum bows to the right, suggesting increased left atrial pressures.  Moderate dilation of the aortic root.  Mild aortic regurgitation.  Moderate mitral regurgitation.  This study was compared with a previously done echocardiogram 1/28/16. The results are similar.            TAYLA SCOTT MD [facility]  [phone]

## 2021-06-14 NOTE — TELEPHONE ENCOUNTER
ANTICOAGULATION  MANAGEMENT- Home Care/Care Facility Result    Assessment     Today's INR result of 2.4 is Therapeutic (goal INR of 2.0-3.0)        Warfarin taken as previously instructed    No new diet changes affecting INR    No new medication/supplements affecting INR    Continues to tolerate warfarin with no reported s/s of bleeding or thromboembolism     Previous INR was Therapeutic     Will fax INR result to [facility] in [place]     Plan:     Spoke with Home Care Nurse Tommy discussed INR result and instructed:     Warfarin Dosing Instructions: Continue current warfarin dose    5 mg every Wed; 2.5 mg all other days     (0 % change)    Next INR to be drawn: 4 weeks  ACN called and spoke Marlyn RN nurse at [facility]. Living  at she stated that Home Care discharged the patient today and she sent an update to their house provider ( [facility]) for anticoagulation management moving forward.      Education provided: importance of therapeutic range    Tommy verbalizes understanding and agrees to warfarin dosing plan.   ?   Sharonda Dumas RN    Subjective/Objective:      Antonio White, a 85 y.o. male is established on warfarin.     Home care/care facility RN's report of Antonio INR, recent warfarin dosing, diet changes, medication changes, and symptoms is documented below.    Additional findings: none    Anticoagulation Episode Summary     Current INR goal:  2.0-3.0   TTR:  66.6 % (11.9 mo)   Next INR check:  2/2/2021   INR from last check:  2.40 (1/5/2021)   Weekly max warfarin dose:     Target end date:     INR check location:     Preferred lab:     Send INR reminders to:  [facility]    Indications    Chronic atrial fibrillation (H) [I48.20]           Comments:  chronic           Anticoagulation Care Providers     Provider Role Specialty Phone number    Joey Garcia MD Referring Cardiology [phone]

## 2021-06-14 NOTE — TELEPHONE ENCOUNTER
ANTICOAGULATION  MANAGEMENT PROGRAM    Antonio White is being discharged from the [facility] Anticoagulation Management Program (ACM).    Reason for discharge: care has been transferred to [facility] ( House Provider for [facility])    ACM referral closed, anticoagulation episode resolved and INR standing order discontinued.         Sharonda Dumas RN

## 2021-06-15 PROBLEM — M81.0 OSTEOPOROSIS: Status: ACTIVE | Noted: 2017-01-12

## 2021-06-15 PROBLEM — D64.9 ANEMIA: Status: ACTIVE | Noted: 2017-01-12

## 2021-06-15 NOTE — ANESTHESIA PREPROCEDURE EVALUATION
"Anesthesia Evaluation      Patient summary reviewed   No history of anesthetic complications     Airway   Mallampati: I  Neck ROM: full   Pulmonary - negative ROS and normal exam   (-) sleep apnea                         Cardiovascular   Exercise tolerance: > or = 4 METS  (+) hypertension, valvular problems/murmurs AI, MR and MVP, past MI, CAD, CABG/stent, dysrhythmias, , hypercholesterolemia, PVD     murmur Location:upper left sternal borderupper right sternal border      Neuro/Psych - negative ROS     Endo/Other - negative ROS      GI/Hepatic/Renal    (+) GERD well controlled,     (-) renal disease     Other findings: Per wife there is suspicion of NORMA, but pt has not had a sleep study as he is not interested in using CPAP, wife says he \"leaps out of bed, full of energy\" in the am. Had MI June 2013 from embolus, thrombectomy, stent?  H/o unsteady gait.  Nephrolithiasis.  Chronic a-fib, on coumadin, last dose pm of 2/5. Ascending aortic aneurysm, being followed.  Denies GERD, is in chart, not on meds for it.  H/o malignant melanoma on chest.  Prostate Ca.  Mild AI, moderate MR, EF 45-50%.  Hg 11.9, K 3.8, Cr 0.99, Ca++ 11.0, last INR 2.3, getting one today.      Addendum:  Pt told me he had nothing to eat or drink, but later told nurse he had toast and jelly at 0830 as he thought he could.  Surgeon is cancelling case.      Dental - normal exam                        Anesthesia Plan  Planned anesthetic: general LMA    ASA 3   Induction: intravenous   Anesthetic plan and risks discussed with: patient    Post-op plan: routine recovery          "

## 2021-06-16 PROBLEM — G47.33 OBSTRUCTIVE SLEEP APNEA: Status: ACTIVE | Noted: 2020-08-12

## 2021-06-16 PROBLEM — J18.9 PNEUMONIA: Status: ACTIVE | Noted: 2020-08-12

## 2021-06-16 PROBLEM — Z91.89 AT HIGH RISK FOR PRESSURE INJURY OF SKIN: Status: ACTIVE | Noted: 2020-08-24

## 2021-06-16 PROBLEM — E46 PROTEIN CALORIE MALNUTRITION (H): Status: ACTIVE | Noted: 2020-08-20

## 2021-06-16 PROBLEM — D62 ACUTE BLOOD LOSS ANEMIA: Status: ACTIVE | Noted: 2020-08-20

## 2021-06-16 PROBLEM — I95.9 HYPOTENSION, UNSPECIFIED HYPOTENSION TYPE: Status: ACTIVE | Noted: 2020-08-14

## 2021-06-16 PROBLEM — M79.671 PAIN OF RIGHT HEEL: Status: ACTIVE | Noted: 2020-08-24

## 2021-06-16 PROBLEM — E78.5 HYPERLIPIDEMIA: Status: ACTIVE | Noted: 2020-08-12

## 2021-06-16 PROBLEM — K92.2 LOWER GI BLEED: Status: ACTIVE | Noted: 2020-08-12

## 2021-06-16 NOTE — ANESTHESIA PREPROCEDURE EVALUATION
Anesthesia Evaluation      Patient summary reviewed   No history of anesthetic complications     Airway   Mallampati: I  Neck ROM: full   Pulmonary - negative ROS and normal exam   (-) sleep apnea (NORMA risk factors)                         Cardiovascular   Exercise tolerance: > or = 4 METS  (+) hypertension, valvular problems/murmurs, past MI, CAD, CABG/stent, dysrhythmias, , hypercholesterolemia, PVD    Rhythm: regular  Rate: normal,         Neuro/Psych - negative ROS     Endo/Other - negative ROS      GI/Hepatic/Renal    (+) GERD,     (-) renal disease     Other findings: Last dose of coumadin on 2/5.      Dental - normal exam                          Anesthesia Plan  Planned anesthetic: general LMA    ASA 3   Induction: intravenous   Anesthetic plan and risks discussed with: patient    Post-op plan: routine recovery

## 2021-06-16 NOTE — ANESTHESIA CARE TRANSFER NOTE
Last vitals:   Vitals:    02/12/18 1649   BP: 133/66   Pulse: 74   Resp: 14   Temp: 36.8  C (98.3  F)   SpO2: 100%     Patient's level of consciousness is drowsy  Spontaneous respirations: yes  Maintains airway independently: yes  Dentition unchanged: yes  Oropharynx: oropharynx clear of all foreign objects    QCDR Measures:  ASA# 20 - Surgical Safety Checklist: WHO surgical safety checklist completed prior to induction  PQRS# 430 - Adult PONV Prevention: 4558F - Pt received => 2 anti-emetic agents (different classes) preop & intraop  ASA# 8 - Peds PONV Prevention: NA - Not pediatric patient, not GA or 2 or more risk factors NOT present  PQRS# 424 - Zoie-op Temp Management: 4559F - At least one body temp DOCUMENTED => 35.5C or 95.9F within required timeframe  PQRS# 426 - PACU Transfer Protocol: - Transfer of care checklist used  ASA# 14 - Acute Post-op Pain: ASA14B - Patient did NOT experience pain >= 7 out of 10

## 2021-06-16 NOTE — ANESTHESIA POSTPROCEDURE EVALUATION
Patient: Antonio White  CYSTOSCOPY, BILATERAL URETEROSCOPY, LASER LITHOTRIPSY STONE EXTRACTION, URETERAL STENT PLACEMENT, CYSTOSCOPY, WITH RETROGRADE PYELOGRAM  Anesthesia type: general    Patient location: PACU  Last vitals:   Vitals:    02/12/18 1730   BP: 122/56   Pulse: 74   Resp: 23   Temp:    SpO2: 91%     Post vital signs: stable  Level of consciousness: awake and responds to simple questions  Post-anesthesia pain: pain controlled  Post-anesthesia nausea and vomiting: no  Pulmonary: unassisted, return to baseline  Cardiovascular: stable and blood pressure at baseline  Hydration: adequate  Anesthetic events: no    QCDR Measures:  ASA# 11 - Ozie-op Cardiac Arrest: ASA11B - Patient did NOT experience unanticipated cardiac arrest  ASA# 12 - Zoie-op Mortality Rate: ASA12B - Patient did NOT die  ASA# 13 - PACU Re-Intubation Rate: ASA13B - Patient did NOT require a new airway mgmt  ASA# 10 - Composite Anes Safety: ASA10A - No serious adverse event    Additional Notes:

## 2021-06-16 NOTE — ANESTHESIA CARE TRANSFER NOTE
Last vitals:   Vitals:    03/05/18 1742   BP: 120/73   Pulse: 87   Resp:    Temp: 37.7  C (99.8  F)   SpO2: 100%     Patient's level of consciousness is drowsy  Spontaneous respirations: yes  Maintains airway independently: yes  Dentition unchanged: yes  Oropharynx: oropharynx clear of all foreign objects    QCDR Measures:  ASA# 20 - Surgical Safety Checklist: WHO surgical safety checklist completed prior to induction  PQRS# 430 - Adult PONV Prevention: 4558F - Pt received => 2 anti-emetic agents (different classes) preop & intraop  ASA# 8 - Peds PONV Prevention: NA - Not pediatric patient, not GA or 2 or more risk factors NOT present  PQRS# 424 - Zoie-op Temp Management: 4559F - At least one body temp DOCUMENTED => 35.5C or 95.9F within required timeframe  PQRS# 426 - PACU Transfer Protocol: - Transfer of care checklist used  ASA# 14 - Acute Post-op Pain: ASA14B - Patient did NOT experience pain >= 7 out of 10

## 2021-06-16 NOTE — PROGRESS NOTES
inr done Monday at a procedure. Will restart today and then stop again on 2/22 for procedure 3/1. Will stop Aspirin on 2/22 also. Will send dosing calendar to home. Went over instructions several times with pt and wife on the phone. Will retest on 3/6 inr.

## 2021-06-16 NOTE — PROGRESS NOTES
INR 2.2 off Warfarin for procedure. Will increase to 5 mg thur, fri and Sat and then 2.5 mg all other days. Retest in one week. After talking with pt and discussing history of greens/salads and medication change. Pt will  continue  with current diet and dosing of Warfarin.  Continue with moderation of Vit K and green leafy vegetables. Cautioned to call with increase bruising or bleeding. Reminded to call with medication change especially antibiotic. Call with any questions or concerns or any up coming procedures. Cautioned about using Herbal medication.

## 2021-06-16 NOTE — ANESTHESIA POSTPROCEDURE EVALUATION
Patient: Antonio White  CYSTOSCOPY, BILATERAL URETEROSCOPY, LASER LITHOTRIPSY, BASKET EXTRACTION, BILATERAL URETERAL STENT EXCHANGE  Anesthesia type: general    Patient location: Phase II Recovery  Last vitals:   Vitals:    03/05/18 1820   BP: 126/60   Pulse: 74   Resp: 16   Temp:    SpO2: 98%     Post vital signs: stable  Level of consciousness: awake, alert and oriented  Post-anesthesia pain: pain controlled  Post-anesthesia nausea and vomiting: no  Pulmonary: unassisted, return to baseline  Cardiovascular: stable and blood pressure at baseline  Hydration: adequate  Anesthetic events: no    QCDR Measures:  ASA# 11 - Zoie-op Cardiac Arrest: ASA11B - Patient did NOT experience unanticipated cardiac arrest  ASA# 12 - Zoie-op Mortality Rate: ASA12B - Patient did NOT die  ASA# 13 - PACU Re-Intubation Rate: ASA13B - Patient did NOT require a new airway mgmt  ASA# 10 - Composite Anes Safety: ASA10A - No serious adverse event    Additional Notes:

## 2021-06-16 NOTE — ANESTHESIA PREPROCEDURE EVALUATION
Anesthesia Evaluation      Patient summary reviewed   No history of anesthetic complications     Airway   Mallampati: II  Neck ROM: limited   Pulmonary - negative ROS and normal exam   (+) sleep apnea (NORMA risk factors) on no CPAP, ,                          Cardiovascular   Exercise tolerance: > or = 4 METS  (+) hypertension, valvular problems/murmurs (Mitral Regurgitation) MR and AI, past MI (Thromboembolic MI in 2013. On anticoagulation.), CAD, CABG/stent, dysrhythmias (A-Fib), , hypercholesterolemia, PVD  Received beta blockers in last 24 hours prior to incision.  ,     ROS comment: Cardiac echo 2-12-18:       Summary   Mildly dilated left ventricle.   Global hypokinesis of the left ventricle with mild impairment of systolic   function.   Not all wall segments were well visualized.   Left ventricular ejection fraction is visually estimated to be 45%.   Moderate biatrial enlargement.   Patent foramen ovale with left-to-right shunt was visualized.   Interatrial septum bows to the right, suggesting increased left atrial   pressures.   Moderate dilation of the aortic root.   Mild aortic regurgitation.   Moderate mitral regurgitation.   This study was compared with a previously done echocardiogram 1/28/16. The   results are similar.  PE comment: Irregular,     Neuro/Psych    (-) no seizures, no CVA    Comments: Hearing loss.    Endo/Other - negative ROS      GI/Hepatic/Renal    (+) GERD (Stable),   chronic renal disease (CKD Stage 3),     Comments: Prostate Ca     Other findings:     Ascending Aortic Aneurysm      Dental    (+) caps    Comment: Top right front tooth is capped.                       Anesthesia Plan  Planned anesthetic: general LMA    Phenylephrine infusion  Decadron    ASA 4   Induction: intravenous   Anesthetic plan and risks discussed with: patient and spouse  Anesthesia plan special considerations: antiemetics,   Post-op plan: routine recovery

## 2021-06-16 NOTE — PROGRESS NOTES
INR 2.3 done with kidney stone and stent extractions. Doing better. Continue current management dosing of Warfarin. Continue  diet of moderate Vitamin K intake. Discussed with pt the need to call with questions or concerns or any change in medication especially herbal medication or OTC. Call with increased bleeding or bruising or any upcoming procedures.  Retest in 2 weeks. After talking with pt and discussing history of greens/salads and medication change. Pt will  continue  with current diet and dosing of Warfarin.  Continue with moderation of Vit K and green leafy vegetables. Cautioned to call with increase bruising or bleeding. Reminded to call with medication change especially antibiotic. Call with any questions or concerns or any up coming procedures. Cautioned about using Herbal medication.

## 2021-06-17 NOTE — TELEPHONE ENCOUNTER
Telephone Encounter by Malinda Pimentel RN at 10/13/2020  5:01 PM     Author: Malinda Pimentel RN Service: -- Author Type: Registered Nurse    Filed: 10/13/2020  5:03 PM Encounter Date: 10/13/2020 Status: Signed    : Malinda Pimentel RN (Registered Nurse)       ANTICOAGULATION  MANAGEMENT- Home Care/Care Facility Result    Assessment     Today's INR result of 1.2 is Subtherapeutic (goal INR of 2.0-3.0)        Missed dose(s) may be affecting INR    No new diet changes affecting INR    No new medication/supplements affecting INR    Continues to tolerate warfarin with no reported s/s of bleeding or thromboembolism     Previous INR was Therapeutic    Plan:     Spoke with Suzanne home care nurse discussed INR result and instructed:     Warfarin Dosing Instructions: Take 5 mg tonight (if possible, otherwise Wednesday) then continue current warfarin dose 2.5 mg daily  (0 % change)    Next INR to be drawn: 1 week    Education provided: target INR goal and significance of current INR result and importance of taking warfarin as instructed    Suzanne verbalizes understanding and agrees to warfarin dosing plan.   ?   Malinda Pimentel RN    Subjective/Objective:      Antonio White, a 85 y.o. male is established on warfarin.     Home care/care facility RN's report of Antonio INR, recent warfarin dosing, diet changes, medication changes, and symptoms is documented below.    Additional findings: none    Anticoagulation Episode Summary     Current INR goal:  2.0-3.0   TTR:  69.4 % (11.9 mo)   Next INR check:  10/20/2020   INR from last check:  1.20 (10/13/2020)   Weekly max warfarin dose:     Target end date:     INR check location:     Preferred lab:     Send INR reminders to:  [facility]    Indications    Chronic atrial fibrillation (H) [I48.20]           Comments:  chronic           Anticoagulation Care Providers     Provider Role Specialty Phone number    Joey Garcia MD Referring Cardiology  [phone]

## 2021-06-17 NOTE — PROGRESS NOTES
INR 3.2 decrease dose to 2.5 mg daily retest in 2 weeks. After talking with pt and discussing history of greens/salads and medication change. Pt will  continue  with current diet and dosing of Warfarin.  Continue with moderation of Vit K and green leafy vegetables. Cautioned to call with increase bruising or bleeding. Reminded to call with medication change especially antibiotic. Call with any questions or concerns or any up coming procedures. Cautioned about using Herbal medication.

## 2021-06-17 NOTE — PROGRESS NOTES
INR 1.7 take 5 mg today then 2.5 mg all other days. Retest in one week. After talking with pt and discussing history of greens/salads and medication change. Pt will  continue  with current diet and dosing of Warfarin.  Continue with moderation of Vit K and green leafy vegetables. Cautioned to call with increase bruising or bleeding. Reminded to call with medication change especially antibiotic. Call with any questions or concerns or any up coming procedures. Cautioned about using Herbal medication.

## 2021-06-17 NOTE — TELEPHONE ENCOUNTER
Telephone Encounter by Stacy Chahal RN at 10/20/2020 11:33 AM     Author: Stacy Chahal RN Service: -- Author Type: Registered Nurse    Filed: 10/20/2020 11:35 AM Encounter Date: 10/20/2020 Status: Signed    : Stacy Chahal RN (Registered Nurse)       ANTICOAGULATION  MANAGEMENT- Home Care/Care Facility Result    Assessment     Today's INR result of 1.6 is Subtherapeutic (goal INR of 2.0-3.0)        Warfarin taken as previously instructed    No new diet changes affecting INR    No new medication/supplements affecting INR    Continues to tolerate warfarin with no reported s/s of bleeding or thromboembolism     Previous INR was Subtherapeutic    Plan:     Spoke with home care nurse Jamiediscussed INR result and instructed:     Warfarin Dosing Instructions: Change warfarin dose to 5 mg daily on wed; and 2.5 mg daily rest of week  (14 % change)    Next INR to be drawn: one week with home care      Otilio verbalizes understanding and agrees to warfarin dosing plan.   ?   Stacy Chahal RN    Subjective/Objective:      Antonio White, a 85 y.o. male is established on warfarin.     Home care/care facility RN's report of Antonio INR, recent warfarin dosing, diet changes, medication changes, and symptoms is documented below.    Additional findings: none    Anticoagulation Episode Summary     Current INR goal:  2.0-3.0   TTR:  67.5 % (11.9 mo)   Next INR check:  10/27/2020   INR from last check:  1.60 (10/20/2020)   Weekly max warfarin dose:     Target end date:     INR check location:     Preferred lab:     Send INR reminders to:  [facility]    Indications    Chronic atrial fibrillation (H) [I48.20]           Comments:  chronic           Anticoagulation Care Providers     Provider Role Specialty Phone number    Joey Garcia MD Referring Cardiology [phone]

## 2021-06-18 NOTE — PROGRESS NOTES
Pt INR 2.4 pt states he has a blood clot and just finished an ulta sound. Will continue on 5 mg tue and Sat and 2.5 mg all other days. Retest in 2 weeks.

## 2021-06-18 NOTE — PROGRESS NOTES
INR 3.8 will hold today's dose and then 5 mg sat. 2.5 mg all other days. After talking with pt and discussing history of greens/salads and medication change. Pt will  continue  with current diet and dosing of Warfarin.  Continue with moderation of Vit K and green leafy vegetables. Cautioned to call with increase bruising or bleeding. Reminded to call with medication change especially antibiotic. Call with any questions or concerns or any up coming procedures. Cautioned about using Herbal medication.

## 2021-06-18 NOTE — PROGRESS NOTES
Anesthesiologist present for case.  See Anesthesia Record for details regarding sedation/anesthesia, medications, and vital signs.   Reviewed with pt no changes in Coumadin dose, pt denies any s/s of bleeding, pt has no further questions at this time, pt has clinics contact information for further concerns/questions, pt was instructed when to obtain next INR appointment and verbalized understanding

## 2021-06-18 NOTE — PROGRESS NOTES
INR 4.5 will hold Warfarin for Tue and Wed and 2.5 mg daily until 7/3. Retest in one week. After talking with pt and discussing history of greens/salads and medication change. Pt will  continue  with current diet and dosing of Warfarin.  Continue with moderation of Vit K and green leafy vegetables. Cautioned to call with increase bruising or bleeding. Reminded to call with medication change especially antibiotic. Call with any questions or concerns or any up coming procedures. Cautioned about using Herbal medication.

## 2021-06-19 NOTE — PROGRESS NOTES
INR 2.5 take 2.5 mg daily and retest in 2 weeks. After talking with pt and discussing history of greens/salads and medication change. Pt will  continue  with current diet and dosing of Warfarin.  Continue with moderation of Vit K and green leafy vegetables. Cautioned to call with increase bruising or bleeding. Reminded to call with medication change especially antibiotic. Call with any questions or concerns or any up coming procedures. Cautioned about using Herbal medication.

## 2021-06-19 NOTE — PROGRESS NOTES
INR 3.5 decrease dose to 1.25 mg tue, thur and sun and 2.5 mg all other days. After talking with pt and discussing history of greens/salads and medication change. Pt will  continue  with current diet and dosing of Warfarin.  Continue with moderation of Vit K and green leafy vegetables. Cautioned to call with increase bruising or bleeding. Reminded to call with medication change especially antibiotic. Call with any questions or concerns or any up coming procedures. Cautioned about using Herbal medication.

## 2021-06-19 NOTE — PROGRESS NOTES
INR 1.4 increase dose to 1.25 sun and thur and 2.5 mg all other days. After talking with pt and discussing history of greens/salads and medication change. Pt will  continue  with current diet and dosing of Warfarin.  Continue with moderation of Vit K and green leafy vegetables. Cautioned to call with increase bruising or bleeding. Reminded to call with medication change especially antibiotic. Call with any questions or concerns or any up coming procedures. Cautioned about using Herbal medication.

## 2021-06-19 NOTE — PROGRESS NOTES
INR 3.0 decrease dose to 2.5 mg M,W,F and 1.25 mg all other days. Retest in 2 weeks. After talking with pt and discussing history of greens/salads and medication change. Pt will  continue  with current diet and dosing of Warfarin.  Continue with moderation of Vit K and green leafy vegetables. Cautioned to call with increase bruising or bleeding. Reminded to call with medication change especially antibiotic. Call with any questions or concerns or any up coming procedures. Cautioned about using Herbal medication.

## 2021-06-19 NOTE — PROGRESS NOTES
INR 1.5 increase dose to 2.5 mg daily Continue current management dosing of Warfarin. Continue  diet of moderate Vitamin K intake. Discussed with pt the need to call with questions or concerns or any change in medication especially herbal medication or OTC. Call with increased bleeding or bruising or any upcoming procedures.

## 2021-06-20 ENCOUNTER — RECORDS - HEALTHEAST (OUTPATIENT)
Dept: LAB | Facility: CLINIC | Age: 86
End: 2021-06-20

## 2021-06-20 ENCOUNTER — HEALTH MAINTENANCE LETTER (OUTPATIENT)
Age: 86
End: 2021-06-20

## 2021-06-20 NOTE — LETTER
Letter by Nimisha Maldonado CNP at      Author: Nimisha Maldonado CNP Service: -- Author Type: --    Filed:  Encounter Date: 9/8/2020 Status: (Other)         Patient: Antonio White   MR Number: [id number]   YOB: 1935   Date of Visit: 9/8/2020     [facility]    Facility:   [facility] [[id number]]   Code Status: FULL CODE  PCP: Kyrie Lucero MD   Phone: [phone]   Fax: [phone]      CHIEF COMPLAINT/REASON FOR VISIT:  Chief Complaint   Patient presents with   ? Discharge Summary       HISTORY COURSE:  Antonio is a 85 y.o. male undergoing physical and occupational therapy at [facility]. He is with past medical history  of atrial fibrillation on Coumadin, CKD 3, NORMA, hyperlipidemia, hypertension.  He presented to the ER on 8/12 for evaluation of confusion and recurrent falls. He was noted to have a lower GI bleed. He underwent a colonoscopy on 8/14 showing large transverse polyp, sigmoid diverticulum and large internal hemorroids, no active bleeding. His ASA was held and coumadin resumed. He was found to have a LLL infiltrate and received  Rocephin and azithromycin x 48hrs, stopped abx August 17, 2020  -procalcitonin normal  -covid-19 negative   His encephalopathy ws thought to be related to hospital acquired delirium and multifactorial.      Today he is seen for a face to face for discharge.   He will discharge to [facility] on 9/9/20 with current medications and treatments . He will have home care services PT/OT/HHA and RN. He reports his back pain is much better since scheduling his Tylenol. He denies any bleeding.  His HGB   is up to 9.2 from 8.4.  He denies fever, chills,Denies any chest pain,headaches,lightheadedness, dizziness,   shortness of breath, or cough. Appetite is fair. Denies any GERD symptoms.   Denies any difficulty with swallowing,Denies any abdominal pain, constipation or loose stools. No insomnia. No active bleeding.       Review of Systems  Constitutional: Negative for activity change, appetite change, chills, fatigue and fever.   HENT: Negative for congestion and sore throat.    Eyes: Negative for visual disturbance.   Respiratory: Negative for cough, shortness of breath and wheezing.    Cardiovascular: Negative for chest pain and leg swelling.   Gastrointestinal: Negative for abdominal distention, abdominal pain, constipation, diarrhea and nausea.   Genitourinary: Negative for dysuria.   Musculoskeletal: Positive for back pain. Negative for arthralgias and myalgias.   Skin: Negative for color change, rash and wound.   Neurological: Positive for weakness. Negative for dizziness and numbness.   Psychiatric/Behavioral: Negative for agitation, behavioral problems and sleep disturbance.   Vitals:    09/08/20 1255   BP: 130/72   Pulse: 66   Resp: 16   Temp: 97.4  F (36.3  C)   SpO2: 95%   Weight: 126 lb 12.8 oz (57.5 kg)       Physical Exam  Constitutional:       Appearance: He is well-developed.      Comments: Pleasant gentleman in on acute distress    HENT:      Head: Normocephalic.   Eyes:      Conjunctiva/sclera: Conjunctivae normal.   Neck:      Musculoskeletal: Normal range of motion.   Cardiovascular:      Rate and Rhythm: Normal rate and regular rhythm.      Heart sounds: Normal heart sounds. No murmur.   Pulmonary:      Effort: No respiratory distress.      Breath sounds: Normal breath sounds. No wheezing or rales.   Abdominal:      General: Bowel sounds are normal. There is no distension.      Palpations: Abdomen is soft.      Tenderness: There is no abdominal tenderness.   Musculoskeletal: Normal range of motion.   Skin:     General: Skin is warm.   Neurological:      Mental Status: He is alert and oriented to person, place, and time.   Psychiatric:         Behavior: Behavior normal.   MEDICATION LIST:  Current Outpatient Medications   Medication Sig   ? acetaminophen (TYLENOL) 500 MG tablet Take 1-2 tablets (500-1,000 mg  total) by mouth every 4 (four) hours as needed. (Patient taking differently: Take 1,000 mg by mouth 3 (three) times a day. And daily PRN)   ? FERROUS SULFATE (SLOW FE ORAL) Take 45 mg by mouth daily.    ? leuprolide, 6 month, (LUPRON DEPOT, 6 MONTH,) 45 mg SyKt Inject 45 mg into the shoulder, thigh, or buttocks every 6 (six) months.   ? lidocaine 4 % patch Place 1 patch on the skin daily. Remove and discard patch with 12 hours or as directed by MD.   ? metoprolol tartrate (LOPRESSOR) 25 MG tablet Take 0.5 tablets (12.5 mg total) by mouth daily.   ? multivitamin therapeutic (THERAGRAN) tablet Take 1 tablet by mouth daily.   ? polyethylene glycol (MIRALAX) 17 gram packet Take 1 packet (17 g total) by mouth daily as needed.   ? pravastatin (PRAVACHOL) 80 MG tablet Take 1 tablet by mouth bedtime.   ? warfarin sodium (WARFARIN ORAL) Take by mouth. 8/31/20 INR 1.68  Take 2.5mg daily.  Next INR 9/8/20.    8/27/20 INR 2.08  Cont 1.25mg M-W-F and 2.5mg AOD.  Next INR 8/31/20.    8/24/20 INR 2.22 Cont 1.25mg MWF, 2.5mg AOD. Next INR 8/27.  8/20/20 INR 2.17 Cont 1.25mg MWF, 2.5mg AOD. Next INR 8/24 8/18/20 INR 1.82 1.25mg MWF, 2.5mg AOD.  1.25 mg every Mon, Wed, Fri; 2.5 mg all other days.  Adjust dose based on INR results as directed.       DISCHARGE DIAGNOSIS:    ICD-10-CM    1. Lower GI bleed  K92.2    2. Benign Essential Hypertension  I10    3. Chronic low back pain, unspecified back pain laterality, unspecified whether sciatica present  M54.5     G89.29        MEDICAL EQUIPMENT NEEDS:  None     DISCHARGE PLAN/FACE TO FACE:  I certify that services are/were furnished while this patient was under the care of a physician and that a physician or an allowed non-physician practitioner (NPP), had a face-to-face encounter that meets the physician face-to-face encounter requirements. The encounter was in whole, or in part, related to the primary reason for home health. The patient is confined to his/her home and needs  intermittent skilled nursing, physical therapy, speech-language pathology, or the continued need for occupational therapy. A plan of care has been established by a physician and is periodically reviewed by a physician.  Date of Face-to-Face Encounter: 9/8/20    I certify that, based on my findings, the following services are medically necessary home health services: PT/OT/HHA and RN    My clinical findings support the need for the above skilled services because:PT/OT for continued strength and endurance, HHA to assist with ADl's and RN for VS and medication management.     This patient is homebound because: He is deconditioned and easily fatigued following a GI bleed.     The patient is, or has been, under my care and I have initiated the establishment of the plan of care. This patient will be followed by a physician who will periodically review the plan of care.    Schedule follow up visit with primary care provider within 7 days to reestablish care.    Electronically signed by: Nimisha Maldonado CNP

## 2021-06-20 NOTE — LETTER
Letter by Francheska Messina NP at      Author: Francheska Messina NP Service: -- Author Type: --    Filed:  Encounter Date: 2020 Status: (Other)         Patient: Antonio White   MR Number: [id number]   YOB: 1935   Date of Visit: 2020                 [facility]    DATE: 2020    NAME:  Antonio White             :  1935  MRN: [id number]  CODE STATUS:  FULL CODE    VISIT TYPE: Review Of Multiple Medical Conditions (anemia, gi bleed)     FACILITY:  [facility] [[id number]]       CHIEF COMPLAIN/REASON FOR VISIT:    Chief Complaint   Patient presents with   ? Review Of Multiple Medical Conditions     anemia, gi bleed               HISTORY OF PRESENT ILLNESS: Antonio White is a 85 y.o. male who was admitted - for falls, confusion and found to have pneumonia and anemia. He underwent colonoscopy for lower GI bleeding and acute blood loss from hg 12.3-9.3. He was found to have large transverse polyp, sigmoid diverticulum, large hemorrhoids. His aspirin was held but coumadin eventually resumed and hemoglobin stable at 8 by discharge. He had LLL CAP and was treated with antibiotics. He had metabolic encephalopathy that improved by discharge. He was recommended follow up  With GI and tcu placement for rehab. He has PMH of frequent falls, A fib, HTN, HLD, CKd stage 3, NORMA. Prior to this he was living at home.     Today Mr. White is seen for review of systems for anemia, GI bleed. He says he has been doing fine lately. He does have some soreness in his left wrist and neck today. He denies noticing any blood in his stool. He thinks he is eating well and therapy is going fine. He says they work him too hard sometimes. He is not having any breathing issues or cough. No recent falls per staff report. His hemoglobin dropped again to 8.5 on .     REVIEW OF SYSTEMS:  PROBLEMS AND REVIEW OF SYSTEMS:   Today on ROS:   Currently, no fever, chills, or  rigors. Decreased vision and hearing. Denies any chest pain, headaches, palpitations, lightheadedness, dizziness, shortness of breath, or cough. Appetite is good. Denies any GERD symptoms. Denies any difficulty with swallowing, nausea, or vomiting.  Denies any abdominal pain, diarrhea or constipation. Denies any urinary symptoms. No insomnia.  Positive for weakness,  Left wrist pain, neck pain, unable to obtain further ROS due to confusion       Allergies   Allergen Reactions   ? Levaquin [Levofloxacin] Itching and Rash     Pt developed itching and redness of forearm with iv administration, resolved with d/c of infusion.     Current Outpatient Medications   Medication Sig   ? acetaminophen (TYLENOL) 500 MG tablet Take 1-2 tablets (500-1,000 mg total) by mouth every 4 (four) hours as needed. (Patient taking differently: Take 1,000 mg by mouth every 6 (six) hours as needed. )   ? FERROUS SULFATE (SLOW FE ORAL) Take 45 mg by mouth every other day.    ? leuprolide, 6 month, (LUPRON DEPOT, 6 MONTH,) 45 mg SyKt Inject 45 mg into the shoulder, thigh, or buttocks every 6 (six) months.   ? lidocaine 4 % patch Place 1 patch on the skin daily. Remove and discard patch with 12 hours or as directed by MD.   ? metoprolol tartrate (LOPRESSOR) 25 MG tablet Take 0.5 tablets (12.5 mg total) by mouth daily.   ? multivitamin therapeutic (THERAGRAN) tablet Take 1 tablet by mouth daily.   ? polyethylene glycol (MIRALAX) 17 gram packet Take 1 packet (17 g total) by mouth daily as needed.   ? pravastatin (PRAVACHOL) 80 MG tablet Take 1 tablet by mouth bedtime.   ? warfarin sodium (WARFARIN ORAL) Take by mouth. 8/27/20 INR 2.08  Cont 1.25mg M-W-F and 2.5mg AOD.  Next INR 8/31/20.    8/24/20 INR 2.22 Cont 1.25mg MWF, 2.5mg AOD. Next INR 8/27.  8/20/20 INR 2.17 Cont 1.25mg MWF, 2.5mg AOD. Next INR 8/24 8/18/20 INR 1.82 1.25mg MWF, 2.5mg AOD.  1.25 mg every Mon, Wed, Fri; 2.5 mg all other days.  Adjust dose based on INR results as directed.      Past Medical History:    Past Medical History:   Diagnosis Date   ? Anemia    ? Aortic regurgitation    ? Atrial fibrillation (H)    ? Cardiac murmur    ? Chronic kidney disease     stage 3   ? Coronary artery disease    ? GERD (gastroesophageal reflux disease)    ? Hearing loss    ? History of prostate cancer    ? Hyperlipidemia    ? Hypertension    ? Malignant melanoma (H)     left chest   ? Malignant melanoma (H)    ? MI (myocardial infarction) (H) 2013    x1   ? Mitral valve prolapse    ? Osteoporosis    ? Personal history of thromboembolic disease 3/6/2016    History thromboembolic MI in 2013; continue indefinite anticoagulation unless risks outweight benefits.   ? Prostate cancer (H)     had radioactive seed treatment   ? Sleep apnea     no CPAP           PHYSICAL EXAMINATION  Vitals:    08/28/20 0700   BP: 112/66   Pulse: 67   Resp: 12   Temp: 97.4  F (36.3  C)   SpO2: 96%   Weight: 122 lb (55.3 kg)       Today on physical exam:     GENERAL: Awake, Alert,  not in any form of acute distress, answers questions appropriately, follows simple commands, conversant, weakness  HEENT: Head is normocephalic with normal hair distribution. No evidence of trauma. Ears: No acute purulent discharge. Eyes: Conjunctivae pink with no scleral jaundice. Nose: Normal mucosa and septum. NECK: Supple with no cervical or supraclavicular lymphadenopathy. Trachea is midline. Decreased vision and hearing  CHEST: No tenderness or deformity, no crepitus  LUNG: dim to auscultation with good chest expansion. There are no crackles or wheezes, normal AP diameter. No shortness of breath or cough noted on exam  BACK: No kyphosis of the thoracic spine. Symmetric, no curvature, ROM normal, no CVA tenderness, no spinal tenderness   CVS: irregularly irregular rhythm, there are no murmurs, rubs, gallops, or heaves,  2+ pulses symmetric in all extremities.  ABDOMEN: Rounded and soft, nontender to palpation, non distended, no masses, no  organomegaly, good bowel sounds, no rebound or guarding, no peritoneal signs.   EXTREMITIES: No pedal edema, Left thigh extensive ecchymosis healing, scattered abrasions and ecchymosis on bilateral upper and lower extremities  SKIN: Warm and dry,   NEUROLOGICAL: The patient is oriented to person, place, not to time. Otherwise confused, forgetful            LABS:   Recent Results (from the past 168 hour(s))   INR   Result Value Ref Range    INR 2.22 (H) 0.90 - 1.10   Comprehensive Metabolic Panel   Result Value Ref Range    Sodium 141 136 - 145 mmol/L    Potassium 3.7 3.5 - 5.0 mmol/L    Chloride 112 (H) 98 - 107 mmol/L    CO2 21 (L) 22 - 31 mmol/L    Anion Gap, Calculation 8 5 - 18 mmol/L    Glucose 78 70 - 125 mg/dL    BUN 26 8 - 28 mg/dL    Creatinine 0.79 0.70 - 1.30 mg/dL    GFR MDRD Af Amer >60 >60 mL/min/1.73m2    GFR MDRD Non Af Amer >60 >60 mL/min/1.73m2    Bilirubin, Total 0.4 0.0 - 1.0 mg/dL    Calcium 10.3 8.5 - 10.5 mg/dL    Protein, Total 5.3 (L) 6.0 - 8.0 g/dL    Albumin 2.5 (L) 3.5 - 5.0 g/dL    Alkaline Phosphatase 55 45 - 120 U/L    AST 26 0 - 40 U/L    ALT 22 0 - 45 U/L   HM1 (CBC with Diff)   Result Value Ref Range    WBC 4.4 4.0 - 11.0 thou/uL    RBC 2.67 (L) 4.40 - 6.20 mill/uL    Hemoglobin 8.4 (L) 14.0 - 18.0 g/dL    Hematocrit 26.4 (L) 40.0 - 54.0 %    MCV 99 80 - 100 fL    MCH 31.5 27.0 - 34.0 pg    MCHC 31.8 (L) 32.0 - 36.0 g/dL    RDW 13.2 11.0 - 14.5 %    Platelets 266 140 - 440 thou/uL    MPV 9.9 8.5 - 12.5 fL    Neutrophils % 56 50 - 70 %    Lymphocytes % 24 20 - 40 %    Monocytes % 10 2 - 10 %    Eosinophils % 7 (H) 0 - 6 %    Basophils % 1 0 - 2 %    Immature Granulocyte % 2 (H) <=0 %    Neutrophils Absolute 2.5 2.0 - 7.7 thou/uL    Lymphocytes Absolute 1.1 0.8 - 4.4 thou/uL    Monocytes Absolute 0.5 0.0 - 0.9 thou/uL    Eosinophils Absolute 0.3 0.0 - 0.4 thou/uL    Basophils Absolute 0.0 0.0 - 0.2 thou/uL    Immature Granulocyte Absolute 0.1 (H) <=0.0 thou/uL   INR   Result Value Ref  Range    INR 2.08 (H) 0.90 - 1.10     Results for orders placed or performed during the hospital encounter of 10/30/19   Basic Metabolic Panel   Result Value Ref Range    Sodium 144 136 - 145 mmol/L    Potassium 4.6 3.5 - 5.0 mmol/L    Chloride 111 (H) 98 - 107 mmol/L    CO2 22 22 - 31 mmol/L    Anion Gap, Calculation 11 5 - 18 mmol/L    Glucose 145 (H) 70 - 125 mg/dL    Calcium 10.6 (H) 8.5 - 10.5 mg/dL    BUN 38 (H) 8 - 28 mg/dL    Creatinine 1.24 0.70 - 1.30 mg/dL    GFR MDRD Af Amer >60 >60 mL/min/1.73m2    GFR MDRD Non Af Amer 56 (L) >60 mL/min/1.73m2         Lab Results   Component Value Date    WBC 4.4 08/24/2020    HGB 8.4 (L) 08/24/2020    HCT 26.4 (L) 08/24/2020    MCV 99 08/24/2020     08/24/2020       Lab Results   Component Value Date    GXFFFIJR90 528 02/24/2015     No results found for: HGBA1C  Lab Results   Component Value Date    INR 2.08 (H) 08/27/2020    INR 2.22 (H) 08/24/2020    INR 2.17 (H) 08/20/2020     Vitamin D, Total (25-Hydroxy)   Date Value Ref Range Status   10/01/2019 50.1 30.0 - 80.0 ng/mL Final     Lab Results   Component Value Date    TSH 1.15 08/13/2020           ASSESSMENT/PLAN:    Lower GI Bleed, melena: Colonoscopy 8/14-large transverse polyp, sigmoid diverticulum, large hemorrhoids. Aspirin stopped, remains on warfarin. Still needs F/u  With GI. No n/v/abdominal pain. Tolerating diet. Hg 8.5 on 8/20, dropped from 9 in hospital.  hemoccult stool x 3-not been notified of positive results so assuming negative. Recheck on 8/24-hg remained at 8.5. No further signs of bleeding, denies stomach upset, nausea, pain, diarrhea, melena. Change iron to daily, recheck hm1 on 9/1. Needs follow up with GI.  Acute blood loss anemia: hg 12.3-9.3 during hospital course, hg 9 on 8/16.  Repeat on 8/24-hg 8.5.  Change iron to daily, hm1 on 9/1.   Atrial fibrillation: rate controlled in 60s. On warfarin, inr today. On metoprolol, added hold parameters.   HTN: SBP 110s. On metoprolol,  hold  parameter. Encourage fluids. Asymptomatic.   CKD stage 3: cmp ordered for 8/24-cr 0.79 on 8/24.   NORMA:  bring CPAP from home and use at bedtime.   HLD: on pravastatin. Aspirin stopped.   Constipation: miralax daily prn. No concerns.   Chronic back pain: on lidoderm patch, tylenol prn. Reports pain controlled today.   Protein calorie malnutrition: daily weights, dietary following. Concerns for failure to thrive at home, frequent falls. 118-122lbs.  Frequent Falls: discussed with nursing social service consult, fall prevention protocol. PT, OT following. No recent falls. Working with therapy.        Electronically signed by: Francheska Messina NP

## 2021-06-20 NOTE — LETTER
Letter by Francheska Messina NP at      Author: Francheska Messina NP Service: -- Author Type: --    Filed:  Encounter Date: 2020 Status: (Other)         Patient: Antonio White   MR Number: [id number]   YOB: 1935   Date of Visit: 2020                 [facility]    DATE: 2020    NAME:  Antonio White             :  1935  MRN: [id number]  CODE STATUS:  FULL CODE    VISIT TYPE: Problem Visit (heel pain)     FACILITY:  [facility] [[id number]]       CHIEF COMPLAIN/REASON FOR VISIT:    Chief Complaint   Patient presents with   ? Problem Visit     heel pain               HISTORY OF PRESENT ILLNESS: Antonio White is a 85 y.o. male who was admitted - for falls, confusion and found to have pneumonia and anemia. He underwent colonoscopy for lower GI bleeding and acute blood loss from hg 12.3-9.3. He was found to have large transverse polyp, sigmoid diverticulum, large hemorrhoids. His aspirin was held but coumadin eventually resumed and hemoglobin stable at 8 by discharge. He had LLL CAP and was treated with antibiotics. He had metabolic encephalopathy that improved by discharge. He was recommended follow up  With GI and tcu placement for rehab. He has PMH of frequent falls, A fib, HTN, HLD, CKd stage 3, NORMA. Prior to this he was living at home.     Today Mr. White is seen today for follow up on pneumonia, gi bleed. He is complaining of right heel pain today as well. He says he has been doing pretty good and not having any issues with his bowels. He has not noted any blood in his stools recently. He says he is eating fine and has not had any recent stomach upset. He says his breathing is fine and therapy is going well. He has noticed that his right heel has been bothering him more and more and is quite painful this morning. We discussed that there is no skin breakdown on exam but will float his heels when in bed to prevent skin breakdown.      REVIEW OF SYSTEMS:  PROBLEMS AND REVIEW OF SYSTEMS:   Today on ROS:   Currently, no fever, chills, or rigors. Decreased vision and hearing. Denies any chest pain, headaches, palpitations, lightheadedness, dizziness, shortness of breath, or cough. Appetite is good. Denies any GERD symptoms. Denies any difficulty with swallowing, nausea, or vomiting.  Denies any abdominal pain, diarrhea or constipation. Denies any urinary symptoms. No insomnia.  Positive for weakness, right heel pain, unable to obtain further ROS due to confusion       Allergies   Allergen Reactions   ? Levaquin [Levofloxacin] Itching and Rash     Pt developed itching and redness of forearm with iv administration, resolved with d/c of infusion.     Current Outpatient Medications   Medication Sig   ? acetaminophen (TYLENOL) 500 MG tablet Take 1-2 tablets (500-1,000 mg total) by mouth every 4 (four) hours as needed. (Patient taking differently: Take 1,000 mg by mouth every 6 (six) hours as needed. )   ? FERROUS SULFATE (SLOW FE ORAL) Take 45 mg by mouth every other day.    ? leuprolide, 6 month, (LUPRON DEPOT, 6 MONTH,) 45 mg SyKt Inject 45 mg into the shoulder, thigh, or buttocks every 6 (six) months.   ? lidocaine 4 % patch Place 1 patch on the skin daily. Remove and discard patch with 12 hours or as directed by MD.   ? metoprolol tartrate (LOPRESSOR) 25 MG tablet Take 0.5 tablets (12.5 mg total) by mouth daily.   ? multivitamin therapeutic (THERAGRAN) tablet Take 1 tablet by mouth daily.   ? polyethylene glycol (MIRALAX) 17 gram packet Take 1 packet (17 g total) by mouth daily as needed.   ? pravastatin (PRAVACHOL) 80 MG tablet Take 1 tablet by mouth bedtime.   ? warfarin ANTICOAGULANT (COUMADIN/JANTOVEN) 2.5 MG tablet Take 1.25-2.5 mg by mouth See Admin Instructions. 8/20/20 INR 2.17 Cont 1.25mg MWF, 2.5mg AOD. Next INR 8/24 8/18/20 INR 1.82 1.25mg MWF, 2.5mg AOD.  1.25 mg every Mon, Wed, Fri; 2.5 mg all other days.  Adjust dose based on INR  results as directed.     Past Medical History:    Past Medical History:   Diagnosis Date   ? Anemia    ? Aortic regurgitation    ? Atrial fibrillation (H)    ? Cardiac murmur    ? Chronic kidney disease     stage 3   ? Coronary artery disease    ? GERD (gastroesophageal reflux disease)    ? Hearing loss    ? History of prostate cancer    ? Hyperlipidemia    ? Hypertension    ? Malignant melanoma (H)     left chest   ? Malignant melanoma (H)    ? MI (myocardial infarction) (H) 2013    x1   ? Mitral valve prolapse    ? Osteoporosis    ? Personal history of thromboembolic disease 3/6/2016    History thromboembolic MI in 2013; continue indefinite anticoagulation unless risks outweight benefits.   ? Prostate cancer (H)     had radioactive seed treatment   ? Sleep apnea     no CPAP           PHYSICAL EXAMINATION  Vitals:    08/24/20 0700   BP: 101/54   Pulse: 61   Resp: 18   Temp: 97.8  F (36.6  C)   SpO2: 95%   Weight: 119 lb (54 kg)       Today on physical exam:     GENERAL: Awake, Alert,  not in any form of acute distress, answers questions appropriately, follows simple commands, conversant, weakness  HEENT: Head is normocephalic with normal hair distribution. No evidence of trauma. Ears: No acute purulent discharge. Eyes: Conjunctivae pink with no scleral jaundice. Nose: Normal mucosa and septum. NECK: Supple with no cervical or supraclavicular lymphadenopathy. Trachea is midline. Decreased vision and hearing  CHEST: No tenderness or deformity, no crepitus  LUNG: dim to auscultation with good chest expansion. There are no crackles or wheezes, normal AP diameter. No shortness of breath or cough noted on exam  BACK: No kyphosis of the thoracic spine. Symmetric, no curvature, ROM normal, no CVA tenderness, no spinal tenderness   CVS: irregularly irregular rhythm, there are no murmurs, rubs, gallops, or heaves,  2+ pulses symmetric in all extremities.  ABDOMEN: Rounded and soft, nontender to palpation, non distended, no  masses, no organomegaly, good bowel sounds, no rebound or guarding, no peritoneal signs.   EXTREMITIES: No pedal edema, Left thigh extensive ecchymosis healing, scattered abrasions and ecchymosis on bilateral upper and lower extremities  SKIN: Warm and dry, right heel erythema noted but does wilmer, no skin breakdown, tender to palpation  NEUROLOGICAL: The patient is oriented to person, place, not to time. Otherwise confused, forgetful            LABS:   Recent Results (from the past 168 hour(s))   INR   Result Value Ref Range    INR 1.82 (H) 0.90 - 1.10   INR   Result Value Ref Range    INR 2.17 (H) 0.90 - 1.10   HM2(CBC w/o Differential)   Result Value Ref Range    WBC 5.9 4.0 - 11.0 thou/uL    RBC 2.71 (L) 4.40 - 6.20 mill/uL    Hemoglobin 8.5 (L) 14.0 - 18.0 g/dL    Hematocrit 26.3 (L) 40.0 - 54.0 %    MCV 97 80 - 100 fL    MCH 31.4 27.0 - 34.0 pg    MCHC 32.3 32.0 - 36.0 g/dL    RDW 12.9 11.0 - 14.5 %    Platelets 228 140 - 440 thou/uL    MPV 10.2 8.5 - 12.5 fL   INR   Result Value Ref Range    INR 2.22 (H) 0.90 - 1.10   Comprehensive Metabolic Panel   Result Value Ref Range    Sodium 141 136 - 145 mmol/L    Potassium 3.7 3.5 - 5.0 mmol/L    Chloride 112 (H) 98 - 107 mmol/L    CO2 21 (L) 22 - 31 mmol/L    Anion Gap, Calculation 8 5 - 18 mmol/L    Glucose 78 70 - 125 mg/dL    BUN 26 8 - 28 mg/dL    Creatinine 0.79 0.70 - 1.30 mg/dL    GFR MDRD Af Amer >60 >60 mL/min/1.73m2    GFR MDRD Non Af Amer >60 >60 mL/min/1.73m2    Bilirubin, Total 0.4 0.0 - 1.0 mg/dL    Calcium 10.3 8.5 - 10.5 mg/dL    Protein, Total 5.3 (L) 6.0 - 8.0 g/dL    Albumin 2.5 (L) 3.5 - 5.0 g/dL    Alkaline Phosphatase 55 45 - 120 U/L    AST 26 0 - 40 U/L    ALT 22 0 - 45 U/L   HM1 (CBC with Diff)   Result Value Ref Range    WBC 4.4 4.0 - 11.0 thou/uL    RBC 2.67 (L) 4.40 - 6.20 mill/uL    Hemoglobin 8.4 (L) 14.0 - 18.0 g/dL    Hematocrit 26.4 (L) 40.0 - 54.0 %    MCV 99 80 - 100 fL    MCH 31.5 27.0 - 34.0 pg    MCHC 31.8 (L) 32.0 - 36.0 g/dL     RDW 13.2 11.0 - 14.5 %    Platelets 266 140 - 440 thou/uL    MPV 9.9 8.5 - 12.5 fL    Neutrophils % 56 50 - 70 %    Lymphocytes % 24 20 - 40 %    Monocytes % 10 2 - 10 %    Eosinophils % 7 (H) 0 - 6 %    Basophils % 1 0 - 2 %    Immature Granulocyte % 2 (H) <=0 %    Neutrophils Absolute 2.5 2.0 - 7.7 thou/uL    Lymphocytes Absolute 1.1 0.8 - 4.4 thou/uL    Monocytes Absolute 0.5 0.0 - 0.9 thou/uL    Eosinophils Absolute 0.3 0.0 - 0.4 thou/uL    Basophils Absolute 0.0 0.0 - 0.2 thou/uL    Immature Granulocyte Absolute 0.1 (H) <=0.0 thou/uL     Results for orders placed or performed during the hospital encounter of 10/30/19   Basic Metabolic Panel   Result Value Ref Range    Sodium 144 136 - 145 mmol/L    Potassium 4.6 3.5 - 5.0 mmol/L    Chloride 111 (H) 98 - 107 mmol/L    CO2 22 22 - 31 mmol/L    Anion Gap, Calculation 11 5 - 18 mmol/L    Glucose 145 (H) 70 - 125 mg/dL    Calcium 10.6 (H) 8.5 - 10.5 mg/dL    BUN 38 (H) 8 - 28 mg/dL    Creatinine 1.24 0.70 - 1.30 mg/dL    GFR MDRD Af Amer >60 >60 mL/min/1.73m2    GFR MDRD Non Af Amer 56 (L) >60 mL/min/1.73m2         Lab Results   Component Value Date    WBC 4.4 08/24/2020    HGB 8.4 (L) 08/24/2020    HCT 26.4 (L) 08/24/2020    MCV 99 08/24/2020     08/24/2020       Lab Results   Component Value Date    HABOAUAB48 528 02/24/2015     No results found for: HGBA1C  Lab Results   Component Value Date    INR 2.22 (H) 08/24/2020    INR 2.17 (H) 08/20/2020    INR 1.82 (H) 08/18/2020     Vitamin D, Total (25-Hydroxy)   Date Value Ref Range Status   10/01/2019 50.1 30.0 - 80.0 ng/mL Final     Lab Results   Component Value Date    TSH 1.15 08/13/2020           ASSESSMENT/PLAN:    Lower GI Bleed, melena: Colonoscopy 8/14-large transverse polyp, sigmoid diverticulum, large hemorrhoids. Aspirin stopped, remains on warfarin. Monitor for further signs of blood loss. F/u  With GI in 2 weeks. No n/v/abdominal pain. Tolerating diet. Hg 8.5 on 8/20, dropped from 9 in hospital.   hemoccult stool x 3. Recheck on 8/24. No further signs of bleeding, denies stomach upset, nausea, pain, diarrhea, melena. No notifications from staff of positive hemoccult. Will await hemoglobin results today.   Acute blood loss anemia: hg 12.3-9.3 during hospital course, hg 9 on 8/16. On slow iron every other day. Repeat on 8/24-hg 8.5.    LLL Pneumonia: resolved. IS q1h while awake.   Atrial fibrillation: rate controlled in 60s. On warfarin, inr today. On metoprolol, added hold parameters.   HTN: SBP 100s. On metoprolol,  hold parameter. Encourage fluids. Asymptomatic.   CKD stage 3: cmp ordered for 8/24.   NORMA:  bring CPAP from home and use at bedtime.   HLD: on pravastatin. Aspirin stopped.   Constipation: miralax daily prn. No concerns.   Chronic back pain: on lidoderm patch, tylenol prn. Reports pain controlled today.   Protein calorie malnutrition: daily weights, dietary following. Concerns for failure to thrive at home, frequent falls. 118lbs on admit. Monitor daily.   Frequent Falls: discussed with nursing social service consult, fall prevention protocol. PT, OT following. Scattered abrasions, left thigh ecchymosis. Uses can at home, now recommended to use walker.   Right heel pain: blanchable erythema, will order to float heels when possible and prevalon boots when in bed.     Electronically signed by: Francheska Messina NP

## 2021-06-20 NOTE — LETTER
Letter by Francheska Messina NP at      Author: Francheska Messina NP Service: -- Author Type: --    Filed:  Encounter Date: 2020 Status: (Other)         Patient: Antonio White   MR Number: [id number]   YOB: 1935   Date of Visit: 2020                 [facility]    DATE: 2020    NAME:  Antonio White             :  1935  MRN: [id number]  CODE STATUS:  FULL CODE    VISIT TYPE: H & P (admit tcu, pneumonia, falls, gi bleed)     FACILITY:  [facility] [[id number]]       CHIEF COMPLAIN/REASON FOR VISIT:    Chief Complaint   Patient presents with   ? H & P     admit tcu, pneumonia, falls, gi bleed               HISTORY OF PRESENT ILLNESS: Antonio White is a 85 y.o. male who was admitted - for falls, confusion and found to have pneumonia and anemia. He underwent colonoscopy for lower GI bleeding and acute blood loss from hg 12.3-9.3. He was found to have large transverse polyp, sigmoid diverticulum, large hemorrhoids. His aspirin was held but coumadin eventually resumed and hemoglobin stable at 8 by discharge. He had LLL CAP and was treated with antibiotics. He had metabolic encephalopathy that improved by discharge. He was recommended follow up  With GI and tcu placement for rehab. He has PMH of frequent falls, A fib, HTN, HLD, CKd stage 3, NORMA. Prior to this he was living at home.     Today Mr. White is seen for hospital follow up and admission to TCU visit after pneumonia, falls, and gi bleeding. He is seen in his room in bed today. He says he has no shortness of breath or cough and did not realize he had pneumonia. He thinks his bowels are moving regularly and no urinary concerns. He says he has no swelling in his legs at this time. He does not recall seeing any blood in his stool recently. He has 3 children and would like to remain a full code until he can discuss further with them. He is mildly confused today. He uses a cane to get  around at home. He does use a CPAP at home but doesn't have it here. He denies any other medication concerns today. Nursing denies any acute concerns today.     REVIEW OF SYSTEMS:  PROBLEMS AND REVIEW OF SYSTEMS:   Today on ROS:   Currently, no fever, chills, or rigors. Decreased vision and hearing. Denies any chest pain, headaches, palpitations, lightheadedness, dizziness, shortness of breath, or cough. Appetite is good. Denies any GERD symptoms. Denies any difficulty with swallowing, nausea, or vomiting.  Denies any abdominal pain, diarrhea or constipation. Denies any urinary symptoms. No insomnia.  Positive for weakness, unable to obtain further ROS due to confusion       Allergies   Allergen Reactions   ? Levaquin [Levofloxacin] Itching and Rash     Pt developed itching and redness of forearm with iv administration, resolved with d/c of infusion.     Current Outpatient Medications   Medication Sig   ? acetaminophen (TYLENOL) 500 MG tablet Take 1-2 tablets (500-1,000 mg total) by mouth every 4 (four) hours as needed. (Patient taking differently: Take 1,000 mg by mouth every 6 (six) hours as needed. )   ? FERROUS SULFATE (SLOW FE ORAL) Take 45 mg by mouth every other day.    ? leuprolide, 6 month, (LUPRON DEPOT, 6 MONTH,) 45 mg SyKt Inject 45 mg into the shoulder, thigh, or buttocks every 6 (six) months.   ? lidocaine 4 % patch Place 1 patch on the skin daily. Remove and discard patch with 12 hours or as directed by MD.   ? metoprolol tartrate (LOPRESSOR) 25 MG tablet Take 0.5 tablets (12.5 mg total) by mouth daily.   ? multivitamin therapeutic (THERAGRAN) tablet Take 1 tablet by mouth daily.   ? polyethylene glycol (MIRALAX) 17 gram packet Take 1 packet (17 g total) by mouth daily as needed.   ? pravastatin (PRAVACHOL) 80 MG tablet Take 1 tablet by mouth bedtime.   ? warfarin ANTICOAGULANT (COUMADIN/JANTOVEN) 2.5 MG tablet Take 1.25-2.5 mg by mouth See Admin Instructions. 8/20/20 INR 2.17 Cont 1.25mg MWF, 2.5mg  AOD. Next INR 8/24 8/18/20 INR 1.82 1.25mg MWF, 2.5mg AOD.  1.25 mg every Mon, Wed, Fri; 2.5 mg all other days.  Adjust dose based on INR results as directed.     Past Medical History:    Past Medical History:   Diagnosis Date   ? Anemia    ? Aortic regurgitation    ? Atrial fibrillation (H)    ? Cardiac murmur    ? Chronic kidney disease     stage 3   ? Coronary artery disease    ? GERD (gastroesophageal reflux disease)    ? Hearing loss    ? History of prostate cancer    ? Hyperlipidemia    ? Hypertension    ? Malignant melanoma (H)     left chest   ? Malignant melanoma (H)    ? MI (myocardial infarction) (H) 2013    x1   ? Mitral valve prolapse    ? Osteoporosis    ? Personal history of thromboembolic disease 3/6/2016    History thromboembolic MI in 2013; continue indefinite anticoagulation unless risks outweight benefits.   ? Prostate cancer (H)     had radioactive seed treatment   ? Sleep apnea     no CPAP           PHYSICAL EXAMINATION  Vitals:    08/19/20 0700   BP: 93/60   Pulse: 68   Resp: 18   Temp: 98.7  F (37.1  C)   SpO2: 98%       Today on physical exam:     GENERAL: Awake, Alert,  not in any form of acute distress, answers questions appropriately, follows simple commands, conversant, weakness  HEENT: Head is normocephalic with normal hair distribution. No evidence of trauma. Ears: No acute purulent discharge. Eyes: Conjunctivae pink with no scleral jaundice. Nose: Normal mucosa and septum. NECK: Supple with no cervical or supraclavicular lymphadenopathy. Trachea is midline. Decreased vision and hearing  CHEST: No tenderness or deformity, no crepitus  LUNG: dim to auscultation with good chest expansion. There are no crackles or wheezes, normal AP diameter. No shortness of breath or cough noted on exam  BACK: No kyphosis of the thoracic spine. Symmetric, no curvature, ROM normal, no CVA tenderness, no spinal tenderness   CVS: irregularly irregular rhythm, there are no murmurs, rubs, gallops, or heaves,   2+ pulses symmetric in all extremities.  ABDOMEN: Rounded and soft, nontender to palpation, non distended, no masses, no organomegaly, good bowel sounds, no rebound or guarding, no peritoneal signs.   EXTREMITIES: No pedal edema, Left thigh extensive ecchymosis healing, scattered abrasions and ecchymosis on bilateral upper and lower extremities  SKIN: Warm and dry, no erythema noted.  Skin color, texture, no rashes or lesions.  NEUROLOGICAL: The patient is oriented to person, place, not to time. Otherwise confused, forgetful            LABS:   Recent Results (from the past 168 hour(s))   Hemoglobin   Result Value Ref Range    Hemoglobin 8.9 (L) 14.0 - 18.0 g/dL   INR   Result Value Ref Range    INR 1.71 (H) 0.90 - 1.10   Creatinine   Result Value Ref Range    Creatinine 1.07 0.70 - 1.30 mg/dL    GFR MDRD Af Amer >60 >60 mL/min/1.73m2    GFR MDRD Non Af Amer >60 >60 mL/min/1.73m2   Protime-INR   Result Value Ref Range    INR 1.61 (H) 0.90 - 1.10   Hemoglobin   Result Value Ref Range    Hemoglobin 9.5 (L) 14.0 - 18.0 g/dL   Surgical Pathology Exam   Result Value Ref Range    Case Report       Surgical Pathology                                Case: [id number]                                    Authorizing Provider:  Giancarlo Cardona MD        Collected:           08/14/2020 1325              Ordering Location:     [facility] OR     Received:            08/14/2020 1344              Pathologist:           Jong Medrano MD                                                      Specimen:    Colon, Polyp, Transverse, Transverse Polyp                                                 Final Diagnosis       BIOPSY OF TRANSVERSE COLON:    -  TUBULAR ADENOMA     -  NEGATIVE FOR HIGH GRADE EPITHELIAL DYSPLASIA     -  NORMAL COLON MUCOSA AT BASE OF LESION    Microscopic Description       Microscopic examination performed, substantiating the above diagnosis.    Clinical Information Pre-op Diagnosis:   Lower GI bleed  "[K92.2]     Gross Description       Submitted in formalin in a container labeled with the patient's name, and designated \"transverse polyp,\" is a polypoid fragment of pink-tan to gray mucosa measuring 0.8 x 0.8 cm, with thickness of 0.3 cm. Blue ink is applied to the apparent surgical margin. T&TE-1C    Charges CPT:  14333  ICD-10:  D12.3     Result Flag     INR   Result Value Ref Range    INR 1.35 (H) 0.90 - 1.10   HM2(CBC w/o Differential)   Result Value Ref Range    WBC 5.1 4.0 - 11.0 thou/uL    RBC 2.79 (L) 4.40 - 6.20 mill/uL    Hemoglobin 8.9 (L) 14.0 - 18.0 g/dL    Hematocrit 27.3 (L) 40.0 - 54.0 %    MCV 98 80 - 100 fL    MCH 31.9 27.0 - 34.0 pg    MCHC 32.6 32.0 - 36.0 g/dL    RDW 12.6 11.0 - 14.5 %    Platelets 157 140 - 440 thou/uL    MPV 9.5 8.5 - 12.5 fL   Creatinine   Result Value Ref Range    Creatinine 1.19 0.70 - 1.30 mg/dL    GFR MDRD Af Amer >60 >60 mL/min/1.73m2    GFR MDRD Non Af Amer 58 (L) >60 mL/min/1.73m2   INR   Result Value Ref Range    INR 1.27 (H) 0.90 - 1.10   Hemoglobin   Result Value Ref Range    Hemoglobin 9.0 (L) 14.0 - 18.0 g/dL   INR   Result Value Ref Range    INR 1.70 (H) 0.90 - 1.10   INR   Result Value Ref Range    INR 1.82 (H) 0.90 - 1.10     Results for orders placed or performed during the hospital encounter of 10/30/19   Basic Metabolic Panel   Result Value Ref Range    Sodium 144 136 - 145 mmol/L    Potassium 4.6 3.5 - 5.0 mmol/L    Chloride 111 (H) 98 - 107 mmol/L    CO2 22 22 - 31 mmol/L    Anion Gap, Calculation 11 5 - 18 mmol/L    Glucose 145 (H) 70 - 125 mg/dL    Calcium 10.6 (H) 8.5 - 10.5 mg/dL    BUN 38 (H) 8 - 28 mg/dL    Creatinine 1.24 0.70 - 1.30 mg/dL    GFR MDRD Af Amer >60 >60 mL/min/1.73m2    GFR MDRD Non Af Amer 56 (L) >60 mL/min/1.73m2         Lab Results   Component Value Date    WBC 5.1 08/15/2020    HGB 9.0 (L) 08/16/2020    HCT 27.3 (L) 08/15/2020    MCV 98 08/15/2020     08/15/2020       Lab Results   Component Value Date    DHIORSRC91 528 " 02/24/2015     No results found for: HGBA1C  Lab Results   Component Value Date    INR 1.82 (H) 08/18/2020    INR 1.70 (H) 08/17/2020    INR 1.27 (H) 08/16/2020     Vitamin D, Total (25-Hydroxy)   Date Value Ref Range Status   10/01/2019 50.1 30.0 - 80.0 ng/mL Final     Lab Results   Component Value Date    TSH 1.15 08/13/2020           ASSESSMENT/PLAN:    Lower GI Bleed, melena: Colonoscopy o n 8/14-large transverse polyp, sigmoid diverticulum, large hemorrhoids. Aspirin stopped, remains on warfarin. Monitor for further signs of blood loss. F/u  With GI in 2 weeks. Will monitor anemia. No n/v/abdominal pain. Tolerating diet. Discussed with him and nursing symptoms to monitor for and when to notify for concerns.   Acute blood loss anemia: hg 12.3-9.3 during hospital course, hg 9 on 8/16. On slow iron every other day. Repeat on 8/24.   LLL Pneumonia: completed antibiotics, no o2 use, no shortness of breath or cough. Ordered IS q1h while awake.   Atrial fibrillation: rate controlled in 60s. On warfarin, inr today. On metoprolol, added hold parameters.   HTN: SBP 90s. On metoprolol, added hold parameter. Encourage fluids. Asymptomatic.   CKD stage 3: cmp ordered for 8/24.   NORMA: Ordered to have family bring CPAP from home and use at bedtime.   HLD: on pravastatin. Aspirin stopped.   Constipation: miralax daily prn. No concerns.   Chronic back pain: on lidoderm patch, tylenol prn. Reports pain controlled today.   Protein calorie malnutrition: ordered daily weights, dietary following. Concerns for failure to thrive at home, frequent falls. No admission weight on record. Discussed with nursing.   Frequent Falls: discussed with nursing social service consult, fall prevention protocol. PT, OT following. Scattered abrasions, left thigh ecchymosis. Uses can at home, now recommended to use walker.     Electronically signed by: Francheska Messina NP    Total floor/unit time spent 47 min with >50% time spent on counseling and  coordination of care. Counseling was done regarding gi bleed. Coordinated care with nursing for malnutrition, falls, and gi bleed management.

## 2021-06-20 NOTE — LETTER
Letter by Francheska Messina NP at      Author: Francheska Messina NP Service: -- Author Type: --    Filed:  Encounter Date: 2020 Status: (Other)         Patient: Antonio White   MR Number: [id number]   YOB: 1935   Date of Visit: 2020                 [facility]    DATE: 2020    NAME:  Antonio White             :  1935  MRN: [id number]  CODE STATUS:  FULL CODE    VISIT TYPE: Problem Visit (gi bleed, anemia)     FACILITY:  [facility] [[id number]]       CHIEF COMPLAIN/REASON FOR VISIT:    Chief Complaint   Patient presents with   ? Problem Visit     gi bleed, anemia               HISTORY OF PRESENT ILLNESS: Antonio White is a 85 y.o. male who was admitted - for falls, confusion and found to have pneumonia and anemia. He underwent colonoscopy for lower GI bleeding and acute blood loss from hg 12.3-9.3. He was found to have large transverse polyp, sigmoid diverticulum, large hemorrhoids. His aspirin was held but coumadin eventually resumed and hemoglobin stable at 8 by discharge. He had LLL CAP and was treated with antibiotics. He had metabolic encephalopathy that improved by discharge. He was recommended follow up  With GI and tcu placement for rehab. He has PMH of frequent falls, A fib, HTN, HLD, CKd stage 3, NORMA. Prior to this he was living at home.     Today Mr. White is seen for concerns of anemia and recent GI bleed. He is seen in his room today. He just finished working with therapy. He says he has been doing fine. He denies any concerns with his bowels. He is not sure if he is having blood in his stool again. He denies nausea or stomach upset and seems to be eating fine. He has no other complaints today. Discussed with him the drop in his hemoglobin recently and will collect 3 stool samples to check for hemoccult. Discussed with  Nursing to notify if positive.     REVIEW OF SYSTEMS:  PROBLEMS AND REVIEW OF SYSTEMS:   Today on  ROS:   Currently, no fever, chills, or rigors. Decreased vision and hearing. Denies any chest pain, headaches, palpitations, lightheadedness, dizziness, shortness of breath, or cough. Appetite is good. Denies any GERD symptoms. Denies any difficulty with swallowing, nausea, or vomiting.  Denies any abdominal pain, diarrhea or constipation. Denies any urinary symptoms. No insomnia.  Positive for weakness, unable to obtain further ROS due to confusion       Allergies   Allergen Reactions   ? Levaquin [Levofloxacin] Itching and Rash     Pt developed itching and redness of forearm with iv administration, resolved with d/c of infusion.     Current Outpatient Medications   Medication Sig   ? acetaminophen (TYLENOL) 500 MG tablet Take 1-2 tablets (500-1,000 mg total) by mouth every 4 (four) hours as needed. (Patient taking differently: Take 1,000 mg by mouth every 6 (six) hours as needed. )   ? FERROUS SULFATE (SLOW FE ORAL) Take 45 mg by mouth every other day.    ? leuprolide, 6 month, (LUPRON DEPOT, 6 MONTH,) 45 mg SyKt Inject 45 mg into the shoulder, thigh, or buttocks every 6 (six) months.   ? lidocaine 4 % patch Place 1 patch on the skin daily. Remove and discard patch with 12 hours or as directed by MD.   ? metoprolol tartrate (LOPRESSOR) 25 MG tablet Take 0.5 tablets (12.5 mg total) by mouth daily.   ? multivitamin therapeutic (THERAGRAN) tablet Take 1 tablet by mouth daily.   ? polyethylene glycol (MIRALAX) 17 gram packet Take 1 packet (17 g total) by mouth daily as needed.   ? pravastatin (PRAVACHOL) 80 MG tablet Take 1 tablet by mouth bedtime.   ? warfarin ANTICOAGULANT (COUMADIN/JANTOVEN) 2.5 MG tablet Take 1.25-2.5 mg by mouth See Admin Instructions. 8/20/20 INR 2.17 Cont 1.25mg MWF, 2.5mg AOD. Next INR 8/24 8/18/20 INR 1.82 1.25mg MWF, 2.5mg AOD.  1.25 mg every Mon, Wed, Fri; 2.5 mg all other days.  Adjust dose based on INR results as directed.     Past Medical History:    Past Medical History:   Diagnosis  Date   ? Anemia    ? Aortic regurgitation    ? Atrial fibrillation (H)    ? Cardiac murmur    ? Chronic kidney disease     stage 3   ? Coronary artery disease    ? GERD (gastroesophageal reflux disease)    ? Hearing loss    ? History of prostate cancer    ? Hyperlipidemia    ? Hypertension    ? Malignant melanoma (H)     left chest   ? Malignant melanoma (H)    ? MI (myocardial infarction) (H) 2013    x1   ? Mitral valve prolapse    ? Osteoporosis    ? Personal history of thromboembolic disease 3/6/2016    History thromboembolic MI in 2013; continue indefinite anticoagulation unless risks outweight benefits.   ? Prostate cancer (H)     had radioactive seed treatment   ? Sleep apnea     no CPAP           PHYSICAL EXAMINATION  Vitals:    08/21/20 0700   BP: 103/57   Pulse: 72   Resp: 18   Temp: 98.7  F (37.1  C)   SpO2: 95%   Weight: 118 lb (53.5 kg)       Today on physical exam:     GENERAL: Awake, Alert,  not in any form of acute distress, answers questions appropriately, follows simple commands, conversant, weakness  HEENT: Head is normocephalic with normal hair distribution. No evidence of trauma. Ears: No acute purulent discharge. Eyes: Conjunctivae pink with no scleral jaundice. Nose: Normal mucosa and septum. NECK: Supple with no cervical or supraclavicular lymphadenopathy. Trachea is midline. Decreased vision and hearing  CHEST: No tenderness or deformity, no crepitus  LUNG: dim to auscultation with good chest expansion. There are no crackles or wheezes, normal AP diameter. No shortness of breath or cough noted on exam  BACK: No kyphosis of the thoracic spine. Symmetric, no curvature, ROM normal, no CVA tenderness, no spinal tenderness   CVS: irregularly irregular rhythm, there are no murmurs, rubs, gallops, or heaves,  2+ pulses symmetric in all extremities.  ABDOMEN: Rounded and soft, nontender to palpation, non distended, no masses, no organomegaly, good bowel sounds, no rebound or guarding, no peritoneal  signs.   EXTREMITIES: No pedal edema, Left thigh extensive ecchymosis healing, scattered abrasions and ecchymosis on bilateral upper and lower extremities  SKIN: Warm and dry, no erythema noted.  Skin color, texture, no rashes or lesions.  NEUROLOGICAL: The patient is oriented to person, place, not to time. Otherwise confused, forgetful            LABS:   Recent Results (from the past 168 hour(s))   INR   Result Value Ref Range    INR 1.35 (H) 0.90 - 1.10   HM2(CBC w/o Differential)   Result Value Ref Range    WBC 5.1 4.0 - 11.0 thou/uL    RBC 2.79 (L) 4.40 - 6.20 mill/uL    Hemoglobin 8.9 (L) 14.0 - 18.0 g/dL    Hematocrit 27.3 (L) 40.0 - 54.0 %    MCV 98 80 - 100 fL    MCH 31.9 27.0 - 34.0 pg    MCHC 32.6 32.0 - 36.0 g/dL    RDW 12.6 11.0 - 14.5 %    Platelets 157 140 - 440 thou/uL    MPV 9.5 8.5 - 12.5 fL   Creatinine   Result Value Ref Range    Creatinine 1.19 0.70 - 1.30 mg/dL    GFR MDRD Af Amer >60 >60 mL/min/1.73m2    GFR MDRD Non Af Amer 58 (L) >60 mL/min/1.73m2   INR   Result Value Ref Range    INR 1.27 (H) 0.90 - 1.10   Hemoglobin   Result Value Ref Range    Hemoglobin 9.0 (L) 14.0 - 18.0 g/dL   INR   Result Value Ref Range    INR 1.70 (H) 0.90 - 1.10   INR   Result Value Ref Range    INR 1.82 (H) 0.90 - 1.10   INR   Result Value Ref Range    INR 2.17 (H) 0.90 - 1.10   HM2(CBC w/o Differential)   Result Value Ref Range    WBC 5.9 4.0 - 11.0 thou/uL    RBC 2.71 (L) 4.40 - 6.20 mill/uL    Hemoglobin 8.5 (L) 14.0 - 18.0 g/dL    Hematocrit 26.3 (L) 40.0 - 54.0 %    MCV 97 80 - 100 fL    MCH 31.4 27.0 - 34.0 pg    MCHC 32.3 32.0 - 36.0 g/dL    RDW 12.9 11.0 - 14.5 %    Platelets 228 140 - 440 thou/uL    MPV 10.2 8.5 - 12.5 fL     Results for orders placed or performed during the hospital encounter of 10/30/19   Basic Metabolic Panel   Result Value Ref Range    Sodium 144 136 - 145 mmol/L    Potassium 4.6 3.5 - 5.0 mmol/L    Chloride 111 (H) 98 - 107 mmol/L    CO2 22 22 - 31 mmol/L    Anion Gap, Calculation 11  5 - 18 mmol/L    Glucose 145 (H) 70 - 125 mg/dL    Calcium 10.6 (H) 8.5 - 10.5 mg/dL    BUN 38 (H) 8 - 28 mg/dL    Creatinine 1.24 0.70 - 1.30 mg/dL    GFR MDRD Af Amer >60 >60 mL/min/1.73m2    GFR MDRD Non Af Amer 56 (L) >60 mL/min/1.73m2         Lab Results   Component Value Date    WBC 5.9 08/20/2020    HGB 8.5 (L) 08/20/2020    HCT 26.3 (L) 08/20/2020    MCV 97 08/20/2020     08/20/2020       Lab Results   Component Value Date    [id number] 528 02/24/2015     No results found for: HGBA1C  Lab Results   Component Value Date    INR 2.17 (H) 08/20/2020    INR 1.82 (H) 08/18/2020    INR 1.70 (H) 08/17/2020     Vitamin D, Total (25-Hydroxy)   Date Value Ref Range Status   10/01/2019 50.1 30.0 - 80.0 ng/mL Final     Lab Results   Component Value Date    TSH 1.15 08/13/2020           ASSESSMENT/PLAN:    Lower GI Bleed, melena: Colonoscopy 8/14-large transverse polyp, sigmoid diverticulum, large hemorrhoids. Aspirin stopped, remains on warfarin. Monitor for further signs of blood loss. F/u  With GI in 2 weeks. No n/v/abdominal pain. Tolerating diet. Hg 8.5 on 8/20, dropped from 9 in hospital. Will hemoccult stool x 3. Recheck on 8/24. Monitor closely  For further signs of bleeding.   Acute blood loss anemia: hg 12.3-9.3 during hospital course, hg 9 on 8/16. On slow iron every other day. Repeat on 8/24-hg 8.5.    LLL Pneumonia: resolved. IS q1h while awake.   Atrial fibrillation: rate controlled in 70s. On warfarin, inr today. On metoprolol, added hold parameters.   HTN: SBP 100s. On metoprolol,  hold parameter. Encourage fluids. Asymptomatic.   CKD stage 3: cmp ordered for 8/24-  NORMA:  bring CPAP from home and use at bedtime.   HLD: on pravastatin. Aspirin stopped.   Constipation: miralax daily prn. No concerns.   Chronic back pain: on lidoderm patch, tylenol prn. Reports pain controlled today.   Protein calorie malnutrition: daily weights, dietary following. Concerns for failure to thrive at home, frequent  falls. 118lbs on admit. Monitor daily.   Frequent Falls: discussed with nursing social service consult, fall prevention protocol. PT, OT following. Scattered abrasions, left thigh ecchymosis. Uses can at home, now recommended to use walker.     Electronically signed by: Francheska Messina NP

## 2021-06-20 NOTE — PROGRESS NOTES
INR 2.4 continue on current dose and retest in 2 weeks. After talking with pt and discussing history of greens/salads and medication change. Pt will  continue  with current diet and dosing of Warfarin.  Continue with moderation of Vit K and green leafy vegetables. Cautioned to call with increase bruising or bleeding. Reminded to call with medication change especially antibiotic. Call with any questions or concerns or any up coming procedures. Cautioned about using Herbal medication.

## 2021-06-20 NOTE — LETTER
Letter by Nimisha Maldonado CNP at      Author: Nimisha Maldonado CNP Service: -- Author Type: --    Filed:  Encounter Date: 9/3/2020 Status: (Other)         Patient: Antonio White   MR Number: [id number]   YOB: 1935   Date of Visit: 9/3/2020     [facility]    Facility:   [facility] [[id number]]   Code Status: FULL    CHIEF COMPLAINT/REASON FOR VISIT:  Chief Complaint   Patient presents with   ? Problem Visit     F/U pain        HISTORY:      HPI: Antonio is a 85 y.o. male undergoing physical and occupational therapy at [facility]. He is with past medical history  of atrial fibrillation on Coumadin, CKD 3, NORMA, hyperlipidemia, hypertension.  He presented to the ER on 8/12 for evaluation of confusion and recurrent falls. He was noted to have a lower GI bleed. He underwent a colonoscopy on 8/14 showing large transverse polyp, sigmoid diverticulum and large internal hemorroids, no active bleeding. His ASA was held and coumadin resumed. He was found to have a LLL infiltrate and received  Rocephin and azithromycin x 48hrs, stopped abx August 17, 2020  -procalcitonin normal  -covid-19 negative   His encephalopathy ws thought to be related to hospital acquired delirium and multifactorial.     Today He is seen to review pain and follow-up of labs.  Patient Tylenol was scheduled 2 days ago and he tells me he is given much better relief.  He denies any bleeding.  Hemoglobin monitor.  He is up to 9.2 from 8.4.  He denies fever, chills,Denies any chest pain,headaches,lightheadedness, dizziness,   shortness of breath, or cough. Appetite is fair. Denies any GERD symptoms.   Denies any difficulty with swallowing,Denies any abdominal pain, constipation or loose stools. No insomnia. No active bleeding.  His weights were reviewed and he is up 8.7 pounds in the last 15 days.  He does not have any lower extremity edema and his lung sounds were clear throughout all  fields    Past Medical History:   Diagnosis Date   ? Anemia    ? Aortic regurgitation    ? Atrial fibrillation (H)    ? Cardiac murmur    ? Chronic kidney disease     stage 3   ? Coronary artery disease    ? GERD (gastroesophageal reflux disease)    ? Hearing loss    ? History of prostate cancer    ? Hyperlipidemia    ? Hypertension    ? Malignant melanoma (H)     left chest   ? Malignant melanoma (H)    ? MI (myocardial infarction) (H) 2013    x1   ? Mitral valve prolapse    ? Osteoporosis    ? Personal history of thromboembolic disease 3/6/2016    History thromboembolic MI in 2013; continue indefinite anticoagulation unless risks outweight benefits.   ? Prostate cancer (H)     had radioactive seed treatment   ? Sleep apnea     no CPAP             Family History   Problem Relation Age of Onset   ? Heart disease Mother         hypertension   ? Hypertension Mother    ? Prostate cancer Father    ? Cancer Father         mouth and throat   ? Cancer Sister         kidney   ? Cancer Daughter         skin   ? Cancer Daughter         skin     Social History     Socioeconomic History   ? Marital status:      Spouse name: Not on file   ? Number of children: Not on file   ? Years of education: Not on file   ? Highest education level: Not on file   Occupational History   ? Not on file   Social Needs   ? Financial resource strain: Not on file   ? Food insecurity     Worry: Not on file     Inability: Not on file   ? Transportation needs     Medical: Not on file     Non-medical: Not on file   Tobacco Use   ? Smoking status: Never Smoker   ? Smokeless tobacco: Never Used   Substance and Sexual Activity   ? Alcohol use: Yes     Alcohol/week: 4.0 standard drinks     Types: 4 Glasses of wine per week     Comment: 3-4 glasses wine weekly   ? Drug use: No   ? Sexual activity: Not on file   Lifestyle   ? Physical activity     Days per week: Not on file     Minutes per session: Not on file   ? Stress: Not on file   Relationships    ? Social connections     Talks on phone: Not on file     Gets together: Not on file     Attends Synagogue service: Not on file     Active member of club or organization: Not on file     Attends meetings of clubs or organizations: Not on file     Relationship status: Not on file   ? Intimate partner violence     Fear of current or ex partner: Not on file     Emotionally abused: Not on file     Physically abused: Not on file     Forced sexual activity: Not on file   Other Topics Concern   ? Not on file   Social History Narrative   ? Not on file         Review of Systems   Constitutional: Negative for activity change, appetite change, chills, fatigue and fever.   HENT: Negative for congestion and sore throat.    Eyes: Negative for visual disturbance.   Respiratory: Negative for cough, shortness of breath and wheezing.    Cardiovascular: Negative for chest pain and leg swelling.   Gastrointestinal: Negative for abdominal distention, abdominal pain, constipation, diarrhea and nausea.   Genitourinary: Negative for dysuria.   Musculoskeletal: Positive for back pain. Negative for arthralgias and myalgias.   Skin: Negative for color change, rash and wound.   Neurological: Positive for weakness. Negative for dizziness and numbness.   Psychiatric/Behavioral: Negative for agitation, behavioral problems and sleep disturbance.       Vitals:    09/03/20 2043   BP: 110/67   Pulse: (!) 58   Resp: 18   Temp: 98  F (36.7  C)   SpO2: 96%   Weight: 127 lb 6.4 oz (57.8 kg)       Physical Exam  Constitutional:       Appearance: He is well-developed.      Comments: Pleasant gentleman in on acute distress    HENT:      Head: Normocephalic.   Eyes:      Conjunctiva/sclera: Conjunctivae normal.   Neck:      Musculoskeletal: Normal range of motion.   Cardiovascular:      Rate and Rhythm: Normal rate and regular rhythm.      Heart sounds: Normal heart sounds. No murmur.   Pulmonary:      Effort: No respiratory distress.      Breath sounds:  Normal breath sounds. No wheezing or rales.   Abdominal:      General: Bowel sounds are normal. There is no distension.      Palpations: Abdomen is soft.      Tenderness: There is no abdominal tenderness.   Musculoskeletal: Normal range of motion.   Skin:     General: Skin is warm.   Neurological:      Mental Status: He is alert and oriented to person, place, and time.   Psychiatric:         Behavior: Behavior normal.           LABS:   Recent Results (from the past 240 hour(s))   INR   Result Value Ref Range    INR 2.08 (H) 0.90 - 1.10   INR   Result Value Ref Range    INR 1.68 (H) 0.90 - 1.10   HM1 (CBC with Diff)   Result Value Ref Range    WBC 5.7 4.0 - 11.0 thou/uL    RBC 3.02 (L) 4.40 - 6.20 mill/uL    Hemoglobin 9.2 (L) 14.0 - 18.0 g/dL    Hematocrit 29.6 (L) 40.0 - 54.0 %    MCV 98 80 - 100 fL    MCH 30.5 27.0 - 34.0 pg    MCHC 31.1 (L) 32.0 - 36.0 g/dL    RDW 13.4 11.0 - 14.5 %    Platelets 235 140 - 440 thou/uL    MPV 10.2 8.5 - 12.5 fL    Neutrophils % 67 50 - 70 %    Lymphocytes % 15 (L) 20 - 40 %    Monocytes % 11 (H) 2 - 10 %    Eosinophils % 6 0 - 6 %    Basophils % 1 0 - 2 %    Immature Granulocyte % 1 (H) <=0 %    Neutrophils Absolute 3.8 2.0 - 7.7 thou/uL    Lymphocytes Absolute 0.8 0.8 - 4.4 thou/uL    Monocytes Absolute 0.6 0.0 - 0.9 thou/uL    Eosinophils Absolute 0.4 0.0 - 0.4 thou/uL    Basophils Absolute 0.0 0.0 - 0.2 thou/uL    Immature Granulocyte Absolute 0.0 <=0.0 thou/uL     Current Outpatient Medications   Medication Sig   ? acetaminophen (TYLENOL) 500 MG tablet Take 1-2 tablets (500-1,000 mg total) by mouth every 4 (four) hours as needed. (Patient taking differently: Take 1,000 mg by mouth 3 (three) times a day. And daily PRN)   ? FERROUS SULFATE (SLOW FE ORAL) Take 45 mg by mouth daily.    ? leuprolide, 6 month, (LUPRON DEPOT, 6 MONTH,) 45 mg SyKt Inject 45 mg into the shoulder, thigh, or buttocks every 6 (six) months.   ? lidocaine 4 % patch Place 1 patch on the skin daily. Remove and  discard patch with 12 hours or as directed by MD.   ? metoprolol tartrate (LOPRESSOR) 25 MG tablet Take 0.5 tablets (12.5 mg total) by mouth daily.   ? multivitamin therapeutic (THERAGRAN) tablet Take 1 tablet by mouth daily.   ? polyethylene glycol (MIRALAX) 17 gram packet Take 1 packet (17 g total) by mouth daily as needed.   ? pravastatin (PRAVACHOL) 80 MG tablet Take 1 tablet by mouth bedtime.   ? warfarin sodium (WARFARIN ORAL) Take by mouth. 8/31/20 INR 1.68  Take 2.5mg daily.  Next INR 9/8/20.    8/27/20 INR 2.08  Cont 1.25mg M-W-F and 2.5mg AOD.  Next INR 8/31/20.    8/24/20 INR 2.22 Cont 1.25mg MWF, 2.5mg AOD. Next INR 8/27.  8/20/20 INR 2.17 Cont 1.25mg MWF, 2.5mg AOD. Next INR 8/24 8/18/20 INR 1.82 1.25mg MWF, 2.5mg AOD.  1.25 mg every Mon, Wed, Fri; 2.5 mg all other days.  Adjust dose based on INR results as directed.     ASSESSMENT:      ICD-10-CM    1. Anemia, unspecified type  D64.9    2. Pain management  R52        PLAN:    Chronic atrial fibrillation on Coumadin, Monitor and adjust per INR;s Continue metoprolol tartrate    Acute blood loss anemia _ HGB improved to 9.2 from 8.4. On ferrous sulfate     Hypertension On metoprolol tartrate     Chronic low back pain -  ES Tylenol three times a day and daily PRN     Electronically signed by: Nimisha Maldonado CNP

## 2021-06-20 NOTE — LETTER
Letter by Nimisha Maldonado CNP at      Author: Nimisha Maldonado CNP Service: -- Author Type: --    Filed:  Encounter Date: 9/1/2020 Status: (Other)         Patient: Antonio White   MR Number: [id number]   YOB: 1935   Date of Visit: 9/1/2020     [facility]    Facility:   [facility] [[id number]]   Code Status: FULL    CHIEF COMPLAINT/REASON FOR VISIT:  Chief Complaint   Patient presents with   ? Review Of Multiple Medical Conditions     pain management, F/U GI bleed.        HISTORY:      HPI: Antonio is a 85 y.o. male undergoing physical and occupational therapy at [facility]. He is with past medical history  of atrial fibrillation on Coumadin, CKD 3, NORMA, hyperlipidemia, hypertension.  He presented to the ER on 8/12 for evaluation of confusion and recurrent falls. He was noted to have a lower GI bleed. He underwent a colonoscopy on 8/14 showing large transverse polyp, sigmoid diverticulum and large internal hemorroids, no active bleeding. His ASA was held and coumadin resumed. He was found to have a LLL infiltrate and received  Rocephin and azithromycin x 48hrs, stopped abx August 17, 2020  -procalcitonin normal  -covid-19 negative   His encephalopathy ws thought to be related to hospital acquired delirium and multifactorial.     Today He is seen to review multiple medical issues and to establish care,  Denies fever, chills,Denies any chest pain,headaches,lightheadedness, dizziness,   shortness of breath, or cough. Appetite is good. Denies any GERD symptoms.   Denies any difficulty with swallowing,Denies any abdominal pain, constipation or loose stools. No insomnia. No active bleeding. His concern today was chronic back pain which is relieved with Tylenol but he reports he forgets to ask for it. His HGB has improved today to 9.2 from 8.4 on 8/24/20.     Past Medical History:   Diagnosis Date   ? Anemia    ? Aortic regurgitation    ? Atrial fibrillation  (H)    ? Cardiac murmur    ? Chronic kidney disease     stage 3   ? Coronary artery disease    ? GERD (gastroesophageal reflux disease)    ? Hearing loss    ? History of prostate cancer    ? Hyperlipidemia    ? Hypertension    ? Malignant melanoma (H)     left chest   ? Malignant melanoma (H)    ? MI (myocardial infarction) (H) 2013    x1   ? Mitral valve prolapse    ? Osteoporosis    ? Personal history of thromboembolic disease 3/6/2016    History thromboembolic MI in 2013; continue indefinite anticoagulation unless risks outweight benefits.   ? Prostate cancer (H)     had radioactive seed treatment   ? Sleep apnea     no CPAP             Family History   Problem Relation Age of Onset   ? Heart disease Mother         hypertension   ? Hypertension Mother    ? Prostate cancer Father    ? Cancer Father         mouth and throat   ? Cancer Sister         kidney   ? Cancer Daughter         skin   ? Cancer Daughter         skin     Social History     Socioeconomic History   ? Marital status:      Spouse name: Not on file   ? Number of children: Not on file   ? Years of education: Not on file   ? Highest education level: Not on file   Occupational History   ? Not on file   Social Needs   ? Financial resource strain: Not on file   ? Food insecurity     Worry: Not on file     Inability: Not on file   ? Transportation needs     Medical: Not on file     Non-medical: Not on file   Tobacco Use   ? Smoking status: Never Smoker   ? Smokeless tobacco: Never Used   Substance and Sexual Activity   ? Alcohol use: Yes     Alcohol/week: 4.0 standard drinks     Types: 4 Glasses of wine per week     Comment: 3-4 glasses wine weekly   ? Drug use: No   ? Sexual activity: Not on file   Lifestyle   ? Physical activity     Days per week: Not on file     Minutes per session: Not on file   ? Stress: Not on file   Relationships   ? Social connections     Talks on phone: Not on file     Gets together: Not on file     Attends Confucianist  service: Not on file     Active member of club or organization: Not on file     Attends meetings of clubs or organizations: Not on file     Relationship status: Not on file   ? Intimate partner violence     Fear of current or ex partner: Not on file     Emotionally abused: Not on file     Physically abused: Not on file     Forced sexual activity: Not on file   Other Topics Concern   ? Not on file   Social History Narrative   ? Not on file         Review of Systems   Constitutional: Negative for activity change, appetite change, chills, fatigue and fever.   HENT: Negative for congestion and sore throat.    Eyes: Negative for visual disturbance.   Respiratory: Negative for cough, shortness of breath and wheezing.    Cardiovascular: Negative for chest pain and leg swelling.   Gastrointestinal: Negative for abdominal distention, abdominal pain, constipation, diarrhea and nausea.   Genitourinary: Negative for dysuria.   Musculoskeletal: Positive for back pain. Negative for arthralgias and myalgias.   Skin: Negative for color change, rash and wound.   Neurological: Positive for weakness. Negative for dizziness and numbness.   Psychiatric/Behavioral: Negative for agitation, behavioral problems and sleep disturbance.       Vitals:    09/01/20 1045   BP: 129/81   Pulse: 71   Resp: 16   Temp: 97.1  F (36.2  C)   SpO2: 95%       Physical Exam  Constitutional:       Appearance: He is well-developed.      Comments: Pleasant gentleman in on acute distress    HENT:      Head: Normocephalic.   Eyes:      Conjunctiva/sclera: Conjunctivae normal.   Neck:      Musculoskeletal: Normal range of motion.   Cardiovascular:      Rate and Rhythm: Normal rate and regular rhythm.      Heart sounds: Normal heart sounds. No murmur.   Pulmonary:      Effort: No respiratory distress.      Breath sounds: Normal breath sounds. No wheezing or rales.   Abdominal:      General: Bowel sounds are normal. There is no distension.      Palpations: Abdomen  is soft.      Tenderness: There is no abdominal tenderness.   Musculoskeletal: Normal range of motion.   Skin:     General: Skin is warm.   Neurological:      Mental Status: He is alert and oriented to person, place, and time.   Psychiatric:         Behavior: Behavior normal.           LABS:   Recent Results (from the past 240 hour(s))   INR   Result Value Ref Range    INR 2.22 (H) 0.90 - 1.10   Comprehensive Metabolic Panel   Result Value Ref Range    Sodium 141 136 - 145 mmol/L    Potassium 3.7 3.5 - 5.0 mmol/L    Chloride 112 (H) 98 - 107 mmol/L    CO2 21 (L) 22 - 31 mmol/L    Anion Gap, Calculation 8 5 - 18 mmol/L    Glucose 78 70 - 125 mg/dL    BUN 26 8 - 28 mg/dL    Creatinine 0.79 0.70 - 1.30 mg/dL    GFR MDRD Af Amer >60 >60 mL/min/1.73m2    GFR MDRD Non Af Amer >60 >60 mL/min/1.73m2    Bilirubin, Total 0.4 0.0 - 1.0 mg/dL    Calcium 10.3 8.5 - 10.5 mg/dL    Protein, Total 5.3 (L) 6.0 - 8.0 g/dL    Albumin 2.5 (L) 3.5 - 5.0 g/dL    Alkaline Phosphatase 55 45 - 120 U/L    AST 26 0 - 40 U/L    ALT 22 0 - 45 U/L   HM1 (CBC with Diff)   Result Value Ref Range    WBC 4.4 4.0 - 11.0 thou/uL    RBC 2.67 (L) 4.40 - 6.20 mill/uL    Hemoglobin 8.4 (L) 14.0 - 18.0 g/dL    Hematocrit 26.4 (L) 40.0 - 54.0 %    MCV 99 80 - 100 fL    MCH 31.5 27.0 - 34.0 pg    MCHC 31.8 (L) 32.0 - 36.0 g/dL    RDW 13.2 11.0 - 14.5 %    Platelets 266 140 - 440 thou/uL    MPV 9.9 8.5 - 12.5 fL    Neutrophils % 56 50 - 70 %    Lymphocytes % 24 20 - 40 %    Monocytes % 10 2 - 10 %    Eosinophils % 7 (H) 0 - 6 %    Basophils % 1 0 - 2 %    Immature Granulocyte % 2 (H) <=0 %    Neutrophils Absolute 2.5 2.0 - 7.7 thou/uL    Lymphocytes Absolute 1.1 0.8 - 4.4 thou/uL    Monocytes Absolute 0.5 0.0 - 0.9 thou/uL    Eosinophils Absolute 0.3 0.0 - 0.4 thou/uL    Basophils Absolute 0.0 0.0 - 0.2 thou/uL    Immature Granulocyte Absolute 0.1 (H) <=0.0 thou/uL   INR   Result Value Ref Range    INR 2.08 (H) 0.90 - 1.10   INR   Result Value Ref Range    INR  1.68 (H) 0.90 - 1.10   HM1 (CBC with Diff)   Result Value Ref Range    WBC 5.7 4.0 - 11.0 thou/uL    RBC 3.02 (L) 4.40 - 6.20 mill/uL    Hemoglobin 9.2 (L) 14.0 - 18.0 g/dL    Hematocrit 29.6 (L) 40.0 - 54.0 %    MCV 98 80 - 100 fL    MCH 30.5 27.0 - 34.0 pg    MCHC 31.1 (L) 32.0 - 36.0 g/dL    RDW 13.4 11.0 - 14.5 %    Platelets 235 140 - 440 thou/uL    MPV 10.2 8.5 - 12.5 fL    Neutrophils % 67 50 - 70 %    Lymphocytes % 15 (L) 20 - 40 %    Monocytes % 11 (H) 2 - 10 %    Eosinophils % 6 0 - 6 %    Basophils % 1 0 - 2 %    Immature Granulocyte % 1 (H) <=0 %    Neutrophils Absolute 3.8 2.0 - 7.7 thou/uL    Lymphocytes Absolute 0.8 0.8 - 4.4 thou/uL    Monocytes Absolute 0.6 0.0 - 0.9 thou/uL    Eosinophils Absolute 0.4 0.0 - 0.4 thou/uL    Basophils Absolute 0.0 0.0 - 0.2 thou/uL    Immature Granulocyte Absolute 0.0 <=0.0 thou/uL     Current Outpatient Medications   Medication Sig   ? acetaminophen (TYLENOL) 500 MG tablet Take 1-2 tablets (500-1,000 mg total) by mouth every 4 (four) hours as needed. (Patient taking differently: Take 1,000 mg by mouth every 6 (six) hours as needed. )   ? FERROUS SULFATE (SLOW FE ORAL) Take 45 mg by mouth daily.    ? leuprolide, 6 month, (LUPRON DEPOT, 6 MONTH,) 45 mg SyKt Inject 45 mg into the shoulder, thigh, or buttocks every 6 (six) months.   ? lidocaine 4 % patch Place 1 patch on the skin daily. Remove and discard patch with 12 hours or as directed by MD.   ? metoprolol tartrate (LOPRESSOR) 25 MG tablet Take 0.5 tablets (12.5 mg total) by mouth daily.   ? multivitamin therapeutic (THERAGRAN) tablet Take 1 tablet by mouth daily.   ? polyethylene glycol (MIRALAX) 17 gram packet Take 1 packet (17 g total) by mouth daily as needed.   ? pravastatin (PRAVACHOL) 80 MG tablet Take 1 tablet by mouth bedtime.   ? warfarin sodium (WARFARIN ORAL) Take by mouth. 8/31/20 INR 1.68  Take 2.5mg daily.  Next INR 9/8/20.    8/27/20 INR 2.08  Cont 1.25mg M-W-F and 2.5mg AOD.  Next INR 8/31/20.     8/24/20 INR 2.22 Cont 1.25mg MWF, 2.5mg AOD. Next INR 8/27.  8/20/20 INR 2.17 Cont 1.25mg MWF, 2.5mg AOD. Next INR 8/24 8/18/20 INR 1.82 1.25mg MWF, 2.5mg AOD.  1.25 mg every Mon, Wed, Fri; 2.5 mg all other days.  Adjust dose based on INR results as directed.     ASSESSMENT:      ICD-10-CM    1. Chronic atrial fibrillation (H)  I48.20    2. Acute blood loss anemia  D62    3. Benign Essential Hypertension  I10    4. Pain management  R52        PLAN:    Chronic atrial fibrillation on Coumadin, Monitor and adjust per INR;s Continue metoprolol tartrate    Acute blood loss anemia _ HGB improved to 9.2 from 8.4. On ferrous sulfate     Hypertension On metoprolol tartrate     Chronic low back pain - Schedule ES Tylenol three times a day and daily PRN     Electronically signed by: Nimisha Maldonado, CNP

## 2021-06-21 NOTE — PROGRESS NOTES
INR 1.7 increase dose to 5 mg tue and Friday and 2.5 mg all other days. Increase dose and retest in 2 weeks. After talking with pt and discussing history of greens/salads and medication change. Pt will  continue  with current diet and dosing of Warfarin.  Continue with moderation of Vit K and green leafy vegetables. Cautioned to call with increase bruising or bleeding. Reminded to call with medication change especially antibiotic. Call with any questions or concerns or any up coming procedures. Cautioned about using Herbal medication.     Detail Level: Zone Detail Level: Detailed

## 2021-06-22 LAB — INR PPP: 2.2 (ref 0.9–1.1)

## 2021-06-22 NOTE — PROGRESS NOTES
[facility] Office Note    Assessment / Plan:    1.  Atrial fibrillation with adequate rate control on present medical regimen  2.  Coronary artery disease  Stable on medical therapy without symptoms  3.  Valvular heart disease .  Progressive mitral insufficiency, now with worsening left ventricular systolic function.  He does appear to be relatively asymptomatic with it but we discussed that a mitral clip may help to prevent further worsening of ventricular function.  He is interested in proceeding with further evaluation.  I will set him up for a visit with valve clinic  4.  Ascending aortic aneurysm.  Stable on most recent CT chest 1 year ago.      Plan follow-up 6 months     ______________________________________________________________________    Subjective:    I had the opportunity to see Antonio White at the [facility]. Antonio White is a 83 y.o. male with a known history of coronary artery disease status post percutaneous intervention to the circumflex in May 2013.  He also has a history of atrial fibrillation, maintained on warfarin with generally therapeutic INRs .  MRI demonstrated bicuspid aortic valve with some aneurysmal dilatation of the aortic root with aortic insufficiency.   A follow-up CT 2015 showed aortic root enlargement at 4.2 cm.  Moderate to severe mitral insufficiency has been noted, worsened on recent echocardiographic assessment.  He returns today for routine follow-up.    Since I saw him last year, he is generally felt well.  He is now walking on the treadmill for 15 or 20 minutes once or twice a day.  He does note that he walks at a slow pace but denies any associated fatigue or shortness of breath.  He does not break a sweat with his activity.  His walking otherwise is quite cautious, using a cane.  He has had no further falls since I saw him last.  He is dizzy with standing initially but this passes quickly.  He denies orthopnea or paroxysmal nocturnal  dyspnea.  He has had no chest pains.    ______________________________________________________________________    Problem List:  Patient Active Problem List   Diagnosis     Adenocarcinoma Of The Prostate Gland     Benign Essential Hypertension     Aortic Regurgitation     Atrial fibrillation (H)     Mitral insufficiency     Closed left hip fracture (H)     Kidney disease, chronic, stage III (GFR 30-59 ml/min) (H)     Coronary artery disease     Dyspepsia     Personal history of thromboembolic disease     Anemia     Osteoporosis     Contusion of left hip, initial encounter     Medical History:  Past Medical History:   Diagnosis Date     Anemia      Aortic regurgitation      Atrial fibrillation (H)      Cardiac murmur      Chronic kidney disease     stage 3     Coronary artery disease      GERD (gastroesophageal reflux disease)      Hearing loss      Hyperlipidemia      Hypertension      Malignant melanoma (H)     left chest     MI (myocardial infarction) (H) 2013    x1     Mitral valve prolapse      Osteoporosis      Personal history of thromboembolic disease 3/6/2016    History thromboembolic MI in 2013; continue indefinite anticoagulation unless risks outweight benefits.     Prostate cancer (H)     had radioactive seed treatment     Sleep apnea     no CPAP     Surgical History:  Past Surgical History:   Procedure Laterality Date     CYSTOSCOPY W/ URETERAL STENT PLACEMENT Right 10/31/2014    Procedure: CYSTOSCOPY, RIGHT URETEROSCOPY AND STENT INSERTION;  Surgeon: Chico Nur MD;  Location: [facility];  Service:      HERNIA REPAIR      left inguinal     JOINT REPLACEMENT Left     CHRIS     melanoma surgery      Removal of melanoma on chest.  Cleared by surgeon 1 year ago.     PARTIAL HIP ARTHROPLASTY Left 3/4/2016    Procedure: LEFT HIP FRACTURE BIPOLAR ;  Surgeon: Nichole Menon MD;  Location: [facility] OR;  Service:      OR ANESTH,REPAIR UPPER ABD HERNIA NOS Bilateral     ingiunal      OR  CYSTO/URETERO W/LITHOTRIPSY &INDWELL STENT INSRT Bilateral 2/12/2018    Procedure: CYSTOSCOPY, BILATERAL URETEROSCOPY, LASER LITHOTRIPSY STONE EXTRACTION, URETERAL STENT PLACEMENT;  Surgeon: Lincoln Srinivasan MD;  Location: [facility];  Service: Urology     SC CYSTO/URETERO W/LITHOTRIPSY &INDWELL STENT INSRT Bilateral 3/5/2018    Procedure: CYSTOSCOPY, BILATERAL URETEROSCOPY, LASER LITHOTRIPSY, BASKET EXTRACTION, BILATERAL URETERAL STENT EXCHANGE;  Surgeon: Lincoln Srinivasan MD;  Location: [facility] OR;  Service: Urology     SC CYSTOURETHROSCOPY,URETER CATHETER Bilateral 2/12/2018    Procedure: CYSTOSCOPY, WITH RETROGRADE PYELOGRAM;  Surgeon: Lincoln Srinivasan MD;  Location: [facility];  Service: Urology     TONSILECTOMY, ADENOIDECTOMY, BILATERAL MYRINGOTOMY AND TUBES       TONSILLECTOMY      at age 7     Social History:  Social History     Socioeconomic History     Marital status:      Spouse name: Not on file     Number of children: Not on file     Years of education: Not on file     Highest education level: Not on file   Social Needs     Financial resource strain: Not on file     Food insecurity - worry: Not on file     Food insecurity - inability: Not on file     Transportation needs - medical: Not on file     Transportation needs - non-medical: Not on file   Occupational History     Not on file   Tobacco Use     Smoking status: Never Smoker     Smokeless tobacco: Never Used   Substance and Sexual Activity     Alcohol use: Yes     Alcohol/week: 2.4 oz     Types: 4 Glasses of wine per week     Comment: 3-4 glasses wine weekly     Drug use: No     Sexual activity: Not on file   Other Topics Concern     Not on file   Social History Narrative     Not on file         Review of Systems:   General: WNL  Eyes: WNL  Ears/Nose/Throat: WNL  Lungs: WNL  Heart: WNL  Stomach: WNL  Bladder: WNL  Muscle/Joints: WNL  Skin: WNL  Nervous System: WNL  Mental Health: WNL     Blood: WNL          Family  History:  Family History   Problem Relation Age of Onset     Heart disease Mother         hypertension     Prostate cancer Father      Cancer Father         mouth and throat     Cancer Sister         kidney     Cancer Daughter         skin     Cancer Daughter         skin         Allergies:  Allergies   Allergen Reactions     Levaquin [Levofloxacin] Itching and Rash     Pt developed itching and redness of forearm with iv administration, resolved with d/c of infusion.     Medications:  Current Outpatient Medications   Medication Sig Dispense Refill     amoxicillin (AMOXIL) 500 MG capsule Take 2,000 mg by mouth as needed. Take 1 hour before appointment  1     ASPIRIN (ASPIR-81 ORAL) Take 81 mg by mouth daily.        cholecalciferol, vitamin D3, 1,000 unit tablet Take 1 tablet (1,000 Units total) by mouth daily.  0     fenofibrate (TRIGLIDE) 160 MG tablet Take 160 mg by mouth daily.       FERROUS SULFATE (SLOW FE ORAL) Take 45 mg by mouth daily.       hydroCHLOROthiazide (MICROZIDE) 12.5 mg capsule Take 12.5 mg by mouth daily.       hydroCHLOROthiazide (MICROZIDE) 12.5 mg capsule TAKE ONE CAPSULE BY MOUTH DAILY 90 capsule 1     HYDROcodone-acetaminophen (NORCO) 5-325 mg per tablet Take 1 tablet by mouth every 4 (four) hours as needed for pain. 30 tablet 0     leuprolide, 6 month, (LUPRON DEPOT, 6 MONTH,) 45 mg SyKt Inject 45 mg into the shoulder, thigh, or buttocks every 6 (six) months.       losartan (COZAAR) 100 MG tablet Take 100 mg by mouth daily.       metoprolol succinate (TOPROL-XL) 25 MG TAKE 1 TABLET BY MOUTH DAILY. 90 tablet 0     multivitamin therapeutic (THERAGRAN) tablet Take 1 tablet by mouth daily.       pravastatin (PRAVACHOL) 80 MG tablet Take 1 tablet by mouth bedtime.  1     warfarin (COUMADIN/JANTOVEN) 2.5 MG tablet Take 5 mg T and 2.5 mg all other days. 90 tablet 0     No current facility-administered medications for this visit.        Objective:   Wt Readings from Last 3 Encounters:   12/20/18 143  "lb (64.9 kg)   12/13/18 143 lb (64.9 kg)   03/02/18 143 lb (64.9 kg)     Vital signs:  /70 (Patient Site: Left Arm, Patient Position: Sitting, Cuff Size: Adult Regular)   Pulse 68   Resp 16   Ht 5' 6\" (1.676 m)   Wt 143 lb (64.9 kg)   BMI 23.08 kg/m        Physical Exam:    GENERAL APPEARANCE: Alert, cooperative and in no acute distress.  Slow, deliberate speech.  Frail appearing, walks with a wide-based gait.  HEENT: Conjunctivae not injected.  No scleral icterus. No Xanthelasma. Oral mucous membranes pink and moist.  NECK: No JVD.  No Hepatojugular reflux. Thyroid not enlarged.  CHEST: clear to auscultation  CARDIOVASCULAR: Irregularly irregular S1 and S2.   Apical impulse is laterally displaced.   No diastolic murmur audible.  3 out of 6 musical blowing holosystolic murmur audible at the cardiac apex radiating to the axilla.  Brachial, radial and posterior tibial pulses are intact and symetric. No carotid bruits noted.  ABDOMEN: Nontender. BS+. No bruits.  EXTREMITIES: No edema.  Small varicosity right lower extremity        Lab Results:  LIPIDS:  Lab Results   Component Value Date    CHOL 136 04/12/2018    CHOL 131 11/07/2017    CHOL 108 07/25/2017     Lab Results   Component Value Date    HDL 46 04/12/2018    HDL 47 11/07/2017    HDL 44 07/25/2017     Lab Results   Component Value Date    LDLCALC 76 04/12/2018    LDLCALC 74 11/07/2017    LDLCALC 51 07/25/2017     Lab Results   Component Value Date    TRIG 71 04/12/2018    TRIG 51 11/07/2017    TRIG 64 07/25/2017       Echocardiogram 1/2016:  Summary  Overall left ventricular systolic function is mildly depressed.  Left ventricular ejection fraction is visually estimated to be 45-50%.  Moderate to severe posterior wall hypokinesis  Moderate to severe mitral regurgitation is present.  Mild to moderate aortic regurgitation.  Since 6/21/2013 the mitral regurgitation has increased     Echocardiogram 11/2016:  Summary  Mildly dilated left " ventricle.  Global hypokinesis of the left ventricle with mild impairment of systolic function.  Not all wall segments were well visualized.  Left ventricular ejection fraction is visually estimated to be 45%.  Moderate biatrial enlargement.  Patent foramen ovale with left-to-right shunt was visualized.  Interatrial septum bows to the right, suggesting increased left atrial pressures.  Moderate dilation of the aortic root.  Mild aortic regurgitation.  Moderate mitral regurgitation.  This study was compared with a previously done echocardiogram 1/28/16. The results are similar.    Echocardiogram 12/2018:      1.Left ventricle ejection fraction is moderately decreased. The calculated left ventricular ejection fraction is 41%.    2.TAPSE is abnormal, which is consistent with abnormal right ventricular systolic function.    3.Severe enlargement left atrium and moderate enlargement right atrium.    4.The sinuses of Valsalva is mildly dilated. The aortic root is mildly dilated. The ascending aorta is mildly dilated.    5.Patent foramen ovale present with left to right shunting indicated by color flow Doppler.    6.Moderate to severe mitral regurgitation. There is mild prolapse of the anterior mitral leaflet. There is mild prolapse of the posterior mitral leaflet.    7.When compared to the previous study dated 11/15/2016, left ventricular ejection fraction might be slightly lower, pulmonary hypertension is present, aortic and mitral insufficiency does appear worse.        TAYLA SCOTT MD [facility]  [phone]

## 2021-06-22 NOTE — PROGRESS NOTES
INR 3.0 will decrease dose to 2.5 mg daily. Retest in 2 weeks. After talking with pt and discussing history of greens/salads and medication change. Pt will  continue  with current diet and dosing of Warfarin.  Continue with moderation of Vit K and green leafy vegetables. Cautioned to call with increase bruising or bleeding. Reminded to call with medication change especially antibiotic. Call with any questions or concerns or any up coming procedures. Cautioned about using Herbal medication.

## 2021-06-22 NOTE — PROGRESS NOTES
INR 3.4 decrease dose to 5 mg tue and 2.5 mg all other day. After talking with pt and discussing history of greens/salads and medication change. Pt will  continue  with current diet and dosing of Warfarin.  Continue with moderation of Vit K and green leafy vegetables. Cautioned to call with increase bruising or bleeding. Reminded to call with medication change especially antibiotic. Call with any questions or concerns or any up coming procedures. Cautioned about using Herbal medication.

## 2021-06-23 NOTE — PROGRESS NOTES
INR 2.1 take 2.5 mg daily and retest in 2 weeks.  After talking with pt and discussing history of greens/salads and medication change. Pt will  continue  with current diet and dosing of Warfarin.  Continue with moderation of Vit K and green leafy vegetables. Cautioned to call with increase bruising or bleeding. Reminded to call with medication change especially antibiotic. Call with any questions or concerns or any up coming procedures. Cautioned about using Herbal medication.

## 2021-06-23 NOTE — PATIENT INSTRUCTIONS - HE
INR 1.7 After talking with pt and discussing history of greens/salads and medication change. Pt will  continue  with current diet and dosing of Warfarin.  Continue with moderation of Vit K and green leafy vegetables. Cautioned to call with increase bruising or bleeding. Reminded to call with medication change especially antibiotic. Call with any questions or concerns or any up coming procedures. Cautioned about using Herbal medication.  Increase Sat to 5 mg and then 2.5 mg all other days.

## 2021-06-23 NOTE — PATIENT INSTRUCTIONS - HE
INR 2.1 Continue current management dosing of Warfarin. Continue  diet of moderate Vitamin K intake. Discussed with pt the need to call with questions or concerns or any change in medication especially herbal medication or OTC. Call with increased bleeding or bruising or any upcoming procedures.  .

## 2021-06-23 NOTE — PROGRESS NOTES
INR 1.7 Increase Warfarin to 5 mg Sat and 2.5 mg all other days. After talking with pt and discussing history of greens/salads and medication change. Pt will  continue  with current diet and dosing of Warfarin.  Continue with moderation of Vit K and green leafy vegetables. Cautioned to call with increase bruising or bleeding. Reminded to call with medication change especially antibiotic. Call with any questions or concerns or any up coming procedures. Cautioned about using Herbal medication.

## 2021-06-25 NOTE — PROGRESS NOTES
INR 1.6 increase Warfarin to 5 mg Thur and Sunday and 2.5 mg all other days. Retest in 2 weeks. After talking with pt and discussing history of greens/salads and medication change. Pt will  continue  with current diet and dosing of Warfarin.  Continue with moderation of Vit K and green leafy vegetables. Cautioned to call with increase bruising or bleeding. Reminded to call with medication change especially antibiotic. Call with any questions or concerns or any up coming procedures. Cautioned about using Herbal medication.

## 2021-06-28 NOTE — PROGRESS NOTES
"Progress Notes by Joey Garcia MD at 3/30/2020 11:10 AM     Author: Joey Garcia MD Service: -- Author Type: Physician    Filed: 3/30/2020 11:33 AM Encounter Date: 3/30/2020 Status: Signed    : Joey Garcia MD (Physician)       The patient has been notified of following:     \"This telephone visit will be conducted via a call between you and your physician/provider. We have found that certain health care needs can be provided without the need for a physical exam.  This service lets us provide the care you need with a phone conversation.  If a prescription is necessary we can send it directly to your pharmacy.  If lab work is needed we can place an order for that and you can then stop by our lab to have the test done at a later time. If during the course of the call the physician/provider feels a telephone visit is not appropriate, you will not be charged for this service.\"         HEART CARE PHONE ENCOUNTER        Appointment is being conducted as a telephone visit, to reduce risk of exposure given the current status of Coronovirus in our community. This telephone visit is being conducted via a call between the patient and physician/provider. Health care needs are being provided without a physical exam.     Assessment/Recommendations   Assessment:    1. Chronic atrial fibrillation.  Stable activity tolerance on current medical regimen.  No changes suggested.  2. Coronary artery disease with no anginal symptoms  3. Valvular heart disease with moderate to severe mitral insufficiency and bicuspid aortic valve with aortic insufficiency.  No recent echocardiogram but will not recheck given no plans for intervention even if disease progresses.  No changes in current medical regimen.  Follow-up 6 months.    Plan:  1. No changes in current regimen.       Follow Up Plan: Follow up in 6 months  I have reviewed the note as documented.  This accurately captures the substance of my conversation with the " patient.    Total time of call between patient and provider was 15 minutes     Echo 12/2018:    1.Left ventricle ejection fraction is moderately decreased. The calculated left ventricular ejection fraction is 41%.    2.TAPSE is abnormal, which is consistent with abnormal right ventricular systolic function.    3.Severe enlargement left atrium and moderate enlargement right atrium.    4.The sinuses of Valsalva is mildly dilated. The aortic root is mildly dilated. The ascending aorta is mildly dilated.    5.Patent foramen ovale present with left to right shunting indicated by color flow Doppler.    6.Moderate to severe mitral regurgitation. There is mild prolapse of the anterior mitral leaflet. There is mild prolapse of the posterior mitral leaflet.    7.When compared to the previous study dated 11/15/2016, left ventricular ejection fraction might be slightly lower, pulmonary hypertension is present, aortic and mitral insufficiency does appear worse.     History of Present Illness/Subjective    Antonio White is a 84 y.o. male who is being evaluated via a billable telephone visit.    He has a history of coronary artery disease status post percutaneous intervention of the circumflex and multiple levels following myocardial infarction.  He also has a history of atrial fibrillation, a bicuspid aortic valve with aortic insufficiency.  He also has moderate to severe mitral insufficiency, declining further evaluation for MitraClip last year.  He also expressed at that time his desire for no cardiopulmonary resuscitation in the event of arrest.    He reports no chest pain, palpitations, orthopnea, Paroysmal nocturnal dyspnea, or lightheadedness. He did have 1 fall, without syncope or lightheadedness. He walks around his home without a walker, occasionally on treadmill liited by fatigue but no chest pain or palpitations. He feels thathis stamina is stable.                    I have reviewed and updated the patient's Past  Medical History, Social History, Family History and Medication List.     Physical Examination not perform given phone encounter Review of Systems                                                Medical History  Surgical History Family History Social History   Past Medical History:   Diagnosis Date   ? Anemia    ? Aortic regurgitation    ? Atrial fibrillation (H)    ? Cardiac murmur    ? Chronic kidney disease     stage 3   ? Coronary artery disease    ? GERD (gastroesophageal reflux disease)    ? Hearing loss    ? History of prostate cancer    ? Hyperlipidemia    ? Hypertension    ? Malignant melanoma (H)     left chest   ? Malignant melanoma (H)    ? MI (myocardial infarction) (H) 2013    x1   ? Mitral valve prolapse    ? Osteoporosis    ? Personal history of thromboembolic disease 3/6/2016    History thromboembolic MI in 2013; continue indefinite anticoagulation unless risks outweight benefits.   ? Prostate cancer (H)     had radioactive seed treatment   ? Sleep apnea     no CPAP    Past Surgical History:   Procedure Laterality Date   ? CYSTOSCOPY W/ URETERAL STENT PLACEMENT Right 10/31/2014    Procedure: CYSTOSCOPY, RIGHT URETEROSCOPY AND STENT INSERTION;  Surgeon: Chico Nur MD;  Location: [facility];  Service:    ? HERNIA REPAIR      left inguinal   ? JOINT REPLACEMENT Left     CHRIS   ? melanoma surgery      Removal of melanoma on chest.  Cleared by surgeon 1 year ago.   ? MOHS SURGERY  2009   ? PARTIAL HIP ARTHROPLASTY Left 3/4/2016    Procedure: LEFT HIP FRACTURE BIPOLAR ;  Surgeon: Nichole Menon MD;  Location: [facility];  Service:    ? UT ANESTH,REPAIR UPPER ABD HERNIA NOS Bilateral     ingiunal    ? UT CYSTO/URETERO W/LITHOTRIPSY &INDWELL STENT INSRT Bilateral 2/12/2018    Procedure: CYSTOSCOPY, BILATERAL URETEROSCOPY, LASER LITHOTRIPSY STONE EXTRACTION, URETERAL STENT PLACEMENT;  Surgeon: Lincoln Srinivasan MD;  Location: [facility];  Service: Urology   ? UT CYSTO/URETERO  W/LITHOTRIPSY &INDWELL STENT INSRT Bilateral 3/5/2018    Procedure: CYSTOSCOPY, BILATERAL URETEROSCOPY, LASER LITHOTRIPSY, BASKET EXTRACTION, BILATERAL URETERAL STENT EXCHANGE;  Surgeon: Lincoln Srinivasan MD;  Location: [facility] [place];  Service: Urology   ? [place] CYSTOURETHROSCOPY,URETER CATHETER Bilateral 2/12/2018    Procedure: CYSTOSCOPY, WITH RETROGRADE PYELOGRAM;  Surgeon: Lincoln Srinivasan MD;  Location: [facility] [place];  Service: Urology   ? TONSILECTOMY, ADENOIDECTOMY, BILATERAL MYRINGOTOMY AND TUBES     ? TONSILLECTOMY      at age 7    Family History   Problem Relation Age of Onset   ? Heart disease Mother         hypertension   ? Hypertension Mother    ? Prostate cancer Father    ? Cancer Father         mouth and throat   ? Cancer Sister         kidney   ? Cancer Daughter         skin   ? Cancer Daughter         skin    Social History     Socioeconomic History   ? Marital status:      Spouse name: Not on file   ? Number of children: Not on file   ? Years of education: Not on file   ? Highest education level: Not on file   Occupational History   ? Not on file   Social Needs   ? Financial resource strain: Not on file   ? Food insecurity     Worry: Not on file     Inability: Not on file   ? Transportation needs     Medical: Not on file     Non-medical: Not on file   Tobacco Use   ? Smoking status: Never Smoker   ? Smokeless tobacco: Never Used   Substance and Sexual Activity   ? Alcohol use: Yes     Alcohol/week: 4.0 standard drinks     Types: 4 Glasses of wine per week     Comment: 3-4 glasses wine weekly   ? Drug use: No   ? Sexual activity: Not on file   Lifestyle   ? Physical activity     Days per week: Not on file     Minutes per session: Not on file   ? Stress: Not on file   Relationships   ? Social connections     Talks on phone: Not on file     Gets together: Not on file     Attends Scientology service: Not on file     Active member of club or organization: Not on file     Attends meetings of  clubs or organizations: Not on file     Relationship status: Not on file   ? Intimate partner violence     Fear of current or ex partner: Not on file     Emotionally abused: Not on file     Physically abused: Not on file     Forced sexual activity: Not on file   Other Topics Concern   ? Not on file   Social History Narrative   ? Not on file        Sleep History: Restorative supine, with frequent nocturia.    Exercise History: reports Occasional 10-15 minutes on treadmill.    Medications  Allergies   Current Outpatient Medications   Medication Sig Dispense Refill   ? ASPIRIN (ASPIR-81 ORAL) Take 81 mg by mouth daily.      ? cholecalciferol, vitamin D3, 1,000 unit tablet Take 1 tablet (1,000 Units total) by mouth daily.  0   ? fenofibrate (TRIGLIDE) 160 MG tablet Take 160 mg by mouth daily.     ? FERROUS SULFATE (SLOW FE ORAL) Take 45 mg by mouth daily.     ? furosemide (LASIX) 20 MG tablet TK 1 T PO QD FOR LEG SWELLING     ? leuprolide, 6 month, (LUPRON DEPOT, 6 MONTH,) 45 mg SyKt Inject 45 mg into the shoulder, thigh, or buttocks every 6 (six) months.     ? losartan (COZAAR) 100 MG tablet Take 100 mg by mouth daily.     ? metoprolol succinate (TOPROL-XL) 25 MG TAKE 1 TABLET BY MOUTH DAILY. 90 tablet 0   ? multivitamin therapeutic (THERAGRAN) tablet Take 1 tablet by mouth daily.     ? pravastatin (PRAVACHOL) 80 MG tablet Take 1 tablet by mouth bedtime.  1   ? warfarin ANTICOAGULANT (COUMADIN/JANTOVEN) 2.5 MG tablet TAKE 1-2 TABLETS BY MOUTH DAILY. ADJUST DOSE PER INR RESULTS AS DIRECTED. 96 tablet 1     No current facility-administered medications for this visit.     Allergies   Allergen Reactions   ? Levaquin [Levofloxacin] Itching and Rash     Pt developed itching and redness of forearm with iv administration, resolved with d/c of infusion.         Lab Results    Chemistry/lipid CBC Cardiac Enzymes/BNP/TSH/INR   Lab Results   Component Value Date    CHOL 124 03/12/2020    HDL 49 03/12/2020    LDLCALC 62 03/12/2020     TRIG 67 03/12/2020    CREATININE 1.28 03/12/2020    BUN 31 (H) 03/12/2020    K 4.1 03/12/2020     03/12/2020     (H) 03/12/2020    CO2 24 03/12/2020    Lab Results   Component Value Date    WBC 4.8 03/12/2020    HGB 12.0 (L) 03/12/2020    HCT 37.5 (L) 03/12/2020    MCV 97 03/12/2020     03/12/2020    Lab Results   Component Value Date    CKTOTAL 655 () 06/21/2013    CKMB 47 () 06/21/2013    TROPONINI 0.03 10/30/2019    TSH 1.16 03/12/2020    INR 2.60 (!) 03/17/2020        Joey Garcia

## 2021-06-29 NOTE — PROGRESS NOTES
Progress Notes by Joey Garcia MD at 9/17/2020 12:50 PM     Author: Joey Garcia MD Service: -- Author Type: Physician    Filed: 9/17/2020  1:58 PM Encounter Date: 9/17/2020 Status: Signed    : Joey Garcia MD (Physician)       Heart Care Office Note    Assessment / Plan:    1.  Atrial fibrillation with adequate rate control on present medical regimen.  Continue warfarin unless falls are recurrent or if bleeding develops.  This was discussed at length with Antonio and his daughter  2.  Coronary artery disease- no anginal symptoms  3.  Valvular heart disease .  Mitral insufficiency with decreased left ventricular systolic function.  No plans for intervention.  4.  Ascending aortic aneurysm.  No plan for elective surgical intervention, serial examinations not required      Plan follow-up as needed    ______________________________________________________________________    Subjective:    I had the opportunity to see Antonio White at the [facility]. Antonio White is a 85 y.o. male with a known history of coronary artery disease status post percutaneous intervention to the circumflex in May 2013.  He also has a history of atrial fibrillation, maintained on warfarin.  He has a bicuspid aortic valve with aortic root enlargement and aortic insufficiency.  Moderate to severe mitral insufficiency has been noted but he is not interested in any intervention consideration.  He returns today for routine follow-up, accompanied by his daughter.    He has had a difficult year, with worsening dizziness and confusion.  He has had falls, pneumonia, and developed persistent confusion after colonoscopy with polyp resection and now resides in a care facility.  He is no longer ambulatory, in a wheelchair because of gait instability, and has had no falls in his new facility.  He still has some confusion and notes that many familiar objects are no longer familiar.  He has no awareness of  palpitations lightheadedness or dizziness.  He has had no chest pain.  He has had no syncope.    ______________________________________________________________________    Problem List:  Patient Active Problem List   Diagnosis   ? Adenocarcinoma Of The Prostate Gland   ? Benign Essential Hypertension   ? Aortic Regurgitation   ? Chronic atrial fibrillation (H)   ? Mitral insufficiency   ? Kidney disease, chronic, stage III (GFR 30-59 ml/min) (H)   ? Coronary artery disease   ? Dyspepsia   ? Personal history of thromboembolic disease   ? Anemia   ? Osteoporosis   ? Hyperlipidemia   ? Obstructive sleep apnea   ? Pneumonia   ? Lower GI bleed   ? Hypotension, unspecified hypotension type   ? Acute blood loss anemia   ? Protein calorie malnutrition (H)   ? At high risk for pressure injury of skin   ? Pain of right heel     Medical History:  Past Medical History:   Diagnosis Date   ? Anemia    ? Aortic regurgitation    ? Atrial fibrillation (H)    ? Cardiac murmur    ? Chronic kidney disease     stage 3   ? Coronary artery disease    ? GERD (gastroesophageal reflux disease)    ? Hearing loss    ? History of prostate cancer    ? Hyperlipidemia    ? Hypertension    ? Malignant melanoma (H)     left chest   ? Malignant melanoma (H)    ? MI (myocardial infarction) (H) 2013    x1   ? Mitral valve prolapse    ? Osteoporosis    ? Personal history of thromboembolic disease 3/6/2016    History thromboembolic MI in 2013; continue indefinite anticoagulation unless risks outweight benefits.   ? Prostate cancer (H)     had radioactive seed treatment   ? Sleep apnea     no CPAP     Surgical History:  Past Surgical History:   Procedure Laterality Date   ? CYSTOSCOPY W/ URETERAL STENT PLACEMENT Right 10/31/2014    Procedure: CYSTOSCOPY, RIGHT URETEROSCOPY AND STENT INSERTION;  Surgeon: Chico Nur MD;  Location: [facility];  Service:    ? HERNIA REPAIR      left inguinal   ? JOINT REPLACEMENT Left     CHRIS   ? melanoma  surgery      Removal of melanoma on chest.  Cleared by surgeon 1 year ago.   ? MOHS SURGERY  2009   ? PARTIAL HIP ARTHROPLASTY Left 3/4/2016    Procedure: LEFT HIP FRACTURE BIPOLAR ;  Surgeon: Nichole Menon MD;  Location: [facility] OR;  Service:    ? [place] ANESTH,REPAIR UPPER ABD HERNIA NOS Bilateral     ingiunal    ? [place] COLONOSCOPY FLX DX W/COLLJ SPEC WHEN PFRMD N/A 8/14/2020    Procedure: COLONOSCOPY;  Surgeon: Giancarlo Cardona MD;  Location: [facility] OR;  Service: Gastroenterology   ? [place] CYSTO/URETERO W/LITHOTRIPSY &INDWELL STENT INSRT Bilateral 2/12/2018    Procedure: CYSTOSCOPY, BILATERAL URETEROSCOPY, LASER LITHOTRIPSY STONE EXTRACTION, URETERAL STENT PLACEMENT;  Surgeon: Lincoln Srinivasan MD;  Location: [facility] OR;  Service: Urology   ? [place] CYSTO/URETERO W/LITHOTRIPSY &INDWELL STENT INSRT Bilateral 3/5/2018    Procedure: CYSTOSCOPY, BILATERAL URETEROSCOPY, LASER LITHOTRIPSY, BASKET EXTRACTION, BILATERAL URETERAL STENT EXCHANGE;  Surgeon: Lincoln Srinivasan MD;  Location: [facility] OR;  Service: Urology   ? [place] CYSTOURETHROSCOPY,URETER CATHETER Bilateral 2/12/2018    Procedure: CYSTOSCOPY, WITH RETROGRADE PYELOGRAM;  Surgeon: Lincoln Srinivasan MD;  Location: [facility] OR;  Service: Urology   ? TONSILECTOMY, ADENOIDECTOMY, BILATERAL MYRINGOTOMY AND TUBES     ? TONSILLECTOMY      at age 7     Social History:  Social History     Socioeconomic History   ? Marital status:      Spouse name: Not on file   ? Number of children: Not on file   ? Years of education: Not on file   ? Highest education level: Not on file   Occupational History   ? Not on file   Social Needs   ? Financial resource strain: Not on file   ? Food insecurity     Worry: Not on file     Inability: Not on file   ? Transportation needs     Medical: Not on file     Non-medical: Not on file   Tobacco Use   ? Smoking status: Never Smoker   ? Smokeless tobacco: Never Used   Substance and Sexual Activity   ? Alcohol  use: Yes     Alcohol/week: 4.0 standard drinks     Types: 4 Glasses of wine per week     Comment: 3-4 glasses wine weekly   ? Drug use: No   ? Sexual activity: Not on file   Lifestyle   ? Physical activity     Days per week: Not on file     Minutes per session: Not on file   ? Stress: Not on file   Relationships   ? Social connections     Talks on phone: Not on file     Gets together: Not on file     Attends Sabianist service: Not on file     Active member of club or organization: Not on file     Attends meetings of clubs or organizations: Not on file     Relationship status: Not on file   ? Intimate partner violence     Fear of current or ex partner: Not on file     Emotionally abused: Not on file     Physically abused: Not on file     Forced sexual activity: Not on file   Other Topics Concern   ? Not on file   Social History Narrative   ? Not on file         Review of Systems:   General: WNL  Eyes: WNL  Ears/Nose/Throat: WNL  Lungs: Shortness of Breath  Heart: Shortness of Breath with activity, Leg Swelling  Stomach: WNL  Bladder: WNL  Muscle/Joints: Joint Pain, Muscle Weakness, Muscle Pain  Skin: WNL  Nervous System: Falls, Dizziness, Loss of Balance  Mental Health: Confusion, Anxiety     Blood: WNL          Family History:  Family History   Problem Relation Age of Onset   ? Heart disease Mother         hypertension   ? Hypertension Mother    ? Prostate cancer Father    ? Cancer Father         mouth and throat   ? Cancer Sister         kidney   ? Cancer Daughter         skin   ? Cancer Daughter         skin         Allergies:  Allergies   Allergen Reactions   ? Levaquin [Levofloxacin] Itching and Rash     Pt developed itching and redness of forearm with iv administration, resolved with d/c of infusion.     Medications:  Current Outpatient Medications   Medication Sig Dispense Refill   ? acetaminophen (TYLENOL) 500 MG tablet Take 1-2 tablets (500-1,000 mg total) by mouth every 4 (four) hours as needed. (Patient  taking differently: Take 1,000 mg by mouth 3 (three) times a day. And daily PRN)  0   ? FERROUS SULFATE (SLOW FE ORAL) Take 45 mg by mouth daily.      ? leuprolide, 6 month, (LUPRON DEPOT, 6 MONTH,) 45 mg SyKt Inject 45 mg into the shoulder, thigh, or buttocks every 6 (six) months.     ? lidocaine 4 % patch Place 1 patch on the skin daily. Remove and discard patch with 12 hours or as directed by MD. 30 patch 0   ? metoprolol tartrate (LOPRESSOR) 25 MG tablet Take 0.5 tablets (12.5 mg total) by mouth daily. 30 tablet 0   ? polyethylene glycol (MIRALAX) 17 gram packet Take 1 packet (17 g total) by mouth daily as needed.  0   ? pravastatin (PRAVACHOL) 80 MG tablet Take 1 tablet by mouth bedtime.  1   ? warfarin sodium (WARFARIN ORAL) Take by mouth. 9/9/20 INR 1.99  Cont 2.5mg daily.  Next INR 9/15/20.    9/8/20  Missed INR draw today, give 2.5mg today, check INR 9/9/20.  8/31/20 INR 1.68  Take 2.5mg daily.  Next INR 9/8/20.    8/27/20 INR 2.08  Cont 1.25mg M-W-F and 2.5mg AOD.  Next INR 8/31/20.    8/24/20 INR 2.22 Cont 1.25mg MWF, 2.5mg AOD. Next INR 8/27.  8/20/20 INR 2.17 Cont 1.25mg MWF, 2.5mg AOD. Next INR 8/24 8/18/20 INR 1.82 1.25mg MWF, 2.5mg AOD.  1.25 mg every Mon, Wed, Fri; 2.5 mg all other days.  Adjust dose based on INR results as directed.     ? multivitamin therapeutic (THERAGRAN) tablet Take 1 tablet by mouth daily.       No current facility-administered medications for this visit.        Objective:   Wt Readings from Last 3 Encounters:   09/08/20 126 lb 12.8 oz (57.5 kg)   09/03/20 127 lb 6.4 oz (57.8 kg)   08/28/20 122 lb (55.3 kg)     Vital signs:  There were no vitals taken for this visit.      Physical Exam:    GENERAL APPEARANCE: Alert, cooperative and in no acute distress.  Slow, deliberate speech.  Frail appearing, strapped into transportation chair.  HEENT: Conjunctivae not injected.  No scleral icterus. No Xanthelasma. Oral mucous membranes pink and moist.  NECK: No JVD.  No Hepatojugular  reflux. Thyroid not enlarged.  CHEST: clear to auscultation.  Moderate kyphosis  CARDIOVASCULAR: Irregularly irregular S1 and S2.   Apical impulse is laterally displaced.   No diastolic murmur audible.  3 out of 6  blowing holosystolic murmur audible at the cardiac apex radiating to the axilla.  Brachial, radial and posterior tibial pulses are intact and symetric. No carotid bruits noted.  ABDOMEN: Nontender. BS+. No bruits.  EXTREMITIES: 1+ bilateral ankle edema.  Prominent superficial varicosities bilaterally.        Lab Results:  LIPIDS:  Lab Results   Component Value Date    CHOL 124 07/10/2020    CHOL 124 03/12/2020    CHOL 113 10/01/2019     Lab Results   Component Value Date    HDL 46 07/10/2020    HDL 49 03/12/2020    HDL 44 10/01/2019     Lab Results   Component Value Date    LDLCALC 61 07/10/2020    LDLCALC 62 03/12/2020    LDLCALC 56 10/01/2019     Lab Results   Component Value Date    TRIG 85 07/10/2020    TRIG 67 03/12/2020    TRIG 63 10/01/2019       Echocardiogram 11/2016:  Summary  Mildly dilated left ventricle.  Global hypokinesis of the left ventricle with mild impairment of systolic function.  Not all wall segments were well visualized.  Left ventricular ejection fraction is visually estimated to be 45%.  Moderate biatrial enlargement.  Patent foramen ovale with left-to-right shunt was visualized.  Interatrial septum bows to the right, suggesting increased left atrial pressures.  Moderate dilation of the aortic root.  Mild aortic regurgitation.  Moderate mitral regurgitation.  This study was compared with a previously done echocardiogram 1/28/16. The results are similar.    Echocardiogram 12/2018:      1.Left ventricle ejection fraction is moderately decreased. The calculated left ventricular ejection fraction is 41%.    2.TAPSE is abnormal, which is consistent with abnormal right ventricular systolic function.    3.Severe enlargement left atrium and moderate enlargement right atrium.    4.The  sinuses of Valsalva is mildly dilated. The aortic root is mildly dilated. The ascending aorta is mildly dilated.    5.Patent foramen ovale present with left to right shunting indicated by color flow Doppler.    6.Moderate to severe mitral regurgitation. There is mild prolapse of the anterior mitral leaflet. There is mild prolapse of the posterior mitral leaflet.    7.When compared to the previous study dated 11/15/2016, left ventricular ejection fraction might be slightly lower, pulmonary hypertension is present, aortic and mitral insufficiency does appear worse.        TAYLA SCOTT MD [facility]      [phone]

## 2021-07-03 NOTE — ADDENDUM NOTE
Addendum Note by Bonita Vega, RN at 5/3/2017 12:01 PM     Author: Bonita Vega RN Service: -- Author Type: Registered Nurse    Filed: 5/3/2017 12:01 PM Encounter Date: 5/3/2017 Status: Signed    : Bonita Vega RN (Registered Nurse)    Addended by: BONITA VEGA on: 5/3/2017 12:01 PM        Modules accepted: Orders

## 2021-07-03 NOTE — ANESTHESIA PREPROCEDURE EVALUATION
Anesthesia Preprocedure Evaluation by Asael Hines MD at 8/14/2020 12:23 PM     Author: Asael Hines MD Service: -- Author Type: Physician    Filed: 8/14/2020 12:49 PM Date of Service: 8/14/2020 12:23 PM Status: Addendum    : Asael Hines MD (Physician)    Related Notes: Original Note by Asael Hines MD (Physician) filed at 8/14/2020 12:25 PM       Anesthesia Evaluation      Patient summary reviewed   No history of anesthetic complications     Airway   Mallampati: II  Neck ROM: full   Pulmonary - normal exam   (+) sleep apnea on no CPAP, ,                          Cardiovascular   Exercise tolerance: > or = 4 METS  (+) hypertension, valvular problems/murmurs MVP and AI, past MI, CAD, , hypercholesterolemia,     ECG reviewed (LBBB)  Rhythm: regular  Rate: normal,         Neuro/Psych - negative ROS     Endo/Other       Comments: Anemia    GI/Hepatic/Renal    (+) GERD well controlled,   chronic renal disease CRI,     Comments: Lower GI bleed  Prostate Ca     Other findings:   12/13/18 Echo   1.Left ventricle ejection fraction is moderately decreased. The calculated left ventricular ejection fraction is 41%.    2.TAPSE is abnormal, which is consistent with abnormal right ventricular systolic function.    3.Severe enlargement left atrium and moderate enlargement right atrium.    4.The sinuses of Valsalva is mildly dilated. The aortic root is mildly dilated. The ascending aorta is mildly dilated.    5.Patent foramen ovale present with left to right shunting indicated by color flow Doppler.    6.Moderate to severe mitral regurgitation. There is mild prolapse of the anterior mitral leaflet. There is mild prolapse of the posterior mitral leaflet.    7.When compared to the previous study dated 11/15/2016, left ventricular ejection fraction might be slightly lower, pulmonary hypertension is present, aortic and mitral insufficiency does appear worse.             Dental    (+) caps                            Anesthesia Plan  Planned anesthetic: MAC  Propofol only    HgB pending  ASA 3     Anesthetic plan and risks discussed with: patient    Post-op plan: routine recovery

## 2021-07-03 NOTE — ADDENDUM NOTE
Addendum Note by Bonita Vega, RN at 2/26/2018 11:20 AM     Author: Bonita Vega RN Service: -- Author Type: Registered Nurse    Filed: 2/26/2018 11:20 AM Encounter Date: 2/14/2018 Status: Signed    : Bonita Vega RN (Registered Nurse)    Addended by: BONITA VEGA on: 2/26/2018 11:20 AM        Modules accepted: Orders

## 2021-07-03 NOTE — ADDENDUM NOTE
Addendum Note by Miguel Macedo RN at 7/2/2019 12:39 PM     Author: Miguel Macedo RN Service: -- Author Type: Registered Nurse    Filed: 7/2/2019 12:39 PM Encounter Date: 7/2/2019 Status: Signed    : Miguel Macedo RN (Registered Nurse)    Addended by: MIGUEL MACEDO on: 7/2/2019 12:39 PM        Modules accepted: Orders

## 2021-07-03 NOTE — ADDENDUM NOTE
Addendum Note by Miguel Macedo RN at 5/15/2019  1:27 PM     Author: Miguel Macedo RN Service: -- Author Type: Registered Nurse    Filed: 5/15/2019  1:27 PM Encounter Date: 5/15/2019 Status: Signed    : Miguel Macedo RN (Registered Nurse)    Addended by: MIGUEL MACEDO on: 5/15/2019 01:27 PM        Modules accepted: Orders

## 2021-07-03 NOTE — ADDENDUM NOTE
Addendum Note by Katerina Bhatia CMA at 6/25/2019  1:00 PM     Author: Katerina Bhatia CMA Service: -- Author Type: Certified Medical Assistant    Filed: 6/25/2019  1:02 PM Encounter Date: 6/25/2019 Status: Signed    : Katerina Bhatia CMA (Certified Medical Assistant)    Addended by: KATERINA BHATIA on: 6/25/2019 01:02 PM        Modules accepted: Orders

## 2021-07-04 ENCOUNTER — RECORDS - HEALTHEAST (OUTPATIENT)
Dept: LAB | Facility: CLINIC | Age: 86
End: 2021-07-04

## 2021-07-06 LAB
INR PPP: 2.35 (ref 0.9–1.1)
PSA SERPL-MCNC: <0.1 NG/ML (ref 0–6.5)

## 2021-07-09 ENCOUNTER — COMMUNICATION - HEALTHEAST (OUTPATIENT)
Dept: ADMINISTRATIVE | Facility: CLINIC | Age: 86
End: 2021-07-09

## 2021-07-14 PROBLEM — Z51.5 END OF LIFE CARE: Status: RESOLVED | Noted: 2019-08-09 | Resolved: 2020-08-20

## 2021-07-22 NOTE — PROGRESS NOTES
Progress Notes by Nimisha Maldonado CNP at 9/8/2020 12:53 PM     Author: Nimisha Maldonado CNP Service: -- Author Type: Nurse Practitioner    Filed: 9/9/2020  3:16 PM Encounter Date: 9/8/2020 Status: Attested    : Nimisha Maldonado CNP (Nurse Practitioner) Cosigner: Allan Sanchez DO at 6/18/2021 10:41 AM    Attestation signed by Allan Snachez DO at 6/18/2021 10:41 AM    Allan Sanchez DO                  [facility]    Facility:   [facility] [[id number]]   Code Status: FULL CODE  PCP: Kyrie Lucero MD   Phone: [phone]   Fax: [phone]      CHIEF COMPLAINT/REASON FOR VISIT:  Chief Complaint   Patient presents with   ? Discharge Summary       HISTORY COURSE:  Antonio is a 85 y.o. male undergoing physical and occupational therapy at [facility]. He is with past medical history  of atrial fibrillation on Coumadin, CKD 3, NORMA, hyperlipidemia, hypertension.  He presented to the ER on 8/12 for evaluation of confusion and recurrent falls. He was noted to have a lower GI bleed. He underwent a colonoscopy on 8/14 showing large transverse polyp, sigmoid diverticulum and large internal hemorroids, no active bleeding. His ASA was held and coumadin resumed. He was found to have a LLL infiltrate and received  Rocephin and azithromycin x 48hrs, stopped abx August 17, 2020  -procalcitonin normal  -covid-19 negative   His encephalopathy ws thought to be related to hospital acquired delirium and multifactorial.      Today he is seen for a face to face for discharge.   He will discharge to [facility] on 9/9/20 with current medications and treatments . He will have home care services PT/OT/HHA and RN. He reports his back pain is much better since scheduling his Tylenol. He denies any bleeding.  His HGB   is up to 9.2 from 8.4.  He denies fever, chills,Denies any chest pain,headaches,lightheadedness, dizziness,   shortness of breath, or cough. Appetite is fair.  Denies any GERD symptoms.   Denies any difficulty with swallowing,Denies any abdominal pain, constipation or loose stools. No insomnia. No active bleeding.      Review of Systems  Constitutional: Negative for activity change, appetite change, chills, fatigue and fever.   HENT: Negative for congestion and sore throat.    Eyes: Negative for visual disturbance.   Respiratory: Negative for cough, shortness of breath and wheezing.    Cardiovascular: Negative for chest pain and leg swelling.   Gastrointestinal: Negative for abdominal distention, abdominal pain, constipation, diarrhea and nausea.   Genitourinary: Negative for dysuria.   Musculoskeletal: Positive for back pain. Negative for arthralgias and myalgias.   Skin: Negative for color change, rash and wound.   Neurological: Positive for weakness. Negative for dizziness and numbness.   Psychiatric/Behavioral: Negative for agitation, behavioral problems and sleep disturbance.   Vitals:    09/08/20 1255   BP: 130/72   Pulse: 66   Resp: 16   Temp: 97.4  F (36.3  C)   SpO2: 95%   Weight: 126 lb 12.8 oz (57.5 kg)       Physical Exam  Constitutional:       Appearance: He is well-developed.      Comments: Pleasant gentleman in on acute distress    HENT:      Head: Normocephalic.   Eyes:      Conjunctiva/sclera: Conjunctivae normal.   Neck:      Musculoskeletal: Normal range of motion.   Cardiovascular:      Rate and Rhythm: Normal rate and regular rhythm.      Heart sounds: Normal heart sounds. No murmur.   Pulmonary:      Effort: No respiratory distress.      Breath sounds: Normal breath sounds. No wheezing or rales.   Abdominal:      General: Bowel sounds are normal. There is no distension.      Palpations: Abdomen is soft.      Tenderness: There is no abdominal tenderness.   Musculoskeletal: Normal range of motion.   Skin:     General: Skin is warm.   Neurological:      Mental Status: He is alert and oriented to person, place, and time.   Psychiatric:         Behavior:  Behavior normal.   MEDICATION LIST:  Current Outpatient Medications   Medication Sig   ? acetaminophen (TYLENOL) 500 MG tablet Take 1-2 tablets (500-1,000 mg total) by mouth every 4 (four) hours as needed. (Patient taking differently: Take 1,000 mg by mouth 3 (three) times a day. And daily PRN)   ? FERROUS SULFATE (SLOW FE ORAL) Take 45 mg by mouth daily.    ? leuprolide, 6 month, (LUPRON DEPOT, 6 MONTH,) 45 mg SyKt Inject 45 mg into the shoulder, thigh, or buttocks every 6 (six) months.   ? lidocaine 4 % patch Place 1 patch on the skin daily. Remove and discard patch with 12 hours or as directed by MD.   ? metoprolol tartrate (LOPRESSOR) 25 MG tablet Take 0.5 tablets (12.5 mg total) by mouth daily.   ? multivitamin therapeutic (THERAGRAN) tablet Take 1 tablet by mouth daily.   ? polyethylene glycol (MIRALAX) 17 gram packet Take 1 packet (17 g total) by mouth daily as needed.   ? pravastatin (PRAVACHOL) 80 MG tablet Take 1 tablet by mouth bedtime.   ? warfarin sodium (WARFARIN ORAL) Take by mouth. 8/31/20 INR 1.68  Take 2.5mg daily.  Next INR 9/8/20.    8/27/20 INR 2.08  Cont 1.25mg M-W-F and 2.5mg AOD.  Next INR 8/31/20.    8/24/20 INR 2.22 Cont 1.25mg MWF, 2.5mg AOD. Next INR 8/27.  8/20/20 INR 2.17 Cont 1.25mg MWF, 2.5mg AOD. Next INR 8/24 8/18/20 INR 1.82 1.25mg MWF, 2.5mg AOD.  1.25 mg every Mon, Wed, Fri; 2.5 mg all other days.  Adjust dose based on INR results as directed.       DISCHARGE DIAGNOSIS:    ICD-10-CM    1. Lower GI bleed  K92.2    2. Benign Essential Hypertension  I10    3. Chronic low back pain, unspecified back pain laterality, unspecified whether sciatica present  M54.5     G89.29        MEDICAL EQUIPMENT NEEDS:  None     DISCHARGE PLAN/FACE TO FACE:  I certify that services are/were furnished while this patient was under the care of a physician and that a physician or an allowed non-physician practitioner (NPP), had a face-to-face encounter that meets the physician face-to-face encounter  requirements. The encounter was in whole, or in part, related to the primary reason for home health. The patient is confined to his/her home and needs intermittent skilled nursing, physical therapy, speech-language pathology, or the continued need for occupational therapy. A plan of care has been established by a physician and is periodically reviewed by a physician.  Date of Face-to-Face Encounter: 9/8/20    I certify that, based on my findings, the following services are medically necessary home health services: PT/OT/HHA and RN    My clinical findings support the need for the above skilled services because:PT/OT for continued strength and endurance, HHA to assist with ADl's and RN for VS and medication management.     This patient is homebound because: He is deconditioned and easily fatigued following a GI bleed.     The patient is, or has been, under my care and I have initiated the establishment of the plan of care. This patient will be followed by a physician who will periodically review the plan of care.    Schedule follow up visit with primary care provider within 7 days to reestablish care.    Electronically signed by: Nimisha Maldonado CNP

## 2021-07-22 NOTE — LETTER
Letter by Joey Garcia MD at      Author: Joey Garcia MD Service: -- Author Type: --    Filed:  Encounter Date: 7/9/2021 Status: (Other)         Antonio White  [address]  [place] [address]      July 9, 2021      Dear Antonio,    This letter is to remind you that you will be due for your follow up appointment with Dr. Joey Garcia in September, 2021. To help ensure you are in the best health possible, a regular follow-up with your cardiologist is essential.     Please call our Patient Scheduling Line at [phone] to schedule your appointment at your earliest convenience.  If you have recently scheduled an appointment, please disregard this letter.    We look forward to seeing you again. As always, we are available at the number  above for any questions or concerns you may have.      Sincerely,     The Physicians and Staff of [facility]

## 2021-07-27 ENCOUNTER — LAB REQUISITION (OUTPATIENT)
Dept: LAB | Facility: CLINIC | Age: 86
End: 2021-07-27
Payer: MEDICARE

## 2021-07-27 DIAGNOSIS — I48.0 PAROXYSMAL ATRIAL FIBRILLATION (H): ICD-10-CM

## 2021-07-27 LAB — INR PPP: 1.85 (ref 0.85–1.15)

## 2021-07-27 PROCEDURE — 85610 PROTHROMBIN TIME: CPT | Mod: ORL | Performed by: FAMILY MEDICINE

## 2021-07-27 PROCEDURE — 36415 COLL VENOUS BLD VENIPUNCTURE: CPT | Mod: ORL | Performed by: FAMILY MEDICINE

## 2021-07-27 PROCEDURE — P9603 ONE-WAY ALLOW PRORATED MILES: HCPCS | Mod: ORL | Performed by: FAMILY MEDICINE

## 2021-07-28 ENCOUNTER — COMMUNICATION - HEALTHEAST (OUTPATIENT)
Dept: ADMINISTRATIVE | Facility: CLINIC | Age: 86
End: 2021-07-28

## 2021-08-02 ENCOUNTER — LAB REQUISITION (OUTPATIENT)
Dept: LAB | Facility: CLINIC | Age: 86
End: 2021-08-02
Payer: MEDICARE

## 2021-08-02 DIAGNOSIS — N18.31 CHRONIC KIDNEY DISEASE, STAGE 3A (H): ICD-10-CM

## 2021-08-02 DIAGNOSIS — D63.8 ANEMIA IN OTHER CHRONIC DISEASES CLASSIFIED ELSEWHERE: ICD-10-CM

## 2021-08-02 DIAGNOSIS — I13.10 HYPERTENSIVE HEART AND CHRONIC KIDNEY DISEASE WITHOUT HEART FAILURE, WITH STAGE 1 THROUGH STAGE 4 CHRONIC KIDNEY DISEASE, OR UNSPECIFIED CHRONIC KIDNEY DISEASE: ICD-10-CM

## 2021-08-02 DIAGNOSIS — E55.9 VITAMIN D DEFICIENCY, UNSPECIFIED: ICD-10-CM

## 2021-08-02 DIAGNOSIS — I48.0 PAROXYSMAL ATRIAL FIBRILLATION (H): ICD-10-CM

## 2021-08-03 LAB
ANION GAP SERPL CALCULATED.3IONS-SCNC: 11 MMOL/L (ref 5–18)
BUN SERPL-MCNC: 26 MG/DL (ref 8–28)
CALCIUM SERPL-MCNC: 10.7 MG/DL (ref 8.5–10.5)
CHLORIDE BLD-SCNC: 109 MMOL/L (ref 98–107)
CO2 SERPL-SCNC: 21 MMOL/L (ref 22–31)
CREAT SERPL-MCNC: 0.79 MG/DL (ref 0.7–1.3)
ERYTHROCYTE [DISTWIDTH] IN BLOOD BY AUTOMATED COUNT: 14.3 % (ref 10–15)
GFR SERPL CREATININE-BSD FRML MDRD: 82 ML/MIN/1.73M2
GLUCOSE BLD-MCNC: 83 MG/DL (ref 70–125)
HCT VFR BLD AUTO: 41.2 % (ref 40–53)
HGB BLD-MCNC: 13.3 G/DL (ref 13.3–17.7)
INR PPP: 2.4 (ref 0.85–1.15)
MCH RBC QN AUTO: 30.7 PG (ref 26.5–33)
MCHC RBC AUTO-ENTMCNC: 32.3 G/DL (ref 31.5–36.5)
MCV RBC AUTO: 95 FL (ref 78–100)
PLATELET # BLD AUTO: 174 10E3/UL (ref 150–450)
POTASSIUM BLD-SCNC: 4 MMOL/L (ref 3.5–5)
RBC # BLD AUTO: 4.33 10E6/UL (ref 4.4–5.9)
SODIUM SERPL-SCNC: 141 MMOL/L (ref 136–145)
WBC # BLD AUTO: 5.3 10E3/UL (ref 4–11)

## 2021-08-03 PROCEDURE — 36415 COLL VENOUS BLD VENIPUNCTURE: CPT | Mod: ORL | Performed by: FAMILY MEDICINE

## 2021-08-03 PROCEDURE — 82306 VITAMIN D 25 HYDROXY: CPT | Mod: ORL | Performed by: FAMILY MEDICINE

## 2021-08-03 PROCEDURE — 80048 BASIC METABOLIC PNL TOTAL CA: CPT | Mod: ORL | Performed by: FAMILY MEDICINE

## 2021-08-03 PROCEDURE — P9603 ONE-WAY ALLOW PRORATED MILES: HCPCS | Mod: ORL | Performed by: FAMILY MEDICINE

## 2021-08-03 PROCEDURE — 85027 COMPLETE CBC AUTOMATED: CPT | Mod: ORL | Performed by: FAMILY MEDICINE

## 2021-08-03 PROCEDURE — 85610 PROTHROMBIN TIME: CPT | Mod: ORL | Performed by: FAMILY MEDICINE

## 2021-08-04 LAB — DEPRECATED CALCIDIOL+CALCIFEROL SERPL-MC: 34 UG/L (ref 30–80)

## 2021-08-14 ENCOUNTER — LAB REQUISITION (OUTPATIENT)
Dept: LAB | Facility: CLINIC | Age: 86
End: 2021-08-14
Payer: MEDICARE

## 2021-08-14 DIAGNOSIS — I48.0 PAROXYSMAL ATRIAL FIBRILLATION (H): ICD-10-CM

## 2021-08-17 LAB — INR PPP: 2.39 (ref 0.85–1.15)

## 2021-08-17 PROCEDURE — P9603 ONE-WAY ALLOW PRORATED MILES: HCPCS | Mod: ORL | Performed by: FAMILY MEDICINE

## 2021-08-17 PROCEDURE — 36415 COLL VENOUS BLD VENIPUNCTURE: CPT | Mod: ORL | Performed by: FAMILY MEDICINE

## 2021-08-17 PROCEDURE — 85610 PROTHROMBIN TIME: CPT | Mod: ORL | Performed by: FAMILY MEDICINE

## 2021-09-05 ENCOUNTER — LAB REQUISITION (OUTPATIENT)
Dept: LAB | Facility: CLINIC | Age: 86
End: 2021-09-05
Payer: MEDICARE

## 2021-09-05 DIAGNOSIS — I48.0 PAROXYSMAL ATRIAL FIBRILLATION (H): ICD-10-CM

## 2021-09-07 LAB — INR PPP: 2.51 (ref 0.85–1.15)

## 2021-09-07 PROCEDURE — 85610 PROTHROMBIN TIME: CPT | Mod: ORL | Performed by: FAMILY MEDICINE

## 2021-09-07 PROCEDURE — 36415 COLL VENOUS BLD VENIPUNCTURE: CPT | Mod: ORL | Performed by: FAMILY MEDICINE

## 2021-09-07 PROCEDURE — P9603 ONE-WAY ALLOW PRORATED MILES: HCPCS | Mod: ORL | Performed by: FAMILY MEDICINE

## 2021-10-02 ENCOUNTER — LAB REQUISITION (OUTPATIENT)
Dept: LAB | Facility: CLINIC | Age: 86
End: 2021-10-02
Payer: MEDICARE

## 2021-10-02 DIAGNOSIS — I48.0 PAROXYSMAL ATRIAL FIBRILLATION (H): ICD-10-CM

## 2021-10-05 LAB — INR PPP: 2.67 (ref 0.85–1.15)

## 2021-10-05 PROCEDURE — 85610 PROTHROMBIN TIME: CPT | Mod: ORL | Performed by: FAMILY MEDICINE

## 2021-10-05 PROCEDURE — P9604 ONE-WAY ALLOW PRORATED TRIP: HCPCS | Mod: ORL | Performed by: FAMILY MEDICINE

## 2021-10-05 PROCEDURE — 36415 COLL VENOUS BLD VENIPUNCTURE: CPT | Mod: ORL | Performed by: FAMILY MEDICINE

## 2021-10-11 ENCOUNTER — HEALTH MAINTENANCE LETTER (OUTPATIENT)
Age: 86
End: 2021-10-11

## 2021-11-01 ENCOUNTER — LAB REQUISITION (OUTPATIENT)
Dept: LAB | Facility: CLINIC | Age: 86
End: 2021-11-01
Payer: MEDICARE

## 2021-11-01 DIAGNOSIS — I48.0 PAROXYSMAL ATRIAL FIBRILLATION (H): ICD-10-CM

## 2021-11-02 PROCEDURE — P9603 ONE-WAY ALLOW PRORATED MILES: HCPCS | Mod: ORL | Performed by: FAMILY MEDICINE

## 2021-11-02 PROCEDURE — 85610 PROTHROMBIN TIME: CPT | Mod: ORL | Performed by: FAMILY MEDICINE

## 2021-11-02 PROCEDURE — 36415 COLL VENOUS BLD VENIPUNCTURE: CPT | Mod: ORL | Performed by: FAMILY MEDICINE

## 2021-11-03 LAB — INR PPP: 3.12 (ref 0.85–1.15)

## 2021-11-07 ENCOUNTER — LAB REQUISITION (OUTPATIENT)
Dept: LAB | Facility: CLINIC | Age: 86
End: 2021-11-07
Payer: MEDICARE

## 2021-11-07 DIAGNOSIS — I48.0 PAROXYSMAL ATRIAL FIBRILLATION (H): ICD-10-CM

## 2021-11-09 LAB — INR PPP: 2.42 (ref 0.85–1.15)

## 2021-11-09 PROCEDURE — 85610 PROTHROMBIN TIME: CPT | Mod: ORL | Performed by: FAMILY MEDICINE

## 2021-11-09 PROCEDURE — 36415 COLL VENOUS BLD VENIPUNCTURE: CPT | Mod: ORL | Performed by: FAMILY MEDICINE

## 2021-11-09 PROCEDURE — P9604 ONE-WAY ALLOW PRORATED TRIP: HCPCS | Mod: ORL | Performed by: FAMILY MEDICINE

## 2021-11-14 ENCOUNTER — LAB REQUISITION (OUTPATIENT)
Dept: LAB | Facility: CLINIC | Age: 86
End: 2021-11-14
Payer: MEDICARE

## 2021-11-14 DIAGNOSIS — I48.0 PAROXYSMAL ATRIAL FIBRILLATION (H): ICD-10-CM

## 2021-11-16 LAB — INR PPP: 2.15 (ref 0.85–1.15)

## 2021-11-16 PROCEDURE — P9603 ONE-WAY ALLOW PRORATED MILES: HCPCS | Mod: ORL | Performed by: FAMILY MEDICINE

## 2021-11-16 PROCEDURE — 36415 COLL VENOUS BLD VENIPUNCTURE: CPT | Mod: ORL | Performed by: FAMILY MEDICINE

## 2021-11-16 PROCEDURE — 85610 PROTHROMBIN TIME: CPT | Mod: ORL | Performed by: FAMILY MEDICINE

## 2021-11-28 ENCOUNTER — LAB REQUISITION (OUTPATIENT)
Dept: LAB | Facility: CLINIC | Age: 86
End: 2021-11-28
Payer: MEDICARE

## 2021-11-28 DIAGNOSIS — I48.0 PAROXYSMAL ATRIAL FIBRILLATION (H): ICD-10-CM

## 2021-11-30 LAB — INR PPP: 2.6 (ref 0.85–1.15)

## 2021-11-30 PROCEDURE — 85610 PROTHROMBIN TIME: CPT | Mod: ORL | Performed by: FAMILY MEDICINE

## 2021-11-30 PROCEDURE — 36415 COLL VENOUS BLD VENIPUNCTURE: CPT | Mod: ORL | Performed by: FAMILY MEDICINE

## 2021-11-30 PROCEDURE — P9603 ONE-WAY ALLOW PRORATED MILES: HCPCS | Mod: ORL | Performed by: FAMILY MEDICINE

## 2021-12-19 ENCOUNTER — LAB REQUISITION (OUTPATIENT)
Dept: LAB | Facility: CLINIC | Age: 86
End: 2021-12-19
Payer: MEDICARE

## 2021-12-19 DIAGNOSIS — I48.0 PAROXYSMAL ATRIAL FIBRILLATION (H): ICD-10-CM

## 2021-12-21 PROCEDURE — P9603 ONE-WAY ALLOW PRORATED MILES: HCPCS | Mod: ORL | Performed by: FAMILY MEDICINE

## 2021-12-21 PROCEDURE — 36415 COLL VENOUS BLD VENIPUNCTURE: CPT | Mod: ORL | Performed by: FAMILY MEDICINE

## 2021-12-21 PROCEDURE — G0103 PSA SCREENING: HCPCS | Mod: ORL | Performed by: FAMILY MEDICINE

## 2021-12-21 PROCEDURE — 85610 PROTHROMBIN TIME: CPT | Mod: ORL | Performed by: FAMILY MEDICINE

## 2021-12-22 LAB
INR PPP: 2.62 (ref 0.85–1.15)
PSA SERPL-MCNC: <0.1 UG/L (ref 0–6.5)

## 2022-01-16 ENCOUNTER — LAB REQUISITION (OUTPATIENT)
Dept: LAB | Facility: CLINIC | Age: 87
End: 2022-01-16
Payer: MEDICARE

## 2022-01-16 DIAGNOSIS — I48.0 PAROXYSMAL ATRIAL FIBRILLATION (H): ICD-10-CM

## 2022-01-18 LAB — INR PPP: 2.5 (ref 0.85–1.15)

## 2022-01-18 PROCEDURE — P9603 ONE-WAY ALLOW PRORATED MILES: HCPCS | Mod: ORL | Performed by: FAMILY MEDICINE

## 2022-01-18 PROCEDURE — 36415 COLL VENOUS BLD VENIPUNCTURE: CPT | Mod: ORL | Performed by: FAMILY MEDICINE

## 2022-01-18 PROCEDURE — 85610 PROTHROMBIN TIME: CPT | Mod: ORL | Performed by: FAMILY MEDICINE

## 2022-01-31 ENCOUNTER — LAB REQUISITION (OUTPATIENT)
Dept: LAB | Facility: CLINIC | Age: 87
End: 2022-01-31
Payer: MEDICARE

## 2022-01-31 DIAGNOSIS — I13.10 HYPERTENSIVE HEART AND CHRONIC KIDNEY DISEASE WITHOUT HEART FAILURE, WITH STAGE 1 THROUGH STAGE 4 CHRONIC KIDNEY DISEASE, OR UNSPECIFIED CHRONIC KIDNEY DISEASE: ICD-10-CM

## 2022-01-31 DIAGNOSIS — E55.9 VITAMIN D DEFICIENCY, UNSPECIFIED: ICD-10-CM

## 2022-02-01 LAB
ANION GAP SERPL CALCULATED.3IONS-SCNC: 8 MMOL/L (ref 5–18)
BUN SERPL-MCNC: 19 MG/DL (ref 8–28)
CALCIUM SERPL-MCNC: 10.1 MG/DL (ref 8.5–10.5)
CHLORIDE BLD-SCNC: 112 MMOL/L (ref 98–107)
CO2 SERPL-SCNC: 20 MMOL/L (ref 22–31)
CREAT SERPL-MCNC: 0.8 MG/DL (ref 0.7–1.3)
ERYTHROCYTE [DISTWIDTH] IN BLOOD BY AUTOMATED COUNT: 14.1 % (ref 10–15)
GFR SERPL CREATININE-BSD FRML MDRD: 87 ML/MIN/1.73M2
GLUCOSE BLD-MCNC: 122 MG/DL (ref 70–125)
HCT VFR BLD AUTO: 43.4 % (ref 40–53)
HGB BLD-MCNC: 14 G/DL (ref 13.3–17.7)
MCH RBC QN AUTO: 31.1 PG (ref 26.5–33)
MCHC RBC AUTO-ENTMCNC: 32.3 G/DL (ref 31.5–36.5)
MCV RBC AUTO: 96 FL (ref 78–100)
PLATELET # BLD AUTO: 164 10E3/UL (ref 150–450)
POTASSIUM BLD-SCNC: 4.3 MMOL/L (ref 3.5–5)
RBC # BLD AUTO: 4.5 10E6/UL (ref 4.4–5.9)
SODIUM SERPL-SCNC: 140 MMOL/L (ref 136–145)
WBC # BLD AUTO: 5.5 10E3/UL (ref 4–11)

## 2022-02-01 PROCEDURE — 85027 COMPLETE CBC AUTOMATED: CPT | Mod: ORL | Performed by: FAMILY MEDICINE

## 2022-02-01 PROCEDURE — 36415 COLL VENOUS BLD VENIPUNCTURE: CPT | Mod: ORL | Performed by: FAMILY MEDICINE

## 2022-02-01 PROCEDURE — P9603 ONE-WAY ALLOW PRORATED MILES: HCPCS | Mod: ORL | Performed by: FAMILY MEDICINE

## 2022-02-01 PROCEDURE — 82306 VITAMIN D 25 HYDROXY: CPT | Mod: ORL | Performed by: FAMILY MEDICINE

## 2022-02-01 PROCEDURE — 80048 BASIC METABOLIC PNL TOTAL CA: CPT | Mod: ORL | Performed by: FAMILY MEDICINE

## 2022-02-02 LAB — DEPRECATED CALCIDIOL+CALCIFEROL SERPL-MC: 41 UG/L (ref 30–80)

## 2022-02-21 ENCOUNTER — LAB REQUISITION (OUTPATIENT)
Dept: LAB | Facility: CLINIC | Age: 87
End: 2022-02-21
Payer: MEDICARE

## 2022-02-21 DIAGNOSIS — I48.0 PAROXYSMAL ATRIAL FIBRILLATION (H): ICD-10-CM

## 2022-02-22 LAB — INR PPP: 2.64 (ref 0.85–1.15)

## 2022-02-22 PROCEDURE — 85610 PROTHROMBIN TIME: CPT | Mod: ORL | Performed by: FAMILY MEDICINE

## 2022-02-22 PROCEDURE — 36415 COLL VENOUS BLD VENIPUNCTURE: CPT | Mod: ORL | Performed by: FAMILY MEDICINE

## 2022-02-22 PROCEDURE — P9603 ONE-WAY ALLOW PRORATED MILES: HCPCS | Mod: ORL | Performed by: FAMILY MEDICINE

## 2022-03-02 ENCOUNTER — LAB REQUISITION (OUTPATIENT)
Dept: LAB | Facility: CLINIC | Age: 87
End: 2022-03-02
Payer: MEDICARE

## 2022-03-02 DIAGNOSIS — I48.0 PAROXYSMAL ATRIAL FIBRILLATION (H): ICD-10-CM

## 2022-03-07 ENCOUNTER — LAB REQUISITION (OUTPATIENT)
Dept: LAB | Facility: CLINIC | Age: 87
End: 2022-03-07
Payer: MEDICARE

## 2022-03-07 DIAGNOSIS — I48.0 PAROXYSMAL ATRIAL FIBRILLATION (H): ICD-10-CM

## 2022-03-08 LAB — INR PPP: 2.77 (ref 0.85–1.15)

## 2022-03-08 PROCEDURE — 36415 COLL VENOUS BLD VENIPUNCTURE: CPT | Mod: ORL | Performed by: FAMILY MEDICINE

## 2022-03-08 PROCEDURE — P9603 ONE-WAY ALLOW PRORATED MILES: HCPCS | Mod: ORL | Performed by: FAMILY MEDICINE

## 2022-03-08 PROCEDURE — 85610 PROTHROMBIN TIME: CPT | Mod: ORL | Performed by: FAMILY MEDICINE

## 2022-04-02 ENCOUNTER — LAB REQUISITION (OUTPATIENT)
Dept: LAB | Facility: CLINIC | Age: 87
End: 2022-04-02
Payer: MEDICARE

## 2022-04-02 DIAGNOSIS — I48.0 PAROXYSMAL ATRIAL FIBRILLATION (H): ICD-10-CM

## 2022-04-05 LAB — INR PPP: 2.53 (ref 0.85–1.15)

## 2022-04-05 PROCEDURE — 36415 COLL VENOUS BLD VENIPUNCTURE: CPT | Mod: ORL | Performed by: FAMILY MEDICINE

## 2022-04-05 PROCEDURE — P9604 ONE-WAY ALLOW PRORATED TRIP: HCPCS | Mod: ORL | Performed by: FAMILY MEDICINE

## 2022-04-05 PROCEDURE — 85610 PROTHROMBIN TIME: CPT | Mod: ORL | Performed by: FAMILY MEDICINE

## 2022-05-02 ENCOUNTER — LAB REQUISITION (OUTPATIENT)
Dept: LAB | Facility: CLINIC | Age: 87
End: 2022-05-02
Payer: MEDICARE

## 2022-05-02 DIAGNOSIS — I48.0 PAROXYSMAL ATRIAL FIBRILLATION (H): ICD-10-CM

## 2022-05-03 LAB — INR PPP: 2.54 (ref 0.85–1.15)

## 2022-05-03 PROCEDURE — 85610 PROTHROMBIN TIME: CPT | Mod: ORL | Performed by: FAMILY MEDICINE

## 2022-05-03 PROCEDURE — 36415 COLL VENOUS BLD VENIPUNCTURE: CPT | Mod: ORL | Performed by: FAMILY MEDICINE

## 2022-05-03 PROCEDURE — P9603 ONE-WAY ALLOW PRORATED MILES: HCPCS | Mod: ORL | Performed by: FAMILY MEDICINE

## 2022-05-28 ENCOUNTER — LAB REQUISITION (OUTPATIENT)
Dept: LAB | Facility: CLINIC | Age: 87
End: 2022-05-28
Payer: MEDICARE

## 2022-05-28 DIAGNOSIS — I48.0 PAROXYSMAL ATRIAL FIBRILLATION (H): ICD-10-CM

## 2022-05-31 LAB — INR PPP: 5.24 (ref 0.85–1.15)

## 2022-05-31 PROCEDURE — 85610 PROTHROMBIN TIME: CPT | Mod: ORL | Performed by: FAMILY MEDICINE

## 2022-05-31 PROCEDURE — 36415 COLL VENOUS BLD VENIPUNCTURE: CPT | Mod: ORL | Performed by: FAMILY MEDICINE

## 2022-05-31 PROCEDURE — P9603 ONE-WAY ALLOW PRORATED MILES: HCPCS | Mod: ORL | Performed by: FAMILY MEDICINE

## 2022-06-02 ENCOUNTER — LAB REQUISITION (OUTPATIENT)
Dept: LAB | Facility: CLINIC | Age: 87
End: 2022-06-02
Payer: MEDICARE

## 2022-06-02 DIAGNOSIS — I48.0 PAROXYSMAL ATRIAL FIBRILLATION (H): ICD-10-CM

## 2022-06-06 ENCOUNTER — LAB REQUISITION (OUTPATIENT)
Dept: LAB | Facility: CLINIC | Age: 87
End: 2022-06-06
Payer: MEDICARE

## 2022-06-06 DIAGNOSIS — I48.0 PAROXYSMAL ATRIAL FIBRILLATION (H): ICD-10-CM

## 2022-06-07 LAB — INR PPP: 2.13 (ref 0.85–1.15)

## 2022-06-07 PROCEDURE — P9603 ONE-WAY ALLOW PRORATED MILES: HCPCS | Mod: ORL | Performed by: FAMILY MEDICINE

## 2022-06-07 PROCEDURE — 85610 PROTHROMBIN TIME: CPT | Mod: ORL | Performed by: FAMILY MEDICINE

## 2022-06-07 PROCEDURE — 36415 COLL VENOUS BLD VENIPUNCTURE: CPT | Mod: ORL | Performed by: FAMILY MEDICINE

## 2022-06-12 ENCOUNTER — LAB REQUISITION (OUTPATIENT)
Dept: LAB | Facility: CLINIC | Age: 87
End: 2022-06-12
Payer: MEDICARE

## 2022-06-12 DIAGNOSIS — I48.0 PAROXYSMAL ATRIAL FIBRILLATION (H): ICD-10-CM

## 2022-06-15 ENCOUNTER — LAB REQUISITION (OUTPATIENT)
Dept: LAB | Facility: CLINIC | Age: 87
End: 2022-06-15
Payer: MEDICARE

## 2022-06-15 DIAGNOSIS — I48.0 PAROXYSMAL ATRIAL FIBRILLATION (H): ICD-10-CM

## 2022-06-15 LAB — INR PPP: 2.13 (ref 0.85–1.15)

## 2022-06-15 PROCEDURE — 85610 PROTHROMBIN TIME: CPT | Mod: ORL | Performed by: FAMILY MEDICINE

## 2022-06-15 PROCEDURE — 36415 COLL VENOUS BLD VENIPUNCTURE: CPT | Mod: ORL | Performed by: FAMILY MEDICINE

## 2022-06-15 PROCEDURE — P9603 ONE-WAY ALLOW PRORATED MILES: HCPCS | Mod: ORL | Performed by: FAMILY MEDICINE

## 2022-06-19 ENCOUNTER — LAB REQUISITION (OUTPATIENT)
Dept: LAB | Facility: CLINIC | Age: 87
End: 2022-06-19
Payer: MEDICARE

## 2022-06-19 DIAGNOSIS — I48.0 PAROXYSMAL ATRIAL FIBRILLATION (H): ICD-10-CM

## 2022-06-28 LAB — INR PPP: 2.63 (ref 0.85–1.15)

## 2022-06-28 PROCEDURE — 36415 COLL VENOUS BLD VENIPUNCTURE: CPT | Mod: ORL | Performed by: FAMILY MEDICINE

## 2022-06-28 PROCEDURE — P9603 ONE-WAY ALLOW PRORATED MILES: HCPCS | Mod: ORL | Performed by: FAMILY MEDICINE

## 2022-06-28 PROCEDURE — 85610 PROTHROMBIN TIME: CPT | Mod: ORL | Performed by: FAMILY MEDICINE

## 2022-07-11 ENCOUNTER — LAB REQUISITION (OUTPATIENT)
Dept: LAB | Facility: CLINIC | Age: 87
End: 2022-07-11
Payer: MEDICARE

## 2022-07-11 DIAGNOSIS — I48.0 PAROXYSMAL ATRIAL FIBRILLATION (H): ICD-10-CM

## 2022-07-12 LAB — INR PPP: 2.14 (ref 0.85–1.15)

## 2022-07-12 PROCEDURE — P9603 ONE-WAY ALLOW PRORATED MILES: HCPCS | Mod: ORL | Performed by: FAMILY MEDICINE

## 2022-07-12 PROCEDURE — 85610 PROTHROMBIN TIME: CPT | Mod: ORL | Performed by: FAMILY MEDICINE

## 2022-07-12 PROCEDURE — 36415 COLL VENOUS BLD VENIPUNCTURE: CPT | Mod: ORL | Performed by: FAMILY MEDICINE

## 2022-07-17 ENCOUNTER — HEALTH MAINTENANCE LETTER (OUTPATIENT)
Age: 87
End: 2022-07-17

## 2022-08-05 ENCOUNTER — LAB REQUISITION (OUTPATIENT)
Dept: LAB | Facility: CLINIC | Age: 87
End: 2022-08-05
Payer: MEDICARE

## 2022-08-05 DIAGNOSIS — I48.0 PAROXYSMAL ATRIAL FIBRILLATION (H): ICD-10-CM

## 2022-08-08 ENCOUNTER — LAB REQUISITION (OUTPATIENT)
Dept: LAB | Facility: CLINIC | Age: 87
End: 2022-08-08
Payer: MEDICARE

## 2022-08-08 DIAGNOSIS — E78.5 HYPERLIPIDEMIA, UNSPECIFIED: ICD-10-CM

## 2022-08-08 DIAGNOSIS — I10 ESSENTIAL (PRIMARY) HYPERTENSION: ICD-10-CM

## 2022-08-08 DIAGNOSIS — E03.9 HYPOTHYROIDISM, UNSPECIFIED: ICD-10-CM

## 2022-08-08 DIAGNOSIS — D64.9 ANEMIA, UNSPECIFIED: ICD-10-CM

## 2022-08-09 LAB
ALBUMIN SERPL BCG-MCNC: 4.2 G/DL (ref 3.5–5.2)
ALP SERPL-CCNC: 62 U/L (ref 40–129)
ALT SERPL W P-5'-P-CCNC: 14 U/L (ref 10–50)
ANION GAP SERPL CALCULATED.3IONS-SCNC: 13 MMOL/L (ref 7–15)
AST SERPL W P-5'-P-CCNC: 24 U/L (ref 10–50)
BILIRUB SERPL-MCNC: 0.7 MG/DL
BUN SERPL-MCNC: 20.2 MG/DL (ref 8–23)
CALCIUM SERPL-MCNC: 10.3 MG/DL (ref 8.8–10.2)
CHLORIDE SERPL-SCNC: 111 MMOL/L (ref 98–107)
CHOLEST SERPL-MCNC: 113 MG/DL
CREAT SERPL-MCNC: 0.93 MG/DL (ref 0.67–1.17)
DEPRECATED HCO3 PLAS-SCNC: 20 MMOL/L (ref 22–29)
ERYTHROCYTE [DISTWIDTH] IN BLOOD BY AUTOMATED COUNT: 15.5 % (ref 10–15)
GFR SERPL CREATININE-BSD FRML MDRD: 80 ML/MIN/1.73M2
GLUCOSE SERPL-MCNC: 95 MG/DL (ref 70–99)
HCT VFR BLD AUTO: 42 % (ref 40–53)
HDLC SERPL-MCNC: 39 MG/DL
HGB BLD-MCNC: 13.7 G/DL (ref 13.3–17.7)
INR PPP: 3.08 (ref 0.85–1.15)
LDLC SERPL CALC-MCNC: 40 MG/DL
MCH RBC QN AUTO: 30.2 PG (ref 26.5–33)
MCHC RBC AUTO-ENTMCNC: 32.6 G/DL (ref 31.5–36.5)
MCV RBC AUTO: 93 FL (ref 78–100)
NONHDLC SERPL-MCNC: 74 MG/DL
PLATELET # BLD AUTO: 147 10E3/UL (ref 150–450)
POTASSIUM SERPL-SCNC: 3.9 MMOL/L (ref 3.4–5.3)
PROT SERPL-MCNC: 6.7 G/DL (ref 6.4–8.3)
RBC # BLD AUTO: 4.53 10E6/UL (ref 4.4–5.9)
SODIUM SERPL-SCNC: 144 MMOL/L (ref 136–145)
TRIGL SERPL-MCNC: 170 MG/DL
TSH SERPL DL<=0.005 MIU/L-ACNC: 2 UIU/ML (ref 0.3–4.2)
WBC # BLD AUTO: 5.8 10E3/UL (ref 4–11)

## 2022-08-09 PROCEDURE — 80061 LIPID PANEL: CPT | Mod: ORL | Performed by: FAMILY MEDICINE

## 2022-08-09 PROCEDURE — 84443 ASSAY THYROID STIM HORMONE: CPT | Mod: ORL | Performed by: FAMILY MEDICINE

## 2022-08-09 PROCEDURE — 85610 PROTHROMBIN TIME: CPT | Mod: ORL | Performed by: FAMILY MEDICINE

## 2022-08-09 PROCEDURE — 80053 COMPREHEN METABOLIC PANEL: CPT | Mod: ORL | Performed by: FAMILY MEDICINE

## 2022-08-09 PROCEDURE — 85027 COMPLETE CBC AUTOMATED: CPT | Mod: ORL | Performed by: FAMILY MEDICINE

## 2022-08-09 PROCEDURE — 36415 COLL VENOUS BLD VENIPUNCTURE: CPT | Mod: ORL | Performed by: FAMILY MEDICINE

## 2022-08-14 ENCOUNTER — LAB REQUISITION (OUTPATIENT)
Dept: LAB | Facility: CLINIC | Age: 87
End: 2022-08-14
Payer: MEDICARE

## 2022-08-14 DIAGNOSIS — I48.0 PAROXYSMAL ATRIAL FIBRILLATION (H): ICD-10-CM

## 2022-08-16 LAB — INR PPP: 2.66 (ref 0.85–1.15)

## 2022-08-16 PROCEDURE — 36415 COLL VENOUS BLD VENIPUNCTURE: CPT | Mod: ORL | Performed by: FAMILY MEDICINE

## 2022-08-16 PROCEDURE — 85610 PROTHROMBIN TIME: CPT | Mod: ORL | Performed by: FAMILY MEDICINE

## 2022-08-16 PROCEDURE — P9604 ONE-WAY ALLOW PRORATED TRIP: HCPCS | Mod: ORL | Performed by: FAMILY MEDICINE

## 2022-08-26 ENCOUNTER — LAB REQUISITION (OUTPATIENT)
Dept: LAB | Facility: CLINIC | Age: 87
End: 2022-08-26
Payer: MEDICARE

## 2022-08-26 DIAGNOSIS — I48.0 PAROXYSMAL ATRIAL FIBRILLATION (H): ICD-10-CM

## 2022-08-30 LAB — INR PPP: 2.66 (ref 0.85–1.15)

## 2022-08-30 PROCEDURE — 36415 COLL VENOUS BLD VENIPUNCTURE: CPT | Mod: ORL | Performed by: FAMILY MEDICINE

## 2022-08-30 PROCEDURE — P9603 ONE-WAY ALLOW PRORATED MILES: HCPCS | Mod: ORL | Performed by: FAMILY MEDICINE

## 2022-08-30 PROCEDURE — 85610 PROTHROMBIN TIME: CPT | Mod: ORL | Performed by: FAMILY MEDICINE

## 2022-09-17 ENCOUNTER — LAB REQUISITION (OUTPATIENT)
Dept: LAB | Facility: CLINIC | Age: 87
End: 2022-09-17
Payer: MEDICARE

## 2022-09-17 DIAGNOSIS — I48.0 PAROXYSMAL ATRIAL FIBRILLATION (H): ICD-10-CM

## 2022-09-20 LAB — INR PPP: 2.57 (ref 0.85–1.15)

## 2022-09-20 PROCEDURE — P9604 ONE-WAY ALLOW PRORATED TRIP: HCPCS | Mod: ORL | Performed by: FAMILY MEDICINE

## 2022-09-20 PROCEDURE — 36415 COLL VENOUS BLD VENIPUNCTURE: CPT | Mod: ORL | Performed by: FAMILY MEDICINE

## 2022-09-20 PROCEDURE — 85610 PROTHROMBIN TIME: CPT | Mod: ORL | Performed by: FAMILY MEDICINE

## 2022-09-25 ENCOUNTER — HEALTH MAINTENANCE LETTER (OUTPATIENT)
Age: 87
End: 2022-09-25

## 2022-10-13 ENCOUNTER — LAB REQUISITION (OUTPATIENT)
Dept: LAB | Facility: CLINIC | Age: 87
End: 2022-10-13
Payer: MEDICARE

## 2022-10-13 DIAGNOSIS — I48.0 PAROXYSMAL ATRIAL FIBRILLATION (H): ICD-10-CM

## 2022-10-18 LAB — INR PPP: 2.27 (ref 0.85–1.15)

## 2022-10-18 PROCEDURE — P9604 ONE-WAY ALLOW PRORATED TRIP: HCPCS | Mod: ORL | Performed by: FAMILY MEDICINE

## 2022-10-18 PROCEDURE — 85610 PROTHROMBIN TIME: CPT | Mod: ORL | Performed by: FAMILY MEDICINE

## 2022-10-18 PROCEDURE — 36415 COLL VENOUS BLD VENIPUNCTURE: CPT | Mod: ORL | Performed by: FAMILY MEDICINE

## 2022-11-04 ENCOUNTER — LAB REQUISITION (OUTPATIENT)
Dept: LAB | Facility: CLINIC | Age: 87
End: 2022-11-04
Payer: MEDICARE

## 2022-11-04 DIAGNOSIS — I48.0 PAROXYSMAL ATRIAL FIBRILLATION (H): ICD-10-CM

## 2022-11-07 ENCOUNTER — LAB REQUISITION (OUTPATIENT)
Dept: LAB | Facility: CLINIC | Age: 87
End: 2022-11-07
Payer: MEDICARE

## 2022-11-07 DIAGNOSIS — Z12.5 ENCOUNTER FOR SCREENING FOR MALIGNANT NEOPLASM OF PROSTATE: ICD-10-CM

## 2022-11-08 LAB
INR PPP: 2.47 (ref 0.85–1.15)
PSA SERPL-MCNC: <0.01 NG/ML

## 2022-11-08 PROCEDURE — P9604 ONE-WAY ALLOW PRORATED TRIP: HCPCS | Mod: ORL | Performed by: UROLOGY

## 2022-11-08 PROCEDURE — P9604 ONE-WAY ALLOW PRORATED TRIP: HCPCS | Mod: ORL | Performed by: FAMILY MEDICINE

## 2022-11-08 PROCEDURE — 36415 COLL VENOUS BLD VENIPUNCTURE: CPT | Mod: ORL | Performed by: FAMILY MEDICINE

## 2022-11-08 PROCEDURE — 84153 ASSAY OF PSA TOTAL: CPT | Mod: ORL | Performed by: UROLOGY

## 2022-11-08 PROCEDURE — 85610 PROTHROMBIN TIME: CPT | Mod: ORL | Performed by: FAMILY MEDICINE

## 2022-11-08 PROCEDURE — G0103 PSA SCREENING: HCPCS | Mod: ORL | Performed by: UROLOGY

## 2022-11-27 ENCOUNTER — LAB REQUISITION (OUTPATIENT)
Dept: LAB | Facility: CLINIC | Age: 87
End: 2022-11-27
Payer: MEDICARE

## 2022-11-27 DIAGNOSIS — I48.0 PAROXYSMAL ATRIAL FIBRILLATION (H): ICD-10-CM

## 2022-12-02 ENCOUNTER — LAB REQUISITION (OUTPATIENT)
Dept: LAB | Facility: CLINIC | Age: 87
End: 2022-12-02
Payer: MEDICARE

## 2022-12-02 DIAGNOSIS — R82.90 UNSPECIFIED ABNORMAL FINDINGS IN URINE: ICD-10-CM

## 2022-12-02 LAB
ALBUMIN UR-MCNC: 30 MG/DL
APPEARANCE UR: CLEAR
BILIRUB UR QL STRIP: NEGATIVE
CAOX CRY #/AREA URNS HPF: ABNORMAL /HPF
COLOR UR AUTO: YELLOW
GLUCOSE UR STRIP-MCNC: NEGATIVE MG/DL
HGB UR QL STRIP: ABNORMAL
HYALINE CASTS: 1 /LPF
KETONES UR STRIP-MCNC: NEGATIVE MG/DL
LEUKOCYTE ESTERASE UR QL STRIP: NEGATIVE
MUCOUS THREADS #/AREA URNS LPF: PRESENT /LPF
NITRATE UR QL: NEGATIVE
PH UR STRIP: 5.5 [PH] (ref 5–7)
RBC URINE: 37 /HPF
SP GR UR STRIP: 1.03 (ref 1–1.03)
SQUAMOUS EPITHELIAL: <1 /HPF
UROBILINOGEN UR STRIP-MCNC: NORMAL MG/DL
WBC URINE: <1 /HPF

## 2022-12-02 PROCEDURE — 87086 URINE CULTURE/COLONY COUNT: CPT | Mod: ORL | Performed by: FAMILY MEDICINE

## 2022-12-02 PROCEDURE — 81001 URINALYSIS AUTO W/SCOPE: CPT | Mod: ORL | Performed by: FAMILY MEDICINE

## 2022-12-04 LAB — BACTERIA UR CULT: NORMAL

## 2022-12-05 ENCOUNTER — LAB REQUISITION (OUTPATIENT)
Dept: LAB | Facility: CLINIC | Age: 87
End: 2022-12-05
Payer: MEDICARE

## 2022-12-05 DIAGNOSIS — I48.0 PAROXYSMAL ATRIAL FIBRILLATION (H): ICD-10-CM

## 2022-12-06 LAB — INR PPP: 2.9 (ref 0.85–1.15)

## 2022-12-06 PROCEDURE — 36415 COLL VENOUS BLD VENIPUNCTURE: CPT | Mod: ORL | Performed by: FAMILY MEDICINE

## 2022-12-06 PROCEDURE — 85610 PROTHROMBIN TIME: CPT | Mod: ORL | Performed by: FAMILY MEDICINE

## 2022-12-06 PROCEDURE — P9603 ONE-WAY ALLOW PRORATED MILES: HCPCS | Mod: ORL | Performed by: FAMILY MEDICINE

## 2022-12-17 ENCOUNTER — LAB REQUISITION (OUTPATIENT)
Dept: LAB | Facility: CLINIC | Age: 87
End: 2022-12-17
Payer: MEDICARE

## 2022-12-17 DIAGNOSIS — I48.0 PAROXYSMAL ATRIAL FIBRILLATION (H): ICD-10-CM

## 2022-12-20 LAB — INR PPP: 2.66 (ref 0.85–1.15)

## 2022-12-20 PROCEDURE — P9604 ONE-WAY ALLOW PRORATED TRIP: HCPCS | Mod: ORL | Performed by: FAMILY MEDICINE

## 2022-12-20 PROCEDURE — 36415 COLL VENOUS BLD VENIPUNCTURE: CPT | Mod: ORL | Performed by: FAMILY MEDICINE

## 2022-12-20 PROCEDURE — 85610 PROTHROMBIN TIME: CPT | Mod: ORL | Performed by: FAMILY MEDICINE

## 2023-01-06 ENCOUNTER — LAB REQUISITION (OUTPATIENT)
Dept: LAB | Facility: CLINIC | Age: 88
End: 2023-01-06
Payer: MEDICARE

## 2023-01-06 DIAGNOSIS — I48.0 PAROXYSMAL ATRIAL FIBRILLATION (H): ICD-10-CM

## 2023-01-10 LAB — INR PPP: 2.91 (ref 0.85–1.15)

## 2023-01-10 PROCEDURE — 36415 COLL VENOUS BLD VENIPUNCTURE: CPT | Mod: ORL | Performed by: FAMILY MEDICINE

## 2023-01-10 PROCEDURE — P9603 ONE-WAY ALLOW PRORATED MILES: HCPCS | Mod: ORL | Performed by: FAMILY MEDICINE

## 2023-01-10 PROCEDURE — 85610 PROTHROMBIN TIME: CPT | Mod: ORL | Performed by: FAMILY MEDICINE

## 2023-01-29 ENCOUNTER — LAB REQUISITION (OUTPATIENT)
Dept: LAB | Facility: CLINIC | Age: 88
End: 2023-01-29
Payer: MEDICARE

## 2023-01-29 DIAGNOSIS — I48.0 PAROXYSMAL ATRIAL FIBRILLATION (H): ICD-10-CM

## 2023-01-31 LAB — INR PPP: 2.59 (ref 0.85–1.15)

## 2023-01-31 PROCEDURE — 85610 PROTHROMBIN TIME: CPT | Mod: ORL | Performed by: FAMILY MEDICINE

## 2023-01-31 PROCEDURE — 36415 COLL VENOUS BLD VENIPUNCTURE: CPT | Mod: ORL | Performed by: FAMILY MEDICINE

## 2023-01-31 PROCEDURE — P9603 ONE-WAY ALLOW PRORATED MILES: HCPCS | Mod: ORL | Performed by: FAMILY MEDICINE

## 2023-02-13 ENCOUNTER — LAB REQUISITION (OUTPATIENT)
Dept: LAB | Facility: CLINIC | Age: 88
End: 2023-02-13
Payer: MEDICARE

## 2023-02-13 DIAGNOSIS — I10 ESSENTIAL (PRIMARY) HYPERTENSION: ICD-10-CM

## 2023-02-13 DIAGNOSIS — E55.9 VITAMIN D DEFICIENCY, UNSPECIFIED: ICD-10-CM

## 2023-02-14 LAB
ALBUMIN SERPL BCG-MCNC: 4.2 G/DL (ref 3.5–5.2)
ALP SERPL-CCNC: 57 U/L (ref 40–129)
ALT SERPL W P-5'-P-CCNC: 15 U/L (ref 10–50)
ANION GAP SERPL CALCULATED.3IONS-SCNC: 15 MMOL/L (ref 7–15)
AST SERPL W P-5'-P-CCNC: 24 U/L (ref 10–50)
BILIRUB SERPL-MCNC: 0.9 MG/DL
BUN SERPL-MCNC: 17.6 MG/DL (ref 8–23)
CALCIUM SERPL-MCNC: 10 MG/DL (ref 8.8–10.2)
CHLORIDE SERPL-SCNC: 110 MMOL/L (ref 98–107)
CREAT SERPL-MCNC: 0.85 MG/DL (ref 0.67–1.17)
DEPRECATED CALCIDIOL+CALCIFEROL SERPL-MC: 40 UG/L (ref 20–75)
DEPRECATED HCO3 PLAS-SCNC: 19 MMOL/L (ref 22–29)
ERYTHROCYTE [DISTWIDTH] IN BLOOD BY AUTOMATED COUNT: 14.6 % (ref 10–15)
GFR SERPL CREATININE-BSD FRML MDRD: 85 ML/MIN/1.73M2
GLUCOSE SERPL-MCNC: 81 MG/DL (ref 70–99)
HCT VFR BLD AUTO: 43.7 % (ref 40–53)
HGB BLD-MCNC: 13.9 G/DL (ref 13.3–17.7)
INR PPP: 2.1 (ref 0.85–1.15)
MCH RBC QN AUTO: 30.9 PG (ref 26.5–33)
MCHC RBC AUTO-ENTMCNC: 31.8 G/DL (ref 31.5–36.5)
MCV RBC AUTO: 97 FL (ref 78–100)
PLATELET # BLD AUTO: 138 10E3/UL (ref 150–450)
POTASSIUM SERPL-SCNC: 4.5 MMOL/L (ref 3.4–5.3)
PROT SERPL-MCNC: 6.2 G/DL (ref 6.4–8.3)
RBC # BLD AUTO: 4.5 10E6/UL (ref 4.4–5.9)
SODIUM SERPL-SCNC: 144 MMOL/L (ref 136–145)
WBC # BLD AUTO: 6.2 10E3/UL (ref 4–11)

## 2023-02-14 PROCEDURE — 85610 PROTHROMBIN TIME: CPT | Mod: ORL

## 2023-02-14 PROCEDURE — 36415 COLL VENOUS BLD VENIPUNCTURE: CPT | Mod: ORL

## 2023-02-14 PROCEDURE — 80053 COMPREHEN METABOLIC PANEL: CPT | Mod: ORL

## 2023-02-14 PROCEDURE — P9603 ONE-WAY ALLOW PRORATED MILES: HCPCS | Mod: ORL

## 2023-02-14 PROCEDURE — 85027 COMPLETE CBC AUTOMATED: CPT | Mod: ORL

## 2023-02-14 PROCEDURE — 82306 VITAMIN D 25 HYDROXY: CPT | Mod: ORL

## 2023-03-04 ENCOUNTER — LAB REQUISITION (OUTPATIENT)
Dept: LAB | Facility: CLINIC | Age: 88
End: 2023-03-04
Payer: MEDICARE

## 2023-03-04 DIAGNOSIS — I48.0 PAROXYSMAL ATRIAL FIBRILLATION (H): ICD-10-CM

## 2023-03-07 LAB — INR PPP: 2.14 (ref 0.85–1.15)

## 2023-03-07 PROCEDURE — 85610 PROTHROMBIN TIME: CPT | Mod: ORL | Performed by: FAMILY MEDICINE

## 2023-03-07 PROCEDURE — P9603 ONE-WAY ALLOW PRORATED MILES: HCPCS | Mod: ORL | Performed by: FAMILY MEDICINE

## 2023-03-07 PROCEDURE — 36415 COLL VENOUS BLD VENIPUNCTURE: CPT | Mod: ORL | Performed by: FAMILY MEDICINE

## 2023-03-26 ENCOUNTER — LAB REQUISITION (OUTPATIENT)
Dept: LAB | Facility: CLINIC | Age: 88
End: 2023-03-26
Payer: MEDICARE

## 2023-03-26 DIAGNOSIS — I48.0 PAROXYSMAL ATRIAL FIBRILLATION (H): ICD-10-CM

## 2023-03-28 LAB — INR PPP: 2.04 (ref 0.85–1.15)

## 2023-03-28 PROCEDURE — 36415 COLL VENOUS BLD VENIPUNCTURE: CPT | Mod: ORL | Performed by: FAMILY MEDICINE

## 2023-03-28 PROCEDURE — P9603 ONE-WAY ALLOW PRORATED MILES: HCPCS | Mod: ORL | Performed by: FAMILY MEDICINE

## 2023-03-28 PROCEDURE — 85610 PROTHROMBIN TIME: CPT | Mod: ORL | Performed by: FAMILY MEDICINE

## 2023-04-24 ENCOUNTER — LAB REQUISITION (OUTPATIENT)
Dept: LAB | Facility: CLINIC | Age: 88
End: 2023-04-24
Payer: MEDICARE

## 2023-04-24 DIAGNOSIS — I48.0 PAROXYSMAL ATRIAL FIBRILLATION (H): ICD-10-CM

## 2023-04-25 LAB — INR PPP: 2.7 (ref 0.85–1.15)

## 2023-04-25 PROCEDURE — 85610 PROTHROMBIN TIME: CPT | Mod: ORL | Performed by: FAMILY MEDICINE

## 2023-04-25 PROCEDURE — 36415 COLL VENOUS BLD VENIPUNCTURE: CPT | Mod: ORL | Performed by: FAMILY MEDICINE

## 2023-04-25 PROCEDURE — P9603 ONE-WAY ALLOW PRORATED MILES: HCPCS | Mod: ORL | Performed by: FAMILY MEDICINE

## 2023-05-15 ENCOUNTER — LAB REQUISITION (OUTPATIENT)
Dept: LAB | Facility: CLINIC | Age: 88
End: 2023-05-15
Payer: MEDICARE

## 2023-05-15 DIAGNOSIS — I48.0 PAROXYSMAL ATRIAL FIBRILLATION (H): ICD-10-CM

## 2023-05-15 DIAGNOSIS — G93.32 MYALGIC ENCEPHALOMYELITIS/CHRONIC FATIGUE SYNDROME: ICD-10-CM

## 2023-05-16 LAB
ALBUMIN SERPL BCG-MCNC: 4.4 G/DL (ref 3.5–5.2)
ALP SERPL-CCNC: 65 U/L (ref 40–129)
ALT SERPL W P-5'-P-CCNC: 15 U/L (ref 10–50)
ANION GAP SERPL CALCULATED.3IONS-SCNC: 14 MMOL/L (ref 7–15)
AST SERPL W P-5'-P-CCNC: 26 U/L (ref 10–50)
BILIRUB SERPL-MCNC: 0.7 MG/DL
BUN SERPL-MCNC: 17.8 MG/DL (ref 8–23)
CALCIUM SERPL-MCNC: 10.6 MG/DL (ref 8.8–10.2)
CHLORIDE SERPL-SCNC: 108 MMOL/L (ref 98–107)
CREAT SERPL-MCNC: 0.83 MG/DL (ref 0.67–1.17)
DEPRECATED HCO3 PLAS-SCNC: 21 MMOL/L (ref 22–29)
ERYTHROCYTE [DISTWIDTH] IN BLOOD BY AUTOMATED COUNT: 14.6 % (ref 10–15)
GFR SERPL CREATININE-BSD FRML MDRD: 86 ML/MIN/1.73M2
GLUCOSE SERPL-MCNC: 88 MG/DL (ref 70–99)
HCT VFR BLD AUTO: 46.4 % (ref 40–53)
HGB BLD-MCNC: 14.7 G/DL (ref 13.3–17.7)
INR PPP: 2.4 (ref 0.85–1.15)
MCH RBC QN AUTO: 29.9 PG (ref 26.5–33)
MCHC RBC AUTO-ENTMCNC: 31.7 G/DL (ref 31.5–36.5)
MCV RBC AUTO: 94 FL (ref 78–100)
PLATELET # BLD AUTO: 199 10E3/UL (ref 150–450)
POTASSIUM SERPL-SCNC: 4.4 MMOL/L (ref 3.4–5.3)
PROT SERPL-MCNC: 7 G/DL (ref 6.4–8.3)
RBC # BLD AUTO: 4.92 10E6/UL (ref 4.4–5.9)
SODIUM SERPL-SCNC: 143 MMOL/L (ref 136–145)
TSH SERPL DL<=0.005 MIU/L-ACNC: 1.75 UIU/ML (ref 0.3–4.2)
WBC # BLD AUTO: 6.3 10E3/UL (ref 4–11)

## 2023-05-16 PROCEDURE — 85027 COMPLETE CBC AUTOMATED: CPT | Mod: ORL | Performed by: FAMILY MEDICINE

## 2023-05-16 PROCEDURE — P9604 ONE-WAY ALLOW PRORATED TRIP: HCPCS | Mod: ORL | Performed by: FAMILY MEDICINE

## 2023-05-16 PROCEDURE — 80053 COMPREHEN METABOLIC PANEL: CPT | Mod: ORL | Performed by: FAMILY MEDICINE

## 2023-05-16 PROCEDURE — 84443 ASSAY THYROID STIM HORMONE: CPT | Mod: ORL | Performed by: FAMILY MEDICINE

## 2023-05-16 PROCEDURE — 36415 COLL VENOUS BLD VENIPUNCTURE: CPT | Mod: ORL | Performed by: FAMILY MEDICINE

## 2023-05-16 PROCEDURE — 85610 PROTHROMBIN TIME: CPT | Mod: ORL | Performed by: FAMILY MEDICINE

## 2023-06-12 ENCOUNTER — LAB REQUISITION (OUTPATIENT)
Dept: LAB | Facility: CLINIC | Age: 88
End: 2023-06-12
Payer: MEDICARE

## 2023-06-12 DIAGNOSIS — I48.0 PAROXYSMAL ATRIAL FIBRILLATION (H): ICD-10-CM

## 2023-06-13 LAB — INR PPP: 2.34 (ref 0.85–1.15)

## 2023-06-13 PROCEDURE — P9603 ONE-WAY ALLOW PRORATED MILES: HCPCS | Mod: ORL

## 2023-06-13 PROCEDURE — 36415 COLL VENOUS BLD VENIPUNCTURE: CPT | Mod: ORL

## 2023-06-13 PROCEDURE — 85610 PROTHROMBIN TIME: CPT | Mod: ORL

## 2023-06-26 ENCOUNTER — MEDICAL CORRESPONDENCE (OUTPATIENT)
Dept: HEALTH INFORMATION MANAGEMENT | Facility: CLINIC | Age: 88
End: 2023-06-26
Payer: MEDICARE

## 2023-07-10 ENCOUNTER — LAB REQUISITION (OUTPATIENT)
Dept: LAB | Facility: CLINIC | Age: 88
End: 2023-07-10
Payer: MEDICARE

## 2023-07-10 DIAGNOSIS — I48.0 PAROXYSMAL ATRIAL FIBRILLATION (H): ICD-10-CM

## 2023-07-11 ENCOUNTER — OFFICE VISIT (OUTPATIENT)
Dept: CARDIOLOGY | Facility: CLINIC | Age: 88
End: 2023-07-11
Payer: MEDICARE

## 2023-07-11 VITALS
HEART RATE: 40 BPM | DIASTOLIC BLOOD PRESSURE: 63 MMHG | SYSTOLIC BLOOD PRESSURE: 142 MMHG | RESPIRATION RATE: 20 BRPM | OXYGEN SATURATION: 96 %

## 2023-07-11 DIAGNOSIS — I34.0 MITRAL VALVE INSUFFICIENCY, UNSPECIFIED ETIOLOGY: ICD-10-CM

## 2023-07-11 DIAGNOSIS — I50.22 CHRONIC SYSTOLIC HEART FAILURE (H): ICD-10-CM

## 2023-07-11 DIAGNOSIS — I48.21 PERMANENT ATRIAL FIBRILLATION (H): Primary | ICD-10-CM

## 2023-07-11 LAB — INR PPP: 2.54 (ref 0.85–1.15)

## 2023-07-11 PROCEDURE — P9604 ONE-WAY ALLOW PRORATED TRIP: HCPCS | Mod: ORL

## 2023-07-11 PROCEDURE — 85610 PROTHROMBIN TIME: CPT | Mod: ORL

## 2023-07-11 PROCEDURE — 36415 COLL VENOUS BLD VENIPUNCTURE: CPT | Mod: ORL

## 2023-07-11 PROCEDURE — 99204 OFFICE O/P NEW MOD 45 MIN: CPT | Performed by: INTERNAL MEDICINE

## 2023-07-11 RX ORDER — MULTIVITAMIN,THERAPEUTIC
1 TABLET ORAL DAILY
COMMUNITY

## 2023-07-11 RX ORDER — ACETAMINOPHEN 500 MG
1000 TABLET ORAL 3 TIMES DAILY
COMMUNITY

## 2023-07-11 RX ORDER — WARFARIN SODIUM 2.5 MG/1
2.5 TABLET ORAL
COMMUNITY

## 2023-07-11 RX ORDER — METOPROLOL TARTRATE 25 MG/1
12.5 TABLET, FILM COATED ORAL DAILY
COMMUNITY
End: 2023-09-22

## 2023-07-11 RX ORDER — WARFARIN SODIUM 1 MG/1
1 TABLET ORAL
COMMUNITY

## 2023-07-11 RX ORDER — AMOXICILLIN 500 MG/1
4 CAPSULE ORAL
COMMUNITY

## 2023-07-11 RX ORDER — MELATONIN 10 MG
1 CAPSULE ORAL
COMMUNITY

## 2023-07-11 RX ORDER — NYSTATIN 100000 [USP'U]/G
POWDER TOPICAL
COMMUNITY

## 2023-07-11 RX ORDER — POLYETHYLENE GLYCOL 3350 17 G/17G
1 POWDER, FOR SOLUTION ORAL PRN
COMMUNITY

## 2023-07-11 RX ORDER — PRAVASTATIN SODIUM 80 MG/1
80 TABLET ORAL AT BEDTIME
COMMUNITY

## 2023-07-11 RX ORDER — CHOLESTYRAMINE 4 G/9G
POWDER, FOR SUSPENSION ORAL
COMMUNITY

## 2023-07-11 RX ORDER — ONDANSETRON 4 MG/1
4 TABLET, FILM COATED ORAL PRN
COMMUNITY

## 2023-07-11 RX ORDER — CHOLECALCIFEROL (VITAMIN D3) 50 MCG
1 TABLET ORAL DAILY
COMMUNITY

## 2023-07-11 RX ORDER — MULTIVIT-MIN/IRON/FOLIC ACID/K 18-600-40
500 CAPSULE ORAL DAILY
COMMUNITY

## 2023-07-11 NOTE — LETTER
2023    [facility]  [address]  [place] [address]    RE: Antonio White       Dear Colleague,     I had the pleasure of seeing Antonio White in the [facility].     [facility]  Cardiac Electrophysiology  [address]  [place] [address]   Office: [phone]  Fax: [phone]     Cardiac Electrophysiology Consultation    Patient: Antonio White   : 1935     Referring Provider: No ref. provider found  Primary Care Provider: Everton Lucero MD    CHIEF COMPLAINT/REASON FOR CONSULTATION  Permanent atrial fibrillation    Assessment/Recommendations   Antonio White is a 88 year old male with permanent atrial fibrillation, RBBB, systolic heart failure (LVEF 41% by 2018 TTE), moderate-severe mitral regurgitation, HTN, CAD with reported prior MI, CKD, anemia, dementia, history of prostate cancer referred by for consultation regarding atrial fibrillation.    Permanent atrial fibrillation - asymptomatic, present since at least 3/2/2016, rates via intermittent vital signs appear to be well controlled  [id number]  - continue metoprolol  - continue warfarin  - 7 day mobile cardiac telemetry for longer term assessment of ambulatory ventricular rates    Systolic heart failure - LVEF 41% by 2018 TTE, moderate-severe mitral regurgitation  Suspect possibly worsened ventricular function  - echocardiogram  - follow-up visit with Dr. Garcia requested     Follow up: as above, further EP follow-up as needed         History of Present Illness   Antonio White is a 88 year old male with permanent atrial fibrillation, RBBB, systolic heart failure (LVEF 41% by 2018 TTE), moderate-severe mitral regurgitation, HTN, CAD with reported prior MI, CKD, anemia, dementia, history of prostate cancer referred by for consultation regarding atrial fibrillation.    Mr. White was last noted to be in SR by 2014 ECG, with  ECGs from 3/2/2016 onwards all showing atrial fibrillation - this has been accepted as permenent.  He lives at an assisted living facility in [place] - he has been noted to have more dyspnea, variable blood pressure.  Vital signs provided from the facility show HR 56-72bpm, -159/79-93mmHg.  He was noted to have some dizziness and malaise - EMS evaluation noted variable BP in R vs LUE.    He has been participating in PT and OT - at times this has had to be stopped due to high and low BP.  He walks short distances with a walker, and is in a wheelchair for 90% of the day.  He notes some positional chest pains.  He has had prior falls, though none over the past 3 years.  He is seen today with his daughter.       Physical Examination  Review of Systems   VITALS: BP (!) 142/63 (BP Location: Left arm, Patient Position: Sitting, Cuff Size: Adult Regular)   Pulse (!) 40   Resp 20   SpO2 96%  LUE /63, RUE /66  Wt Readings from Last 3 Encounters:   09/17/20 57.2 kg (126 lb)   09/08/20 57.5 kg (126 lb 12.8 oz)   09/03/20 57.8 kg (127 lb 6.4 oz)     CONSTITUTIONAL:  comfortable, no distress  EYES:  Conjunctivae pink, sclerae clear.    E/N/T:  Oral mucosa pink  RESPIRATORY:  Respiratory effort is normal  CARDIOVASCULAR:  Irregular, normal rate, normal S1 and S2, pulses feel symmetric in RUE and LUE  GASTROINTESTINAL:  Abdomen without masses or tenderness  EXTREMITIES:  No clubbing or cyanosis.    MUSCULOSKELETAL:  In wheelchair  SKIN:  Overall, skin warm and dry, no lesions.  NEURO/PSYCH:  Oriented x 3 with normal affect.   Constitutional:  No weight loss or loss of appetite    Eyes:  No difficulty with vision, no double vision, no dry eyes  ENT:  No sore throat, difficulty swallowing; changes in hearing or tinnitus  Cardiovascular: As detailed above  Respiratory:  No cough  Musculoskeletal  No joint pain, muscle aches  Neurologic:  No syncope, lightheadedness, fainting spells   Hematologic: No easy bruising,  excessive bleeding tendency   Gastrointestinal:  No jaundice, abdominal pain or abdominal bloating  Genitourinary: No changes in urinary habits, no trouble urinating    Psychiatric: No anxiety or depression      Medical History  Surgical History   Past Medical History:   Diagnosis Date    Anemia     Aortic regurgitation     Atrial fibrillation (H)     Cardiac murmur     Chronic kidney disease     stage 3    Coronary artery disease     GERD (gastroesophageal reflux disease)     Hearing loss     History of prostate cancer     Hyperlipidemia     Hypertension     Malignant melanoma (H)     left chest    Malignant melanoma (H)     MI (myocardial infarction) (H) 2013    x1    Mitral valve prolapse     Osteoporosis     Personal history of thromboembolic disease 3/6/2016    History thromboembolic MI in 2013; continue indefinite anticoagulation unless risks outweight benefits.    Prostate cancer (H)     had radioactive seed treatment    Sleep apnea     no CPAP    Past Surgical History:   Procedure Laterality Date    COMBINED CYSTOSCOPY, INSERT STENT URETER(S) Right 10/31/2014    Procedure: CYSTOSCOPY, RIGHT URETEROSCOPY AND STENT INSERTION;  Surgeon: Chico Nur MD;  Location: [facility];  Service:     HERNIA REPAIR      left inguinal    JOINT REPLACEMENT Left     CHRIS    MOHS MICROGRAPHIC PROCEDURE  2009    OTHER SURGICAL HISTORY      melanoma surgeryRemoval of melanoma on chest.  Cleared by surgeon 1 year ago.    PARTIAL HIP ARTHROPLASTY Left 3/4/2016    Procedure: LEFT HIP FRACTURE BIPOLAR ;  Surgeon: Nichole Menon MD;  [place] [facility]  Service:     IN ANESTH,REPAIR UPPER ABD HERNIA NOS Bilateral     ingiunal     IN CYSTO/URETERO W/LITHOTRIPSY &INDWELL STENT INSRT Bilateral 2/12/2018    Procedure: CYSTOSCOPY, BILATERAL URETEROSCOPY, LASER LITHOTRIPSY STONE EXTRACTION, URETERAL STENT PLACEMENT;  Surgeon: Lincoln Srinivasan MD;  [place] [facility] [place]  Service: Urology    IN  CYSTO/URETERO W/LITHOTRIPSY &INDWELL STENT INSRT Bilateral 3/5/2018    Procedure: CYSTOSCOPY, BILATERAL URETEROSCOPY, LASER LITHOTRIPSY, BASKET EXTRACTION, BILATERAL URETERAL STENT EXCHANGE;  Surgeon: Lincoln Srinivasan MD;  Location: [facility];  Service: Urology    IN CYSTOURETHROSCOPY,URETER CATHETER Bilateral 2/12/2018    Procedure: CYSTOSCOPY, WITH RETROGRADE PYELOGRAM;  Surgeon: Lincoln Srinivasan MD;  Location: [facility];  Service: Urology    TONSILLECTOMY      at age 7    TONSILLECTOMY, ADENOIDECTOMY, MYRINGOTOMY, INSERT TUBE BILATERAL, COMBINED      ZZHC COLONOSCOPY W/WO BRUSH/WASH N/A 8/14/2020    Procedure: COLONOSCOPY;  Surgeon: Giancarlo Cardona MD;  Location: [facility];  Service: Gastroenterology         Family History Social History   Family History   Problem Relation Age of Onset    Heart Disease Mother         hypertension    Hypertension Mother     Prostate Cancer Father     Cancer Father         mouth and throat    Cancer Sister         kidney    Cancer Daughter         skin    Cancer Daughter         skin        Social History     Tobacco Use    Smoking status: Never    Smokeless tobacco: Never   Substance Use Topics    Alcohol use: Yes     Alcohol/week: 4.0 standard drinks of alcohol     Comment: Alcoholic Drinks/day: 3-4 glasses wine weekly    Drug use: No         Medications  Allergies   No current outpatient medications on file.     Allergies   Allergen Reactions    Levaquin [Levofloxacin]           Lab Results    Chemistry CBC Cardiac Enzymes/BNP/TSH/INR   Recent Labs   Lab Test 05/16/23  1200      POTASSIUM 4.4   CHLORIDE 108*   CO2 21*   GLC 88   BUN 17.8   CR 0.83   GFRESTIMATED 86   ARIEL 10.6*     Recent Labs   Lab Test 05/16/23  1200 02/14/23  1058 08/09/22  1521   CR 0.83 0.85 0.93          Recent Labs   Lab Test 05/16/23  1200   WBC 6.3   HGB 14.7   HCT 46.4   MCV 94        Recent Labs   Lab Test 05/16/23  1200 02/14/23  1058 08/09/22  1952   HGB  14.7 13.9 13.7    Recent Labs   Lab Test 08/12/20  1621 10/30/19  1135   TROPONINI 0.02 0.03     No results for input(s): BNP, NTBNPI, NTBNP in the last 49967 hours.  Recent Labs   Lab Test 05/16/23  1200   TSH 1.75     Recent Labs   Lab Test 06/13/23  1149 05/16/23  1200 04/25/23  1155   INR 2.34* 2.40* 2.70*         Data Review    ECGs (tracings independently reviewed)  2/20/2021 - AF, ventricular rate 87bpm, RBBB QRS 126ms  8/12/2020 - AF, ventricular rate 69bpm, RBBB  10/30/2019 - AF  3/2/2016 - AF  11/14/2014 - SR 62bpm    12/13/2018 TTE    1.Left ventricle ejection fraction is moderately decreased. The calculated left ventricular ejection fraction is 41%.    2.TAPSE is abnormal, which is consistent with abnormal right ventricular systolic function.    3.Severe enlargement left atrium and moderate enlargement right atrium.    4.The sinuses of Valsalva is mildly dilated. The aortic root is mildly dilated. The ascending aorta is mildly dilated.    5.Patent foramen ovale present with left to right shunting indicated by color flow Doppler.    6.Moderate to severe mitral regurgitation. There is mild prolapse of the anterior mitral leaflet. There is mild prolapse of the posterior mitral leaflet.    7.When compared to the previous study dated 11/15/2016, left ventricular ejection fraction might be slightly lower, pulmonary hypertension is present, aortic and mitral insufficiency does appear worse.       Cc: Joey Garcia MD, Everton Lucero MD Amila Dilusha William, MD  7/11/2023  4:08 PM          Thank you for allowing me to participate in the care of your patient.      Sincerely,     Lai Bowling MD     [facility]  cc:   No referring provider defined for this encounter.

## 2023-07-11 NOTE — PROGRESS NOTES
[facility]  Cardiac Electrophysiology  [address]  [place] [address]   Office: [phone]  Fax: [phone]     Cardiac Electrophysiology Consultation    Patient: Antonio White   : 1935     Referring Provider: No ref. provider found  Primary Care Provider: Everton Lucero MD    CHIEF COMPLAINT/REASON FOR CONSULTATION  Permanent atrial fibrillation    Assessment/Recommendations   Antonio White is a 88 year old male with permanent atrial fibrillation, RBBB, systolic heart failure (LVEF 41% by 2018 TTE), moderate-severe mitral regurgitation, HTN, CAD with reported prior MI, CKD, anemia, dementia, history of prostate cancer referred by for consultation regarding atrial fibrillation.    Permanent atrial fibrillation - asymptomatic, present since at least 3/2/2016, rates via intermittent vital signs appear to be well controlled  [id number]  - continue metoprolol  - continue warfarin  - 7 day mobile cardiac telemetry for longer term assessment of ambulatory ventricular rates    Systolic heart failure - LVEF 41% by 2018 TTE, moderate-severe mitral regurgitation  Suspect possibly worsened ventricular function  - echocardiogram  - follow-up visit with Dr. Garcia requested     Follow up: as above, further EP follow-up as needed         History of Present Illness   Antonio hWite is a 88 year old male with permanent atrial fibrillation, RBBB, systolic heart failure (LVEF 41% by 2018 TTE), moderate-severe mitral regurgitation, HTN, CAD with reported prior MI, CKD, anemia, dementia, history of prostate cancer referred by for consultation regarding atrial fibrillation.    Mr. White was last noted to be in SR by 2014 ECG, with ECGs from 3/2/2016 onwards all showing atrial fibrillation - this has been accepted as permenent.  He lives at an assisted living facility in [place] - he has been noted to have more dyspnea, variable blood pressure.  Vital  signs provided from the facility show HR 56-72bpm, -159/79-93mmHg.  He was noted to have some dizziness and malaise - EMS evaluation noted variable BP in R vs LUE.    He has been participating in PT and OT - at times this has had to be stopped due to high and low BP.  He walks short distances with a walker, and is in a wheelchair for 90% of the day.  He notes some positional chest pains.  He has had prior falls, though none over the past 3 years.  He is seen today with his daughter.       Physical Examination  Review of Systems   VITALS: BP (!) 142/63 (BP Location: Left arm, Patient Position: Sitting, Cuff Size: Adult Regular)   Pulse (!) 40   Resp 20   SpO2 96%  LUE /63, RUE /66  Wt Readings from Last 3 Encounters:   09/17/20 57.2 kg (126 lb)   09/08/20 57.5 kg (126 lb 12.8 oz)   09/03/20 57.8 kg (127 lb 6.4 oz)     CONSTITUTIONAL:  comfortable, no distress  EYES:  Conjunctivae pink, sclerae clear.    E/N/T:  Oral mucosa pink  RESPIRATORY:  Respiratory effort is normal  CARDIOVASCULAR:  Irregular, normal rate, normal S1 and S2, pulses feel symmetric in RUE and LUE  GASTROINTESTINAL:  Abdomen without masses or tenderness  EXTREMITIES:  No clubbing or cyanosis.    MUSCULOSKELETAL:  In wheelchair  SKIN:  Overall, skin warm and dry, no lesions.  NEURO/PSYCH:  Oriented x 3 with normal affect.   Constitutional:  No weight loss or loss of appetite    Eyes:  No difficulty with vision, no double vision, no dry eyes  ENT:  No sore throat, difficulty swallowing; changes in hearing or tinnitus  Cardiovascular: As detailed above  Respiratory:  No cough  Musculoskeletal  No joint pain, muscle aches  Neurologic:  No syncope, lightheadedness, fainting spells   Hematologic: No easy bruising, excessive bleeding tendency   Gastrointestinal:  No jaundice, abdominal pain or abdominal bloating  Genitourinary: No changes in urinary habits, no trouble urinating    Psychiatric: No anxiety or depression      Medical  History  Surgical History   Past Medical History:   Diagnosis Date     Anemia      Aortic regurgitation      Atrial fibrillation (H)      Cardiac murmur      Chronic kidney disease     stage 3     Coronary artery disease      GERD (gastroesophageal reflux disease)      Hearing loss      History of prostate cancer      Hyperlipidemia      Hypertension      Malignant melanoma (H)     left chest     Malignant melanoma (H)      MI (myocardial infarction) (H) 2013    x1     Mitral valve prolapse      Osteoporosis      Personal history of thromboembolic disease 3/6/2016    History thromboembolic MI in 2013; continue indefinite anticoagulation unless risks outweight benefits.     Prostate cancer (H)     had radioactive seed treatment     Sleep apnea     no CPAP    Past Surgical History:   Procedure Laterality Date     COMBINED CYSTOSCOPY, INSERT STENT URETER(S) Right 10/31/2014    Procedure: CYSTOSCOPY, RIGHT URETEROSCOPY AND STENT INSERTION;  Surgeon: Chico Nur MD;  Location: [facility];  Service:      HERNIA REPAIR      left inguinal     JOINT REPLACEMENT Left     CHRIS     MOHS MICROGRAPHIC PROCEDURE  2009     OTHER SURGICAL HISTORY      melanoma surgeryRemoval of melanoma on chest.  Cleared by surgeon 1 year ago.     PARTIAL HIP ARTHROPLASTY Left 3/4/2016    Procedure: LEFT HIP FRACTURE BIPOLAR ;  Surgeon: Nichole Menon MD;  Location: [facility];  Service:      WA ANESTH,REPAIR UPPER ABD HERNIA NOS Bilateral     ingiunal      WA CYSTO/URETERO W/LITHOTRIPSY &INDWELL STENT INSRT Bilateral 2/12/2018    Procedure: CYSTOSCOPY, BILATERAL URETEROSCOPY, LASER LITHOTRIPSY STONE EXTRACTION, URETERAL STENT PLACEMENT;  Surgeon: Lincoln Srinivasan MD;  Location: [facility];  Service: Urology     WA CYSTO/URETERO W/LITHOTRIPSY &INDWELL STENT INSRT Bilateral 3/5/2018    Procedure: CYSTOSCOPY, BILATERAL URETEROSCOPY, LASER LITHOTRIPSY, BASKET EXTRACTION, BILATERAL URETERAL STENT EXCHANGE;  Surgeon:  Lincoln Srinivasan MD;  Location: [facility];  Service: Urology     SC CYSTOURETHROSCOPY,URETER CATHETER Bilateral 2/12/2018    Procedure: CYSTOSCOPY, WITH RETROGRADE PYELOGRAM;  Surgeon: Lincoln Srinivasan MD;  Location: [facility];  Service: Urology     TONSILLECTOMY      at age 7     TONSILLECTOMY, ADENOIDECTOMY, MYRINGOTOMY, INSERT TUBE BILATERAL, COMBINED       ZZHC COLONOSCOPY W/WO BRUSH/WASH N/A 8/14/2020    Procedure: COLONOSCOPY;  Surgeon: Giancarlo Cardona MD;  Location: [facility];  Service: Gastroenterology         Family History Social History   Family History   Problem Relation Age of Onset     Heart Disease Mother         hypertension     Hypertension Mother      Prostate Cancer Father      Cancer Father         mouth and throat     Cancer Sister         kidney     Cancer Daughter         skin     Cancer Daughter         skin        Social History     Tobacco Use     Smoking status: Never     Smokeless tobacco: Never   Substance Use Topics     Alcohol use: Yes     Alcohol/week: 4.0 standard drinks of alcohol     Comment: Alcoholic Drinks/day: 3-4 glasses wine weekly     Drug use: No         Medications  Allergies   No current outpatient medications on file.     Allergies   Allergen Reactions     Levaquin [Levofloxacin]           Lab Results    Chemistry CBC Cardiac Enzymes/BNP/TSH/INR   Recent Labs   Lab Test 05/16/23  1200      POTASSIUM 4.4   CHLORIDE 108*   CO2 21*   GLC 88   BUN 17.8   CR 0.83   GFRESTIMATED 86   ARIEL 10.6*     Recent Labs   Lab Test 05/16/23  1200 02/14/23  1058 08/09/22  1521   CR 0.83 0.85 0.93          Recent Labs   Lab Test 05/16/23  1200   WBC 6.3   HGB 14.7   HCT 46.4   MCV 94        Recent Labs   Lab Test 05/16/23  1200 02/14/23  1058 08/09/22  1952   HGB 14.7 13.9 13.7    Recent Labs   Lab Test 08/12/20  1621 10/30/19  1135   TROPONINI 0.02 0.03     No results for input(s): BNP, NTBNPI, NTBNP in the last 51399 hours.  Recent Labs   Lab  Test 05/16/23  1200   TSH 1.75     Recent Labs   Lab Test 06/13/23  1149 05/16/23  1200 04/25/23  1155   INR 2.34* 2.40* 2.70*         Data Review    ECGs (tracings independently reviewed)  2/20/2021 - AF, ventricular rate 87bpm, RBBB QRS 126ms  8/12/2020 - AF, ventricular rate 69bpm, RBBB  10/30/2019 - AF  3/2/2016 - AF  11/14/2014 - SR 62bpm    12/13/2018 TTE    1.Left ventricle ejection fraction is moderately decreased. The calculated left ventricular ejection fraction is 41%.    2.TAPSE is abnormal, which is consistent with abnormal right ventricular systolic function.    3.Severe enlargement left atrium and moderate enlargement right atrium.    4.The sinuses of Valsalva is mildly dilated. The aortic root is mildly dilated. The ascending aorta is mildly dilated.    5.Patent foramen ovale present with left to right shunting indicated by color flow Doppler.    6.Moderate to severe mitral regurgitation. There is mild prolapse of the anterior mitral leaflet. There is mild prolapse of the posterior mitral leaflet.    7.When compared to the previous study dated 11/15/2016, left ventricular ejection fraction might be slightly lower, pulmonary hypertension is present, aortic and mitral insufficiency does appear worse.       Cc: Joey Garcia MD, Everton Lucero MD Amila Dilusha William, MD  7/11/2023  4:08 PM

## 2023-07-11 NOTE — PATIENT INSTRUCTIONS
[facility]  Cardiac Electrophysiology  [address]  [place] [address]   Office: [phone]  Fax: [phone]       Thank you for seeing us in clinic today - it is a pleasure to be a part of your care team.  Below is a summary of our plan from today's visit.       You have atrial fibrillation which appears to have been continuous since 2016 and had previously been accepted as permanent.  Your ventricular rates appear well controlled, though we will query this further as below.  It is unlikely that atrial fibrillation is contributing to your symptoms.  You have also been noted to have chronically reduced cardiac fucntion and moderate-severe mitral regurgitation.   We will plan for the following:  - continued acceptance of atrial fibrillation as permanent  - continue metoprolol and warfarin  - 7 day mobile cardiac telemetry monitoring to assess heart rates  - echocardiogram to evaluate cardiac function and mitral regurgitation  - follow-up visit with Dr. Garcia in general cardiology requested (6 weeks)  - continue your regular activities     Please do not hesitate to be in touch with our office at [phone] with any questions that may arise.       Thank you for trusting us with your care,    Lai Bowling MD  Clinical Cardiac Electrophysiology  [facility]  [address]  [place] [address]   Office: [phone]  Fax: [phone]

## 2023-07-26 ENCOUNTER — HOSPITAL ENCOUNTER (OUTPATIENT)
Dept: CARDIOLOGY | Facility: CLINIC | Age: 88
Discharge: HOME | End: 2023-07-26
Attending: INTERNAL MEDICINE
Payer: MEDICARE

## 2023-07-26 DIAGNOSIS — I48.21 PERMANENT ATRIAL FIBRILLATION (H): ICD-10-CM

## 2023-07-26 LAB — LVEF ECHO: NORMAL

## 2023-07-26 PROCEDURE — 93306 TTE W/DOPPLER COMPLETE: CPT | Mod: 26 | Performed by: INTERNAL MEDICINE

## 2023-07-26 PROCEDURE — 999N000096 CARDIAC MOBILE TELEMETRY MONITOR

## 2023-07-26 PROCEDURE — 93306 TTE W/DOPPLER COMPLETE: CPT

## 2023-07-28 NOTE — PROGRESS NOTES
Addendum  created 07/28/23 1150 by Adrienne Lerma MD    Charge Capture section accepted       INR 1.8 increase dose to 5 mg tue and 2.5 mg all other days. Retest in 2 weeks. After talking with pt and discussing history of greens/salads and medication change. Pt will  continue  with current diet and dosing of Warfarin.  Continue with moderation of Vit K and green leafy vegetables. Cautioned to call with increase bruising or bleeding. Reminded to call with medication change especially antibiotic. Call with any questions or concerns or any up coming procedures. Cautioned about using Herbal medication.

## 2023-08-05 ENCOUNTER — HEALTH MAINTENANCE LETTER (OUTPATIENT)
Age: 88
End: 2023-08-05

## 2023-08-07 PROCEDURE — 93228 REMOTE 30 DAY ECG REV/REPORT: CPT | Performed by: INTERNAL MEDICINE

## 2023-08-14 ENCOUNTER — LAB REQUISITION (OUTPATIENT)
Dept: LAB | Facility: CLINIC | Age: 88
End: 2023-08-14
Payer: MEDICARE

## 2023-08-14 DIAGNOSIS — I48.0 PAROXYSMAL ATRIAL FIBRILLATION (H): ICD-10-CM

## 2023-08-15 LAB — INR PPP: 2.17 (ref 0.85–1.15)

## 2023-08-15 PROCEDURE — P9603 ONE-WAY ALLOW PRORATED MILES: HCPCS | Mod: ORL

## 2023-08-15 PROCEDURE — 85610 PROTHROMBIN TIME: CPT | Mod: ORL

## 2023-08-15 PROCEDURE — 36415 COLL VENOUS BLD VENIPUNCTURE: CPT | Mod: ORL

## 2023-08-16 ENCOUNTER — TRANSFERRED RECORDS (OUTPATIENT)
Dept: HEALTH INFORMATION MANAGEMENT | Facility: CLINIC | Age: 88
End: 2023-08-16

## 2023-08-16 PROCEDURE — 81001 URINALYSIS AUTO W/SCOPE: CPT | Mod: ORL

## 2023-08-21 ENCOUNTER — LAB REQUISITION (OUTPATIENT)
Dept: LAB | Facility: CLINIC | Age: 88
End: 2023-08-21
Payer: MEDICARE

## 2023-08-21 DIAGNOSIS — R14.0 ABDOMINAL DISTENSION (GASEOUS): ICD-10-CM

## 2023-08-22 LAB
ALBUMIN SERPL BCG-MCNC: 4 G/DL (ref 3.5–5.2)
ALP SERPL-CCNC: 55 U/L (ref 40–129)
ALT SERPL W P-5'-P-CCNC: 11 U/L (ref 0–70)
ANION GAP SERPL CALCULATED.3IONS-SCNC: 12 MMOL/L (ref 7–15)
AST SERPL W P-5'-P-CCNC: 30 U/L (ref 0–45)
BASOPHILS # BLD AUTO: 0 10E3/UL (ref 0–0.2)
BASOPHILS NFR BLD AUTO: 1 %
BILIRUB SERPL-MCNC: 1 MG/DL
BUN SERPL-MCNC: 18.7 MG/DL (ref 8–23)
CALCIUM SERPL-MCNC: 10.2 MG/DL (ref 8.8–10.2)
CHLORIDE SERPL-SCNC: 109 MMOL/L (ref 98–107)
CREAT SERPL-MCNC: 0.99 MG/DL (ref 0.67–1.17)
DEPRECATED HCO3 PLAS-SCNC: 23 MMOL/L (ref 22–29)
EOSINOPHIL # BLD AUTO: 0.1 10E3/UL (ref 0–0.7)
EOSINOPHIL NFR BLD AUTO: 2 %
ERYTHROCYTE [DISTWIDTH] IN BLOOD BY AUTOMATED COUNT: 14.4 % (ref 10–15)
GFR SERPL CREATININE-BSD FRML MDRD: 74 ML/MIN/1.73M2
GLUCOSE SERPL-MCNC: 59 MG/DL (ref 70–99)
HCT VFR BLD AUTO: 45.1 % (ref 40–53)
HGB BLD-MCNC: 14.4 G/DL (ref 13.3–17.7)
IMM GRANULOCYTES # BLD: 0 10E3/UL
IMM GRANULOCYTES NFR BLD: 0 %
LYMPHOCYTES # BLD AUTO: 0.9 10E3/UL (ref 0.8–5.3)
LYMPHOCYTES NFR BLD AUTO: 15 %
MCH RBC QN AUTO: 30.7 PG (ref 26.5–33)
MCHC RBC AUTO-ENTMCNC: 31.9 G/DL (ref 31.5–36.5)
MCV RBC AUTO: 96 FL (ref 78–100)
MONOCYTES # BLD AUTO: 0.6 10E3/UL (ref 0–1.3)
MONOCYTES NFR BLD AUTO: 11 %
NEUTROPHILS # BLD AUTO: 4.3 10E3/UL (ref 1.6–8.3)
NEUTROPHILS NFR BLD AUTO: 71 %
NRBC # BLD AUTO: 0 10E3/UL
NRBC BLD AUTO-RTO: 0 /100
PLATELET # BLD AUTO: 169 10E3/UL (ref 150–450)
POTASSIUM SERPL-SCNC: 4.2 MMOL/L (ref 3.4–5.3)
PROT SERPL-MCNC: 6.5 G/DL (ref 6.4–8.3)
RBC # BLD AUTO: 4.69 10E6/UL (ref 4.4–5.9)
SODIUM SERPL-SCNC: 144 MMOL/L (ref 136–145)
WBC # BLD AUTO: 6 10E3/UL (ref 4–11)

## 2023-08-22 PROCEDURE — 80053 COMPREHEN METABOLIC PANEL: CPT | Mod: ORL

## 2023-08-22 PROCEDURE — 36415 COLL VENOUS BLD VENIPUNCTURE: CPT | Mod: ORL

## 2023-08-22 PROCEDURE — 85025 COMPLETE CBC W/AUTO DIFF WBC: CPT | Mod: ORL

## 2023-08-22 PROCEDURE — P9603 ONE-WAY ALLOW PRORATED MILES: HCPCS | Mod: ORL

## 2023-09-10 ENCOUNTER — LAB REQUISITION (OUTPATIENT)
Dept: LAB | Facility: CLINIC | Age: 88
End: 2023-09-10
Payer: MEDICARE

## 2023-09-10 DIAGNOSIS — I48.0 PAROXYSMAL ATRIAL FIBRILLATION (H): ICD-10-CM

## 2023-09-12 LAB — INR PPP: 1.95 (ref 0.85–1.15)

## 2023-09-12 PROCEDURE — 36415 COLL VENOUS BLD VENIPUNCTURE: CPT | Mod: ORL

## 2023-09-12 PROCEDURE — 85610 PROTHROMBIN TIME: CPT | Mod: ORL

## 2023-09-12 PROCEDURE — P9603 ONE-WAY ALLOW PRORATED MILES: HCPCS | Mod: ORL

## 2023-09-22 ENCOUNTER — OFFICE VISIT (OUTPATIENT)
Dept: CARDIOLOGY | Facility: CLINIC | Age: 88
End: 2023-09-22
Payer: MEDICARE

## 2023-09-22 VITALS
WEIGHT: 165 LBS | HEART RATE: 68 BPM | RESPIRATION RATE: 14 BRPM | SYSTOLIC BLOOD PRESSURE: 132 MMHG | DIASTOLIC BLOOD PRESSURE: 60 MMHG | BODY MASS INDEX: 27.49 KG/M2 | HEIGHT: 65 IN

## 2023-09-22 DIAGNOSIS — G47.33 OBSTRUCTIVE SLEEP APNEA: Primary | ICD-10-CM

## 2023-09-22 DIAGNOSIS — I34.0 MITRAL VALVE INSUFFICIENCY, UNSPECIFIED ETIOLOGY: ICD-10-CM

## 2023-09-22 DIAGNOSIS — I42.8 NONISCHEMIC CARDIOMYOPATHY (H): ICD-10-CM

## 2023-09-22 DIAGNOSIS — I50.22 CHRONIC SYSTOLIC HEART FAILURE (H): ICD-10-CM

## 2023-09-22 DIAGNOSIS — I48.21 PERMANENT ATRIAL FIBRILLATION (H): ICD-10-CM

## 2023-09-22 PROCEDURE — 99204 OFFICE O/P NEW MOD 45 MIN: CPT | Performed by: INTERNAL MEDICINE

## 2023-09-22 RX ORDER — LOSARTAN POTASSIUM 25 MG/1
25 TABLET ORAL DAILY
Qty: 90 TABLET | Refills: 3 | Status: SHIPPED | OUTPATIENT
Start: 2023-09-22

## 2023-09-22 RX ORDER — FUROSEMIDE 20 MG
20 TABLET ORAL DAILY
Qty: 90 TABLET | Refills: 3 | Status: SHIPPED | OUTPATIENT
Start: 2023-09-22

## 2023-09-22 RX ORDER — CITALOPRAM HYDROBROMIDE 10 MG/1
10 TABLET ORAL DAILY
COMMUNITY
Start: 2023-09-12

## 2023-09-22 NOTE — LETTER
9/22/2023    [facility]  [address]  [place] [address]    RE: Antonio White       Dear Colleague,     I had the pleasure of seeing Antonio White in the [facility].    CARDIOLOGY CLINIC CONSULT NOTE     Assessment/Plan:   1.  Nonischemic cardiomyopathy.  Likely related to valvular heart disease, possibly contributed to with chronic atrial fibrillation.  We will add furosemide 20 mg daily and losartan 25 mg daily, plan to check basic metabolic profile in 1 week's time at [facility].    2.  Chronic atrial fibrillation.  With trend towards slower heart rate and pauses demonstrated, we wish to avoid falls.  Would discontinue the low-dose metoprolol at this point.  3. Cheyne-Basilio breathing does not require any specific treatment     History of Present Illness:     It is my pleasure to see Antonio White at the [facility] clinic for evaluation of atrial fibrillation and cardiomyopathy.  He is accompanied today by his daughter Barbara White is a 88 year old male with a past medical history of coronary artery disease status post percutaneous intervention to the circumflex in May 2013. He also has a history of atrial fibrillation, maintained on warfarin. He has a bicuspid aortic valve with aortic root enlargement and aortic insufficiency. Moderate to severe mitral insufficiency has been noted but he has not been interested in any intervention consideration.  I Saw him last 3 years ago.  He has had significant cognitive decline in the interim.    He presents at the encouragement of staff at his care center.  They have noticed a Cheyne-Basilio pattern of breathing, which can occur both at rest and during the daytime.  This has been frightening for staff and other residents, though Antonio is unconcerned.  He denies chest pain, shortness of breath, or lightheadedness.  He has had no syncope or falls.  His activities are greatly diminished, his weight is  up 39 pounds in the 3 years since I saw him last.  He has had intermittent lower extremity edema, along with a persistent purplish discoloration of his legs.  Patient reiterates that he still has no interest in any invasive procedures, and confirms DNR/DNI status.    Echocardiogram shows worsening in the ejection fraction from 41 down to 30 to 35%.  Severe pulmonary hypertension is also demonstrated  Monitoring shows a tendency towards slower ventricular response in his chronic atrial fibrillation including multiple short pauses and a 3.6-second pause occurring during daytime hours.  Maximum heart rate 120, average 60    Past Medical History:     Patient Active Problem List   Diagnosis    Adenocarcinoma Of The Prostate Gland    Benign Essential Hypertension    Aortic Regurgitation    Permanent atrial fibrillation (H)    Mitral insufficiency    Kidney disease, chronic, stage III (GFR 30-59 ml/min) (H)    Coronary artery disease    Dyspepsia    Personal history of thromboembolic disease    Anemia    Osteoporosis    Hyperlipidemia    Obstructive sleep apnea    Pneumonia    Lower GI bleed    Hypotension, unspecified hypotension type    Acute blood loss anemia    Protein calorie malnutrition (H)    At high risk for pressure injury of skin    Pain of right heel       Past Surgical History:     Past Surgical History:   Procedure Laterality Date    COMBINED CYSTOSCOPY, INSERT STENT URETER(S) Right 10/31/2014    Procedure: CYSTOSCOPY, RIGHT URETEROSCOPY AND STENT INSERTION;  Surgeon: Chico Nur MD;  Location: [facility];  Service:     HERNIA REPAIR      left inguinal    JOINT REPLACEMENT Left     CHRIS    MOHS MICROGRAPHIC PROCEDURE  2009    OTHER SURGICAL HISTORY      melanoma surgeryRemoval of melanoma on chest.  Cleared by surgeon 1 year ago.    PARTIAL HIP ARTHROPLASTY Left 3/4/2016    Procedure: LEFT HIP FRACTURE BIPOLAR ;  Surgeon: Nichole Menon MD;  Location: [facility];  Service:     NV  ANESTH,REPAIR UPPER ABD HERNIA NOS Bilateral     ingiunal     FL CYSTO/URETERO W/LITHOTRIPSY &INDWELL STENT INSRT Bilateral 2/12/2018    Procedure: CYSTOSCOPY, BILATERAL URETEROSCOPY, LASER LITHOTRIPSY STONE EXTRACTION, URETERAL STENT PLACEMENT;  Surgeon: Lincoln Srinivasan MD;  Location: [facility];  Service: Urology    FL CYSTO/URETERO W/LITHOTRIPSY &INDWELL STENT INSRT Bilateral 3/5/2018    Procedure: CYSTOSCOPY, BILATERAL URETEROSCOPY, LASER LITHOTRIPSY, BASKET EXTRACTION, BILATERAL URETERAL STENT EXCHANGE;  Surgeon: Lincoln Srinivasan MD;  Location: [facility];  Service: Urology    FL CYSTOURETHROSCOPY,URETER CATHETER Bilateral 2/12/2018    Procedure: CYSTOSCOPY, WITH RETROGRADE PYELOGRAM;  Surgeon: Lincoln Srinivasan MD;  Location: [facility];  Service: Urology    TONSILLECTOMY      at age 7    TONSILLECTOMY, ADENOIDECTOMY, MYRINGOTOMY, INSERT TUBE BILATERAL, COMBINED      ZZHC COLONOSCOPY W/WO BRUSH/WASH N/A 8/14/2020    Procedure: COLONOSCOPY;  Surgeon: Giancarlo Cardona MD;  Location: [facility];  Service: Gastroenterology       Family History:     Family History   Problem Relation Age of Onset    Heart Disease Mother         hypertension    Hypertension Mother     Prostate Cancer Father     Cancer Father         mouth and throat    Cancer Sister         kidney    Cancer Daughter         skin    Cancer Daughter         skin     Family history reviewed and is not pertinent to the chief complaint or presenting problem    Social History:    reports that he has never smoked. He has never used smokeless tobacco. He reports current alcohol use of about 4.0 standard drinks of alcohol per week. He reports that he does not use drugs.    Exercise: Limited.  Dyspneic with transfers from bed to chair.    Sleep: Sleeps much of the day, intermittent confusion.  No oxygen supplement.    Meds:     Current Outpatient Medications   Medication Sig Dispense Refill    acetaminophen (TYLENOL) 500 MG  "tablet Take 1,000 mg by mouth 3 times daily      amoxicillin (AMOXIL) 500 MG capsule Take 4 capsules by mouth Prior to dental procedure      Ascorbic Acid (VITAMIN C) 500 MG CAPS Take 500 mg by mouth daily      cholestyramine (QUESTRAN) 4 g packet Cholestyramine Oral    QD    active      citalopram (CELEXA) 10 MG tablet Take 10 mg by mouth daily      Melatonin 10 MG CAPS 1 capsule      metoprolol tartrate (LOPRESSOR) 25 MG tablet Take 12.5 mg by mouth daily      Multiple Vitamins-Minerals (ONCOVITE) TABS Take 1 tablet by mouth daily      nystatin (MYCOSTATIN) 273287 UNIT/GM external powder Nystatin Topical Powder    BID PRN for rash   active      ondansetron (ZOFRAN) 4 MG tablet Take 4 mg by mouth as needed for nausea or vomiting      polyethylene glycol (MIRALAX) 17 g packet Take 1 packet by mouth as needed for constipation      pravastatin (PRAVACHOL) 80 MG tablet Take 80 mg by mouth At Bedtime      vitamin D3 (CHOLECALCIFEROL) 50 mcg (2000 units) tablet Take 1 tablet by mouth daily      warfarin ANTICOAGULANT (COUMADIN) 1 MG tablet Take 1 mg by mouth Wednesday      warfarin ANTICOAGULANT (COUMADIN) 2.5 MG tablet Take 2.5 mg by mouth Sunday, Monday, Tuesday, Thursday, Friday, Saturday         Allergies:   Levofloxacin    Review of Systems:     Positive for occasional nosebleeds, shortness of breath, dyspnea with activities such as transfers, irregular heartbeat, occasional leg swelling, occasional palpitations at rest, constipation, diarrhea, muscle weakness, nocturia, confusion, anxiety.  12 point review of systems otherwise negative     Please refer above for cardiac ROS details.       Objective:      Physical Exam  74.8 kg (165 lb)  1.651 m (5' 5\")  Body mass index is 27.46 kg/m .  /60 (BP Location: Left arm, Patient Position: Sitting, Cuff Size: Adult Large)   Pulse 68   Resp 14   Ht 1.651 m (5' 5\")   Wt 74.8 kg (165 lb)   BMI 27.46 kg/m          General Appearance : Awake, Alert, No acute " distress  HEENT: No Scleral icterus; the mucous membranes were pink and moist.  Conjunctivae not injected  Neck:  No cervical bruits, jugular venous distention, or thyromegaly   Chest: The spine was straight. Chest wall symmetric  Lungs: Respirations unlabored; the lungs are clear to auscultation.  No wheezing   Cardiovascular: Laterally displaced point of maximal impulse.  Auscultation reveals irregular, slow first and second heart sounds with 2/6 apical systolic murmur radiating to the axilla.  No diastolic murmur audible carotid, radial, and dorsalis pedal pulses are intact and symmetric.    Abdomen: Protuberant.  No fluid wave.  No organomegaly appreciated, no bruits, or tenderness. Bowels sounds are present  Extremities: Ruborous, trace pretibial and pedal edema  Skin: No xanthelasma. Warm, Dry.  Musculoskeletal: No tenderness.  Neurologic: Alert and oriented ×3. Speech is fluent.  Intermittently nonsensical speech and confusion.  Pleasant      EKG (personally reviewed):  2/20/2021: Atrial fibrillation at 87 bpm.  Right bundle branch block.  Nonspecific T wave changes.    Cardiac Imaging Studies:  Mobile cardiac telemetry monitoring from 7/26/2023 to 8/1/2023 (monitored duration 4d 6h 12m).  Apparently continuous atrial fibrillation and atrial flutter (monitor reported 19% AF burden - no recordings show sinus rhythm), ventricular rates 23 (7/29/2023 09:54) to 120, average 63bpm.  1 episode of nonsustained ventricular tachycardia vs aberrancy, 4 beats, 103bpm.  11 pauses of over 2.0s - longest 3.6s (7/27/2023 09:55).  Frequent premature ventricular contractions (11%).  No symptoms recorded.      Echocardiogram 7/2023:  The left ventricle is mildly dilated.  There is mild concentric left ventricular hypertrophy.  The visual ejection fraction is 30-35%.  There is inferior wall akinesis.  There is moderate global hypokinesia of the left ventricle.  Mildly decreased right ventricular systolic function  Moderate  valvular aortic stenosis.  Estimated DIEGO 2.0 cm   There is mild to moderate (1-2+) aortic regurgitation.  The right ventricular systolic pressure is approximated at 63 mmHg.  There is no comparison study available.    Lab Review   Lab Results   Component Value Date     08/22/2023     02/20/2021    CO2 23 08/22/2023    CO2 20 02/01/2022    CO2 21 02/20/2021    BUN 18.7 08/22/2023    BUN 19 02/01/2022    BUN 21 02/20/2021     Lab Results   Component Value Date    WBC 6.0 08/22/2023    WBC 10.8 02/20/2021    HGB 14.4 08/22/2023    HGB 13.3 02/20/2021    HCT 45.1 08/22/2023    HCT 41.7 02/20/2021    MCV 96 08/22/2023    MCV 93 02/20/2021     08/22/2023     02/20/2021     Lab Results   Component Value Date    CHOL 113 08/09/2022    TRIG 170 08/09/2022    HDL 39 08/09/2022     Lab Results   Component Value Date    TROPONINI 0.02 08/12/2020     No results found for: BNP  Lab Results   Component Value Date    TSH 1.75 05/16/2023    TSH 1.28 02/22/2021       Joey Garcia MD, MD [facility]      This note created using Dragon voice recognition software. Sound alike errors may have escaped editing.        Thank you for allowing me to participate in the care of your patient.      Sincerely,     Joey Garcia MD     [facility]  cc:   Lai Bowling MD  [address]  [place] [address]

## 2023-09-22 NOTE — PROGRESS NOTES
CARDIOLOGY CLINIC CONSULT NOTE     Assessment/Plan:   1.  Nonischemic cardiomyopathy.  Likely related to valvular heart disease, possibly contributed to with chronic atrial fibrillation.  We will add furosemide 20 mg daily and losartan 25 mg daily, plan to check basic metabolic profile in 1 week's time at [facility].    2.  Chronic atrial fibrillation.  With trend towards slower heart rate and pauses demonstrated, we wish to avoid falls.  Would discontinue the low-dose metoprolol at this point.  3. Cheyne-Basilio breathing does not require any specific treatment     History of Present Illness:     It is my pleasure to see Antonio White at the [facility] clinic for evaluation of atrial fibrillation and cardiomyopathy.  He is accompanied today by his daughter Barbara White is a 88 year old male with a past medical history of coronary artery disease status post percutaneous intervention to the circumflex in May 2013. He also has a history of atrial fibrillation, maintained on warfarin. He has a bicuspid aortic valve with aortic root enlargement and aortic insufficiency. Moderate to severe mitral insufficiency has been noted but he has not been interested in any intervention consideration.  I Saw him last 3 years ago.  He has had significant cognitive decline in the interim.    He presents at the encouragement of staff at his care center.  They have noticed a Cheyne-Basilio pattern of breathing, which can occur both at rest and during the daytime.  This has been frightening for staff and other residents, though Antonio is unconcerned.  He denies chest pain, shortness of breath, or lightheadedness.  He has had no syncope or falls.  His activities are greatly diminished, his weight is up 39 pounds in the 3 years since I saw him last.  He has had intermittent lower extremity edema, along with a persistent purplish discoloration of his legs.  Patient reiterates that he still has no interest in any  invasive procedures, and confirms DNR/DNI status.    Echocardiogram shows worsening in the ejection fraction from 41 down to 30 to 35%.  Severe pulmonary hypertension is also demonstrated  Monitoring shows a tendency towards slower ventricular response in his chronic atrial fibrillation including multiple short pauses and a 3.6-second pause occurring during daytime hours.  Maximum heart rate 120, average 60    Past Medical History:     Patient Active Problem List   Diagnosis    Adenocarcinoma Of The Prostate Gland    Benign Essential Hypertension    Aortic Regurgitation    Permanent atrial fibrillation (H)    Mitral insufficiency    Kidney disease, chronic, stage III (GFR 30-59 ml/min) (H)    Coronary artery disease    Dyspepsia    Personal history of thromboembolic disease    Anemia    Osteoporosis    Hyperlipidemia    Obstructive sleep apnea    Pneumonia    Lower GI bleed    Hypotension, unspecified hypotension type    Acute blood loss anemia    Protein calorie malnutrition (H)    At high risk for pressure injury of skin    Pain of right heel       Past Surgical History:     Past Surgical History:   Procedure Laterality Date    COMBINED CYSTOSCOPY, INSERT STENT URETER(S) Right 10/31/2014    Procedure: CYSTOSCOPY, RIGHT URETEROSCOPY AND STENT INSERTION;  Surgeon: Chico Nur MD;  Location: [facility];  Service:     HERNIA REPAIR      left inguinal    JOINT REPLACEMENT Left     CHRIS    MOHS MICROGRAPHIC PROCEDURE  2009    OTHER SURGICAL HISTORY      melanoma surgeryRemoval of melanoma on chest.  Cleared by surgeon 1 year ago.    PARTIAL HIP ARTHROPLASTY Left 3/4/2016    Procedure: LEFT HIP FRACTURE BIPOLAR ;  Surgeon: Nichole Menon MD;  Location: [facility];  Service:     UT ANESTH,REPAIR UPPER ABD HERNIA NOS Bilateral     ingiunal     UT CYSTO/URETERO W/LITHOTRIPSY &INDWELL STENT INSRT Bilateral 2/12/2018    Procedure: CYSTOSCOPY, BILATERAL URETEROSCOPY, LASER LITHOTRIPSY STONE EXTRACTION,  URETERAL STENT PLACEMENT;  Surgeon: Lincoln Srinivasan MD;  Location: [facility];  Service: Urology    OH CYSTO/URETERO W/LITHOTRIPSY &INDWELL STENT INSRT Bilateral 3/5/2018    Procedure: CYSTOSCOPY, BILATERAL URETEROSCOPY, LASER LITHOTRIPSY, BASKET EXTRACTION, BILATERAL URETERAL STENT EXCHANGE;  Surgeon: Lincoln Srinivasan MD;  Location: [facility] OR;  Service: Urology    OH CYSTOURETHROSCOPY,URETER CATHETER Bilateral 2/12/2018    Procedure: CYSTOSCOPY, WITH RETROGRADE PYELOGRAM;  Surgeon: Lincoln Srinivasan MD;  Location: [facility] OR;  Service: Urology    TONSILLECTOMY      at age 7    TONSILLECTOMY, ADENOIDECTOMY, MYRINGOTOMY, INSERT TUBE BILATERAL, COMBINED      ZZHC COLONOSCOPY W/WO BRUSH/WASH N/A 8/14/2020    Procedure: COLONOSCOPY;  Surgeon: Giancarlo Cardona MD;  Location: [facility];  Service: Gastroenterology       Family History:     Family History   Problem Relation Age of Onset    Heart Disease Mother         hypertension    Hypertension Mother     Prostate Cancer Father     Cancer Father         mouth and throat    Cancer Sister         kidney    Cancer Daughter         skin    Cancer Daughter         skin     Family history reviewed and is not pertinent to the chief complaint or presenting problem    Social History:    reports that he has never smoked. He has never used smokeless tobacco. He reports current alcohol use of about 4.0 standard drinks of alcohol per week. He reports that he does not use drugs.    Exercise: Limited.  Dyspneic with transfers from bed to chair.    Sleep: Sleeps much of the day, intermittent confusion.  No oxygen supplement.    Meds:     Current Outpatient Medications   Medication Sig Dispense Refill    acetaminophen (TYLENOL) 500 MG tablet Take 1,000 mg by mouth 3 times daily      amoxicillin (AMOXIL) 500 MG capsule Take 4 capsules by mouth Prior to dental procedure      Ascorbic Acid (VITAMIN C) 500 MG CAPS Take 500 mg by mouth daily       "cholestyramine (QUESTRAN) 4 g packet Cholestyramine Oral    QD    active      citalopram (CELEXA) 10 MG tablet Take 10 mg by mouth daily      Melatonin 10 MG CAPS 1 capsule      metoprolol tartrate (LOPRESSOR) 25 MG tablet Take 12.5 mg by mouth daily      Multiple Vitamins-Minerals (ONCOVITE) TABS Take 1 tablet by mouth daily      nystatin (MYCOSTATIN) 939998 UNIT/GM external powder Nystatin Topical Powder    BID PRN for rash   active      ondansetron (ZOFRAN) 4 MG tablet Take 4 mg by mouth as needed for nausea or vomiting      polyethylene glycol (MIRALAX) 17 g packet Take 1 packet by mouth as needed for constipation      pravastatin (PRAVACHOL) 80 MG tablet Take 80 mg by mouth At Bedtime      vitamin D3 (CHOLECALCIFEROL) 50 mcg (2000 units) tablet Take 1 tablet by mouth daily      warfarin ANTICOAGULANT (COUMADIN) 1 MG tablet Take 1 mg by mouth Wednesday      warfarin ANTICOAGULANT (COUMADIN) 2.5 MG tablet Take 2.5 mg by mouth Sunday, Monday, Tuesday, Thursday, Friday, Saturday         Allergies:   Levofloxacin    Review of Systems:     Positive for occasional nosebleeds, shortness of breath, dyspnea with activities such as transfers, irregular heartbeat, occasional leg swelling, occasional palpitations at rest, constipation, diarrhea, muscle weakness, nocturia, confusion, anxiety.  12 point review of systems otherwise negative     Please refer above for cardiac ROS details.       Objective:      Physical Exam  74.8 kg (165 lb)  1.651 m (5' 5\")  Body mass index is 27.46 kg/m .  /60 (BP Location: Left arm, Patient Position: Sitting, Cuff Size: Adult Large)   Pulse 68   Resp 14   Ht 1.651 m (5' 5\")   Wt 74.8 kg (165 lb)   BMI 27.46 kg/m          General Appearance : Awake, Alert, No acute distress  HEENT: No Scleral icterus; the mucous membranes were pink and moist.  Conjunctivae not injected  Neck:  No cervical bruits, jugular venous distention, or thyromegaly   Chest: Moderate kyphosis   Lungs: " Respirations unlabored; the lungs are clear to auscultation.  No wheezing   Cardiovascular: Laterally displaced point of maximal impulse.  Auscultation reveals irregular, slow first and second heart sounds with 2/6 apical systolic murmur radiating to the axilla.  No diastolic murmur audible carotid, radial, and dorsalis pedal pulses are intact and symmetric.    Abdomen: Protuberant.  No fluid wave.  No organomegaly appreciated, no bruits, or tenderness. Bowels sounds are present  Extremities: Ruborous, trace pretibial and pedal edema  Skin: No xanthelasma. Warm, Dry.  Musculoskeletal: No tenderness.  Neurologic: Alert and oriented ×3. Speech is fluent.  Intermittently nonsensical speech and confusion.  Pleasant      EKG (personally reviewed):  2/20/2021: Atrial fibrillation at 87 bpm.  Right bundle branch block.  Nonspecific T wave changes.    Cardiac Imaging Studies:  Mobile cardiac telemetry monitoring from 7/26/2023 to 8/1/2023 (monitored duration 4d 6h 12m).  Apparently continuous atrial fibrillation and atrial flutter (monitor reported 19% AF burden - no recordings show sinus rhythm), ventricular rates 23 (7/29/2023 09:54) to 120, average 63bpm.  1 episode of nonsustained ventricular tachycardia vs aberrancy, 4 beats, 103bpm.  11 pauses of over 2.0s - longest 3.6s (7/27/2023 09:55).  Frequent premature ventricular contractions (11%).  No symptoms recorded.      Echocardiogram 7/2023:  The left ventricle is mildly dilated.  There is mild concentric left ventricular hypertrophy.  The visual ejection fraction is 30-35%.  There is inferior wall akinesis.  There is moderate global hypokinesia of the left ventricle.  Mildly decreased right ventricular systolic function  Moderate valvular aortic stenosis.  Estimated DIEGO 2.0 cm   There is mild to moderate (1-2+) aortic regurgitation.  The right ventricular systolic pressure is approximated at 63 mmHg.  There is no comparison study available.    Lab Review   Lab Results    Component Value Date     08/22/2023     02/20/2021    CO2 23 08/22/2023    CO2 20 02/01/2022    CO2 21 02/20/2021    BUN 18.7 08/22/2023    BUN 19 02/01/2022    BUN 21 02/20/2021     Lab Results   Component Value Date    WBC 6.0 08/22/2023    WBC 10.8 02/20/2021    HGB 14.4 08/22/2023    HGB 13.3 02/20/2021    HCT 45.1 08/22/2023    HCT 41.7 02/20/2021    MCV 96 08/22/2023    MCV 93 02/20/2021     08/22/2023     02/20/2021     Lab Results   Component Value Date    CHOL 113 08/09/2022    TRIG 170 08/09/2022    HDL 39 08/09/2022     Lab Results   Component Value Date    TROPONINI 0.02 08/12/2020     No results found for: BNP  Lab Results   Component Value Date    TSH 1.75 05/16/2023    TSH 1.28 02/22/2021       Joey Garcia MD, MD [facility]      This note created using Dragon voice recognition software. Sound alike errors may have escaped editing.

## 2023-09-22 NOTE — PATIENT INSTRUCTIONS
Discontinue metoprolol.  We do not want your heart going so slow that you could pass out or fall.  Start losartan 25 mg daily to help with blood pressure control  Start furosemide 20 mg daily to help prevent fluid overload.  We will try to check a overnight oxygen level study to see if you might benefit from supplemental oxygen at night.  Your Cheyne-Basilio breathing pattern is likely stable for you, and does not by itself require any additional treatment.  We will check a basic metabolic profile in 1 week's time on your medications.  This could be done at your care center.

## 2023-09-24 ENCOUNTER — LAB REQUISITION (OUTPATIENT)
Dept: LAB | Facility: CLINIC | Age: 88
End: 2023-09-24
Payer: MEDICARE

## 2023-09-24 DIAGNOSIS — I48.0 PAROXYSMAL ATRIAL FIBRILLATION (H): ICD-10-CM

## 2023-09-26 LAB — INR PPP: 2.32 (ref 0.85–1.15)

## 2023-09-26 PROCEDURE — 36415 COLL VENOUS BLD VENIPUNCTURE: CPT | Mod: ORL

## 2023-09-26 PROCEDURE — P9604 ONE-WAY ALLOW PRORATED TRIP: HCPCS | Mod: ORL

## 2023-09-26 PROCEDURE — 85610 PROTHROMBIN TIME: CPT | Mod: ORL

## 2023-10-02 ENCOUNTER — LAB REQUISITION (OUTPATIENT)
Dept: LAB | Facility: CLINIC | Age: 88
End: 2023-10-02
Payer: MEDICARE

## 2023-10-02 DIAGNOSIS — I48.0 PAROXYSMAL ATRIAL FIBRILLATION (H): ICD-10-CM

## 2023-10-03 LAB
ANION GAP SERPL CALCULATED.3IONS-SCNC: 12 MMOL/L (ref 7–15)
BUN SERPL-MCNC: 21.2 MG/DL (ref 8–23)
CALCIUM SERPL-MCNC: 10.4 MG/DL (ref 8.8–10.2)
CHLORIDE SERPL-SCNC: 109 MMOL/L (ref 98–107)
CREAT SERPL-MCNC: 0.82 MG/DL (ref 0.67–1.17)
DEPRECATED HCO3 PLAS-SCNC: 21 MMOL/L (ref 22–29)
EGFRCR SERPLBLD CKD-EPI 2021: 86 ML/MIN/1.73M2
GLUCOSE SERPL-MCNC: 72 MG/DL (ref 70–99)
HOLD SPECIMEN: NORMAL
POTASSIUM SERPL-SCNC: 4.2 MMOL/L (ref 3.4–5.3)
SODIUM SERPL-SCNC: 142 MMOL/L (ref 135–145)

## 2023-10-03 PROCEDURE — P9603 ONE-WAY ALLOW PRORATED MILES: HCPCS | Mod: ORL

## 2023-10-03 PROCEDURE — 36415 COLL VENOUS BLD VENIPUNCTURE: CPT | Mod: ORL

## 2023-10-03 PROCEDURE — 80048 BASIC METABOLIC PNL TOTAL CA: CPT | Mod: ORL

## 2023-10-08 ENCOUNTER — LAB REQUISITION (OUTPATIENT)
Dept: LAB | Facility: CLINIC | Age: 88
End: 2023-10-08
Payer: MEDICARE

## 2023-10-08 DIAGNOSIS — I48.0 PAROXYSMAL ATRIAL FIBRILLATION (H): ICD-10-CM

## 2023-10-10 LAB — INR PPP: 1.71 (ref 0.85–1.15)

## 2023-10-10 PROCEDURE — 85610 PROTHROMBIN TIME: CPT | Mod: ORL

## 2023-10-10 PROCEDURE — 36415 COLL VENOUS BLD VENIPUNCTURE: CPT | Mod: ORL

## 2023-10-10 PROCEDURE — P9603 ONE-WAY ALLOW PRORATED MILES: HCPCS | Mod: ORL

## 2023-10-21 ENCOUNTER — LAB REQUISITION (OUTPATIENT)
Dept: LAB | Facility: CLINIC | Age: 88
End: 2023-10-21
Payer: MEDICARE

## 2023-10-21 DIAGNOSIS — I48.0 PAROXYSMAL ATRIAL FIBRILLATION (H): ICD-10-CM

## 2023-10-24 LAB — INR PPP: 2.38 (ref 0.85–1.15)

## 2023-10-24 PROCEDURE — 36415 COLL VENOUS BLD VENIPUNCTURE: CPT | Mod: ORL

## 2023-10-24 PROCEDURE — 85610 PROTHROMBIN TIME: CPT | Mod: ORL

## 2023-10-24 PROCEDURE — P9604 ONE-WAY ALLOW PRORATED TRIP: HCPCS | Mod: ORL

## 2023-11-05 ENCOUNTER — LAB REQUISITION (OUTPATIENT)
Dept: LAB | Facility: CLINIC | Age: 88
End: 2023-11-05
Payer: MEDICARE

## 2023-11-05 DIAGNOSIS — I48.0 PAROXYSMAL ATRIAL FIBRILLATION (H): ICD-10-CM

## 2023-11-07 LAB — INR PPP: 2.43 (ref 0.85–1.15)

## 2023-11-07 PROCEDURE — 85610 PROTHROMBIN TIME: CPT | Mod: ORL

## 2023-11-07 PROCEDURE — 36415 COLL VENOUS BLD VENIPUNCTURE: CPT | Mod: ORL

## 2023-11-07 PROCEDURE — P9603 ONE-WAY ALLOW PRORATED MILES: HCPCS | Mod: ORL

## 2023-11-25 ENCOUNTER — LAB REQUISITION (OUTPATIENT)
Dept: LAB | Facility: CLINIC | Age: 88
End: 2023-11-25
Payer: MEDICARE

## 2023-11-25 DIAGNOSIS — I48.0 PAROXYSMAL ATRIAL FIBRILLATION (H): ICD-10-CM

## 2023-11-28 LAB — INR PPP: 2.16 (ref 0.85–1.15)

## 2023-11-28 PROCEDURE — P9603 ONE-WAY ALLOW PRORATED MILES: HCPCS | Mod: ORL

## 2023-11-28 PROCEDURE — 36415 COLL VENOUS BLD VENIPUNCTURE: CPT | Mod: ORL

## 2023-11-28 PROCEDURE — 85610 PROTHROMBIN TIME: CPT | Mod: ORL

## 2023-12-18 ENCOUNTER — LAB REQUISITION (OUTPATIENT)
Dept: LAB | Facility: CLINIC | Age: 88
End: 2023-12-18
Payer: MEDICARE

## 2023-12-18 DIAGNOSIS — I48.0 PAROXYSMAL ATRIAL FIBRILLATION (H): ICD-10-CM

## 2023-12-19 LAB — INR PPP: 1.9 (ref 0.85–1.15)

## 2023-12-19 PROCEDURE — 85610 PROTHROMBIN TIME: CPT | Mod: ORL

## 2023-12-19 PROCEDURE — P9603 ONE-WAY ALLOW PRORATED MILES: HCPCS | Mod: ORL

## 2023-12-19 PROCEDURE — 36415 COLL VENOUS BLD VENIPUNCTURE: CPT | Mod: ORL

## 2023-12-30 ENCOUNTER — LAB REQUISITION (OUTPATIENT)
Dept: LAB | Facility: CLINIC | Age: 88
End: 2023-12-30
Payer: MEDICARE

## 2023-12-30 DIAGNOSIS — I48.0 PAROXYSMAL ATRIAL FIBRILLATION (H): ICD-10-CM

## 2024-01-02 LAB — INR PPP: 1.27 (ref 0.85–1.15)

## 2024-01-02 PROCEDURE — 36415 COLL VENOUS BLD VENIPUNCTURE: CPT | Mod: ORL

## 2024-01-02 PROCEDURE — P9603 ONE-WAY ALLOW PRORATED MILES: HCPCS | Mod: ORL

## 2024-01-02 PROCEDURE — 85610 PROTHROMBIN TIME: CPT | Mod: ORL

## 2024-01-15 ENCOUNTER — LAB REQUISITION (OUTPATIENT)
Dept: LAB | Facility: CLINIC | Age: 89
End: 2024-01-15
Payer: MEDICARE

## 2024-01-15 DIAGNOSIS — I48.0 PAROXYSMAL ATRIAL FIBRILLATION (H): ICD-10-CM

## 2024-01-16 LAB — INR PPP: 1.32 (ref 0.85–1.15)

## 2024-01-16 PROCEDURE — 36415 COLL VENOUS BLD VENIPUNCTURE: CPT | Mod: ORL

## 2024-01-16 PROCEDURE — P9603 ONE-WAY ALLOW PRORATED MILES: HCPCS | Mod: ORL

## 2024-01-16 PROCEDURE — 85610 PROTHROMBIN TIME: CPT | Mod: ORL

## 2024-01-22 ENCOUNTER — LAB REQUISITION (OUTPATIENT)
Dept: LAB | Facility: CLINIC | Age: 89
End: 2024-01-22
Payer: MEDICARE

## 2024-01-22 DIAGNOSIS — I48.0 PAROXYSMAL ATRIAL FIBRILLATION (H): ICD-10-CM

## 2024-01-23 LAB — INR PPP: 1.17 (ref 0.85–1.15)

## 2024-01-23 PROCEDURE — 36415 COLL VENOUS BLD VENIPUNCTURE: CPT | Mod: ORL

## 2024-01-23 PROCEDURE — P9603 ONE-WAY ALLOW PRORATED MILES: HCPCS | Mod: ORL

## 2024-01-23 PROCEDURE — 85610 PROTHROMBIN TIME: CPT | Mod: ORL

## 2024-01-28 ENCOUNTER — LAB REQUISITION (OUTPATIENT)
Dept: LAB | Facility: CLINIC | Age: 89
End: 2024-01-28
Payer: MEDICARE

## 2024-01-28 DIAGNOSIS — I48.0 PAROXYSMAL ATRIAL FIBRILLATION (H): ICD-10-CM

## 2024-01-30 LAB — INR PPP: 1.56 (ref 0.85–1.15)

## 2024-01-30 PROCEDURE — P9603 ONE-WAY ALLOW PRORATED MILES: HCPCS | Mod: ORL

## 2024-01-30 PROCEDURE — 36415 COLL VENOUS BLD VENIPUNCTURE: CPT | Mod: ORL

## 2024-01-30 PROCEDURE — 85610 PROTHROMBIN TIME: CPT | Mod: ORL

## 2024-02-11 ENCOUNTER — LAB REQUISITION (OUTPATIENT)
Dept: LAB | Facility: CLINIC | Age: 89
End: 2024-02-11
Payer: MEDICARE

## 2024-02-11 DIAGNOSIS — I48.0 PAROXYSMAL ATRIAL FIBRILLATION (H): ICD-10-CM

## 2024-02-13 LAB — INR PPP: 1.7 (ref 0.85–1.15)

## 2024-02-13 PROCEDURE — P9604 ONE-WAY ALLOW PRORATED TRIP: HCPCS | Mod: ORL

## 2024-02-13 PROCEDURE — 36415 COLL VENOUS BLD VENIPUNCTURE: CPT | Mod: ORL

## 2024-02-13 PROCEDURE — 85610 PROTHROMBIN TIME: CPT | Mod: ORL

## 2024-02-26 ENCOUNTER — LAB REQUISITION (OUTPATIENT)
Dept: LAB | Facility: CLINIC | Age: 89
End: 2024-02-26
Payer: MEDICARE

## 2024-02-26 DIAGNOSIS — I48.0 PAROXYSMAL ATRIAL FIBRILLATION (H): ICD-10-CM

## 2024-02-27 LAB — INR PPP: 1.63 (ref 0.85–1.15)

## 2024-02-27 PROCEDURE — P9604 ONE-WAY ALLOW PRORATED TRIP: HCPCS | Mod: ORL

## 2024-02-27 PROCEDURE — 85610 PROTHROMBIN TIME: CPT | Mod: ORL

## 2024-02-27 PROCEDURE — 36415 COLL VENOUS BLD VENIPUNCTURE: CPT | Mod: ORL

## 2024-03-11 ENCOUNTER — LAB REQUISITION (OUTPATIENT)
Dept: LAB | Facility: CLINIC | Age: 89
End: 2024-03-11
Payer: MEDICARE

## 2024-03-11 DIAGNOSIS — I48.0 PAROXYSMAL ATRIAL FIBRILLATION (H): ICD-10-CM

## 2024-03-12 LAB — INR PPP: 2.01 (ref 0.85–1.15)

## 2024-03-12 PROCEDURE — 85610 PROTHROMBIN TIME: CPT | Mod: ORL

## 2024-03-12 PROCEDURE — P9603 ONE-WAY ALLOW PRORATED MILES: HCPCS | Mod: ORL

## 2024-03-12 PROCEDURE — 36415 COLL VENOUS BLD VENIPUNCTURE: CPT | Mod: ORL

## 2024-03-25 ENCOUNTER — LAB REQUISITION (OUTPATIENT)
Dept: LAB | Facility: CLINIC | Age: 89
End: 2024-03-25
Payer: MEDICARE

## 2024-03-25 DIAGNOSIS — I48.91 UNSPECIFIED ATRIAL FIBRILLATION (H): ICD-10-CM

## 2024-03-26 LAB — INR PPP: 1.64 (ref 0.85–1.15)

## 2024-03-26 PROCEDURE — 85610 PROTHROMBIN TIME: CPT | Mod: ORL

## 2024-03-26 PROCEDURE — 36415 COLL VENOUS BLD VENIPUNCTURE: CPT | Mod: ORL

## 2024-03-26 PROCEDURE — P9604 ONE-WAY ALLOW PRORATED TRIP: HCPCS | Mod: ORL

## 2024-04-01 ENCOUNTER — LAB REQUISITION (OUTPATIENT)
Dept: LAB | Facility: CLINIC | Age: 89
End: 2024-04-01
Payer: MEDICARE

## 2024-04-01 DIAGNOSIS — I48.0 PAROXYSMAL ATRIAL FIBRILLATION (H): ICD-10-CM

## 2024-04-02 LAB — INR PPP: 1.88 (ref 0.85–1.15)

## 2024-04-02 PROCEDURE — 36415 COLL VENOUS BLD VENIPUNCTURE: CPT | Mod: ORL

## 2024-04-02 PROCEDURE — P9604 ONE-WAY ALLOW PRORATED TRIP: HCPCS | Mod: ORL

## 2024-04-02 PROCEDURE — 85610 PROTHROMBIN TIME: CPT | Mod: ORL

## 2024-04-15 ENCOUNTER — LAB REQUISITION (OUTPATIENT)
Dept: LAB | Facility: CLINIC | Age: 89
End: 2024-04-15
Payer: MEDICARE

## 2024-04-15 DIAGNOSIS — I48.91 UNSPECIFIED ATRIAL FIBRILLATION (H): ICD-10-CM

## 2024-04-16 LAB
HOLD SPECIMEN: NORMAL
HOLD SPECIMEN: NORMAL
INR PPP: 2.94 (ref 0.85–1.15)

## 2024-04-16 PROCEDURE — 36415 COLL VENOUS BLD VENIPUNCTURE: CPT | Mod: ORL

## 2024-04-16 PROCEDURE — 85610 PROTHROMBIN TIME: CPT | Mod: ORL

## 2024-04-16 PROCEDURE — P9603 ONE-WAY ALLOW PRORATED MILES: HCPCS | Mod: ORL

## 2024-04-27 ENCOUNTER — LAB REQUISITION (OUTPATIENT)
Dept: LAB | Facility: CLINIC | Age: 89
End: 2024-04-27
Payer: MEDICARE

## 2024-04-27 DIAGNOSIS — I48.0 PAROXYSMAL ATRIAL FIBRILLATION (H): ICD-10-CM

## 2024-04-30 LAB — INR PPP: 3.22 (ref 0.85–1.15)

## 2024-04-30 PROCEDURE — 85610 PROTHROMBIN TIME: CPT | Mod: ORL

## 2024-04-30 PROCEDURE — P9603 ONE-WAY ALLOW PRORATED MILES: HCPCS | Mod: ORL

## 2024-04-30 PROCEDURE — 36415 COLL VENOUS BLD VENIPUNCTURE: CPT | Mod: ORL

## 2024-05-05 ENCOUNTER — LAB REQUISITION (OUTPATIENT)
Dept: LAB | Facility: CLINIC | Age: 89
End: 2024-05-05
Payer: MEDICARE

## 2024-05-05 DIAGNOSIS — I48.91 UNSPECIFIED ATRIAL FIBRILLATION (H): ICD-10-CM

## 2024-05-07 LAB — INR PPP: 4.86 (ref 0.85–1.15)

## 2024-05-07 PROCEDURE — P9603 ONE-WAY ALLOW PRORATED MILES: HCPCS | Mod: ORL

## 2024-05-07 PROCEDURE — 85610 PROTHROMBIN TIME: CPT | Mod: ORL

## 2024-05-07 PROCEDURE — 36415 COLL VENOUS BLD VENIPUNCTURE: CPT | Mod: ORL

## 2024-05-12 ENCOUNTER — LAB REQUISITION (OUTPATIENT)
Dept: LAB | Facility: CLINIC | Age: 89
End: 2024-05-12
Payer: MEDICARE

## 2024-05-12 DIAGNOSIS — I48.0 PAROXYSMAL ATRIAL FIBRILLATION (H): ICD-10-CM

## 2024-05-14 LAB — INR PPP: 2.48 (ref 0.85–1.15)

## 2024-05-14 PROCEDURE — 36415 COLL VENOUS BLD VENIPUNCTURE: CPT | Mod: ORL

## 2024-05-14 PROCEDURE — P9603 ONE-WAY ALLOW PRORATED MILES: HCPCS | Mod: ORL

## 2024-05-14 PROCEDURE — 85610 PROTHROMBIN TIME: CPT | Mod: ORL

## 2024-05-20 ENCOUNTER — LAB REQUISITION (OUTPATIENT)
Dept: LAB | Facility: CLINIC | Age: 89
End: 2024-05-20
Payer: MEDICARE

## 2024-05-20 DIAGNOSIS — I48.0 PAROXYSMAL ATRIAL FIBRILLATION (H): ICD-10-CM

## 2024-05-21 LAB
ALBUMIN SERPL BCG-MCNC: 4.1 G/DL (ref 3.5–5.2)
ALBUMIN SERPL BCG-MCNC: 4.1 G/DL (ref 3.5–5.2)
ALP SERPL-CCNC: 70 U/L (ref 40–150)
ALP SERPL-CCNC: 70 U/L (ref 40–150)
ALT SERPL W P-5'-P-CCNC: 14 U/L (ref 0–70)
ALT SERPL W P-5'-P-CCNC: 14 U/L (ref 0–70)
ANION GAP SERPL CALCULATED.3IONS-SCNC: 12 MMOL/L (ref 7–15)
ANION GAP SERPL CALCULATED.3IONS-SCNC: 12 MMOL/L (ref 7–15)
AST SERPL W P-5'-P-CCNC: 23 U/L (ref 0–45)
AST SERPL W P-5'-P-CCNC: 23 U/L (ref 0–45)
BILIRUB SERPL-MCNC: 0.7 MG/DL
BILIRUB SERPL-MCNC: 0.7 MG/DL
BUN SERPL-MCNC: 24 MG/DL (ref 8–23)
BUN SERPL-MCNC: 24 MG/DL (ref 8–23)
CALCIUM SERPL-MCNC: 10.3 MG/DL (ref 8.8–10.2)
CALCIUM SERPL-MCNC: 10.3 MG/DL (ref 8.8–10.2)
CHLORIDE SERPL-SCNC: 106 MMOL/L (ref 98–107)
CHLORIDE SERPL-SCNC: 106 MMOL/L (ref 98–107)
CHOLEST SERPL-MCNC: 205 MG/DL
CREAT SERPL-MCNC: 0.9 MG/DL (ref 0.67–1.17)
CREAT SERPL-MCNC: 0.9 MG/DL (ref 0.67–1.17)
DEPRECATED HCO3 PLAS-SCNC: 22 MMOL/L (ref 22–29)
DEPRECATED HCO3 PLAS-SCNC: 22 MMOL/L (ref 22–29)
EGFRCR SERPLBLD CKD-EPI 2021: 83 ML/MIN/1.73M2
EGFRCR SERPLBLD CKD-EPI 2021: 83 ML/MIN/1.73M2
ERYTHROCYTE [DISTWIDTH] IN BLOOD BY AUTOMATED COUNT: 14.5 % (ref 10–15)
FASTING STATUS PATIENT QL REPORTED: NO
FASTING STATUS PATIENT QL REPORTED: NO
GLUCOSE SERPL-MCNC: 77 MG/DL (ref 70–99)
GLUCOSE SERPL-MCNC: 82 MG/DL (ref 70–99)
HCT VFR BLD AUTO: 41 % (ref 40–53)
HDLC SERPL-MCNC: 34 MG/DL
HGB BLD-MCNC: 13.6 G/DL (ref 13.3–17.7)
INR PPP: 2.6 (ref 0.85–1.15)
LDLC SERPL CALC-MCNC: 104 MG/DL
MCH RBC QN AUTO: 32.2 PG (ref 26.5–33)
MCHC RBC AUTO-ENTMCNC: 33.2 G/DL (ref 31.5–36.5)
MCV RBC AUTO: 97 FL (ref 78–100)
NONHDLC SERPL-MCNC: 171 MG/DL
PLATELET # BLD AUTO: 148 10E3/UL (ref 150–450)
POTASSIUM SERPL-SCNC: 4.1 MMOL/L (ref 3.4–5.3)
POTASSIUM SERPL-SCNC: 4.1 MMOL/L (ref 3.4–5.3)
PROT SERPL-MCNC: 6.4 G/DL (ref 6.4–8.3)
PROT SERPL-MCNC: 6.4 G/DL (ref 6.4–8.3)
RBC # BLD AUTO: 4.22 10E6/UL (ref 4.4–5.9)
SODIUM SERPL-SCNC: 140 MMOL/L (ref 135–145)
SODIUM SERPL-SCNC: 140 MMOL/L (ref 135–145)
TRIGL SERPL-MCNC: 335 MG/DL
VIT D+METAB SERPL-MCNC: 40 NG/ML (ref 20–50)
WBC # BLD AUTO: 6.5 10E3/UL (ref 4–11)

## 2024-05-21 PROCEDURE — 82306 VITAMIN D 25 HYDROXY: CPT | Mod: ORL

## 2024-05-21 PROCEDURE — 85027 COMPLETE CBC AUTOMATED: CPT | Mod: ORL

## 2024-05-21 PROCEDURE — P9603 ONE-WAY ALLOW PRORATED MILES: HCPCS | Mod: ORL

## 2024-05-21 PROCEDURE — 84450 TRANSFERASE (AST) (SGOT): CPT | Mod: ORL

## 2024-05-21 PROCEDURE — 36415 COLL VENOUS BLD VENIPUNCTURE: CPT | Mod: ORL

## 2024-05-21 PROCEDURE — 82040 ASSAY OF SERUM ALBUMIN: CPT | Mod: ORL

## 2024-05-21 PROCEDURE — 80061 LIPID PANEL: CPT | Mod: ORL

## 2024-05-21 PROCEDURE — 85610 PROTHROMBIN TIME: CPT | Mod: ORL

## 2024-05-21 PROCEDURE — 80053 COMPREHEN METABOLIC PANEL: CPT | Mod: ORL

## 2024-06-02 ENCOUNTER — LAB REQUISITION (OUTPATIENT)
Dept: LAB | Facility: CLINIC | Age: 89
End: 2024-06-02
Payer: MEDICARE

## 2024-06-02 DIAGNOSIS — I48.91 UNSPECIFIED ATRIAL FIBRILLATION (H): ICD-10-CM

## 2024-06-04 LAB — INR PPP: 2.83 (ref 0.85–1.15)

## 2024-06-04 PROCEDURE — 85610 PROTHROMBIN TIME: CPT | Mod: ORL

## 2024-06-04 PROCEDURE — 36415 COLL VENOUS BLD VENIPUNCTURE: CPT | Mod: ORL

## 2024-06-04 PROCEDURE — P9604 ONE-WAY ALLOW PRORATED TRIP: HCPCS | Mod: ORL

## 2024-06-15 ENCOUNTER — LAB REQUISITION (OUTPATIENT)
Dept: LAB | Facility: CLINIC | Age: 89
End: 2024-06-15
Payer: MEDICARE

## 2024-06-15 DIAGNOSIS — I48.0 PAROXYSMAL ATRIAL FIBRILLATION (H): ICD-10-CM

## 2024-06-18 LAB — INR PPP: 2.44 (ref 0.85–1.15)

## 2024-06-18 PROCEDURE — 36415 COLL VENOUS BLD VENIPUNCTURE: CPT | Mod: ORL

## 2024-06-18 PROCEDURE — P9603 ONE-WAY ALLOW PRORATED MILES: HCPCS | Mod: ORL

## 2024-06-18 PROCEDURE — 85610 PROTHROMBIN TIME: CPT | Mod: ORL

## 2024-07-05 ENCOUNTER — LAB REQUISITION (OUTPATIENT)
Dept: LAB | Facility: CLINIC | Age: 89
End: 2024-07-05
Payer: MEDICARE

## 2024-07-05 DIAGNOSIS — I48.91 UNSPECIFIED ATRIAL FIBRILLATION (H): ICD-10-CM

## 2024-07-09 LAB — INR PPP: 1.61 (ref 0.85–1.15)

## 2024-07-09 PROCEDURE — P9603 ONE-WAY ALLOW PRORATED MILES: HCPCS | Mod: ORL

## 2024-07-09 PROCEDURE — 36415 COLL VENOUS BLD VENIPUNCTURE: CPT | Mod: ORL

## 2024-07-09 PROCEDURE — 85610 PROTHROMBIN TIME: CPT | Mod: ORL

## 2024-07-19 ENCOUNTER — LAB REQUISITION (OUTPATIENT)
Dept: LAB | Facility: CLINIC | Age: 89
End: 2024-07-19
Payer: MEDICARE

## 2024-07-23 LAB — INR PPP: 2.43 (ref 0.85–1.15)

## 2024-07-23 PROCEDURE — P9603 ONE-WAY ALLOW PRORATED MILES: HCPCS | Mod: ORL

## 2024-07-23 PROCEDURE — 85610 PROTHROMBIN TIME: CPT | Mod: ORL

## 2024-07-23 PROCEDURE — 36415 COLL VENOUS BLD VENIPUNCTURE: CPT | Mod: ORL

## 2024-08-04 ENCOUNTER — LAB REQUISITION (OUTPATIENT)
Dept: LAB | Facility: CLINIC | Age: 89
End: 2024-08-04
Payer: MEDICARE

## 2024-08-04 DIAGNOSIS — I48.91 UNSPECIFIED ATRIAL FIBRILLATION (H): ICD-10-CM

## 2024-08-06 LAB — INR PPP: 2.86 (ref 0.85–1.15)

## 2024-08-06 PROCEDURE — 85610 PROTHROMBIN TIME: CPT | Mod: ORL

## 2024-08-06 PROCEDURE — 36415 COLL VENOUS BLD VENIPUNCTURE: CPT | Mod: ORL

## 2024-08-06 PROCEDURE — P9604 ONE-WAY ALLOW PRORATED TRIP: HCPCS | Mod: ORL

## 2024-08-16 ENCOUNTER — LAB REQUISITION (OUTPATIENT)
Dept: LAB | Facility: CLINIC | Age: 89
End: 2024-08-16
Payer: MEDICARE

## 2024-08-16 DIAGNOSIS — I48.91 UNSPECIFIED ATRIAL FIBRILLATION (H): ICD-10-CM

## 2024-08-20 LAB — INR PPP: 2.02 (ref 0.85–1.15)

## 2024-08-20 PROCEDURE — P9603 ONE-WAY ALLOW PRORATED MILES: HCPCS | Mod: ORL

## 2024-08-20 PROCEDURE — 85610 PROTHROMBIN TIME: CPT | Mod: ORL

## 2024-08-20 PROCEDURE — 36415 COLL VENOUS BLD VENIPUNCTURE: CPT | Mod: ORL

## 2024-08-29 ENCOUNTER — LAB REQUISITION (OUTPATIENT)
Dept: LAB | Facility: CLINIC | Age: 89
End: 2024-08-29
Payer: MEDICARE

## 2024-08-29 DIAGNOSIS — I48.0 PAROXYSMAL ATRIAL FIBRILLATION (H): ICD-10-CM

## 2024-09-10 LAB — INR PPP: 1.92 (ref 0.85–1.15)

## 2024-09-10 PROCEDURE — P9604 ONE-WAY ALLOW PRORATED TRIP: HCPCS | Mod: ORL

## 2024-09-10 PROCEDURE — 36415 COLL VENOUS BLD VENIPUNCTURE: CPT | Mod: ORL

## 2024-09-10 PROCEDURE — 85610 PROTHROMBIN TIME: CPT | Mod: ORL

## 2024-09-21 ENCOUNTER — LAB REQUISITION (OUTPATIENT)
Dept: LAB | Facility: CLINIC | Age: 89
End: 2024-09-21
Payer: MEDICARE

## 2024-09-21 DIAGNOSIS — I48.91 UNSPECIFIED ATRIAL FIBRILLATION (H): ICD-10-CM

## 2024-09-22 ENCOUNTER — HEALTH MAINTENANCE LETTER (OUTPATIENT)
Age: 89
End: 2024-09-22

## 2024-09-24 LAB — INR PPP: 1.4 (ref 0.85–1.15)

## 2024-09-24 PROCEDURE — 85610 PROTHROMBIN TIME: CPT | Mod: ORL

## 2024-09-24 PROCEDURE — P9603 ONE-WAY ALLOW PRORATED MILES: HCPCS | Mod: ORL

## 2024-09-24 PROCEDURE — 36415 COLL VENOUS BLD VENIPUNCTURE: CPT | Mod: ORL

## 2024-10-11 ENCOUNTER — LAB REQUISITION (OUTPATIENT)
Dept: LAB | Facility: CLINIC | Age: 89
End: 2024-10-11
Payer: MEDICARE

## 2024-10-11 DIAGNOSIS — I48.0 PAROXYSMAL ATRIAL FIBRILLATION (H): ICD-10-CM

## 2024-10-15 ENCOUNTER — LAB REQUISITION (OUTPATIENT)
Dept: LAB | Facility: CLINIC | Age: 89
End: 2024-10-15
Payer: MEDICARE

## 2024-10-15 DIAGNOSIS — I48.91 UNSPECIFIED ATRIAL FIBRILLATION (H): ICD-10-CM

## 2024-10-17 LAB — INR PPP: 1.45 (ref 0.85–1.15)

## 2024-10-17 PROCEDURE — 85610 PROTHROMBIN TIME: CPT | Mod: ORL

## 2024-10-17 PROCEDURE — 36415 COLL VENOUS BLD VENIPUNCTURE: CPT | Mod: ORL

## 2024-10-17 PROCEDURE — P9603 ONE-WAY ALLOW PRORATED MILES: HCPCS | Mod: ORL

## 2024-10-18 ENCOUNTER — LAB REQUISITION (OUTPATIENT)
Dept: LAB | Facility: CLINIC | Age: 89
End: 2024-10-18
Payer: MEDICARE

## 2024-10-18 DIAGNOSIS — I48.0 PAROXYSMAL ATRIAL FIBRILLATION (H): ICD-10-CM

## 2024-10-22 LAB — INR PPP: 1.4 (ref 0.85–1.15)

## 2024-10-22 PROCEDURE — 36415 COLL VENOUS BLD VENIPUNCTURE: CPT | Mod: ORL

## 2024-10-22 PROCEDURE — P9603 ONE-WAY ALLOW PRORATED MILES: HCPCS | Mod: ORL

## 2024-10-22 PROCEDURE — 85610 PROTHROMBIN TIME: CPT | Mod: ORL

## 2024-11-01 ENCOUNTER — LAB REQUISITION (OUTPATIENT)
Dept: LAB | Facility: CLINIC | Age: 89
End: 2024-11-01
Payer: MEDICARE

## 2024-11-01 DIAGNOSIS — I48.0 PAROXYSMAL ATRIAL FIBRILLATION (H): ICD-10-CM

## 2024-11-05 LAB — INR PPP: 1.68 (ref 0.85–1.15)

## 2024-11-14 ENCOUNTER — LAB REQUISITION (OUTPATIENT)
Dept: LAB | Facility: CLINIC | Age: 89
End: 2024-11-14
Payer: MEDICARE

## 2024-11-14 DIAGNOSIS — I48.91 UNSPECIFIED ATRIAL FIBRILLATION (H): ICD-10-CM

## 2024-11-19 LAB — INR PPP: 4.56 (ref 0.85–1.15)

## 2024-11-22 ENCOUNTER — LAB REQUISITION (OUTPATIENT)
Dept: LAB | Facility: CLINIC | Age: 89
End: 2024-11-22
Payer: MEDICARE

## 2024-11-22 DIAGNOSIS — I48.0 PAROXYSMAL ATRIAL FIBRILLATION (H): ICD-10-CM

## 2024-11-26 LAB — INR PPP: 2.98 (ref 0.85–1.15)

## 2024-12-06 ENCOUNTER — LAB REQUISITION (OUTPATIENT)
Dept: LAB | Facility: CLINIC | Age: 89
End: 2024-12-06
Payer: MEDICARE

## 2024-12-06 DIAGNOSIS — I48.91 UNSPECIFIED ATRIAL FIBRILLATION (H): ICD-10-CM

## 2024-12-10 LAB — INR PPP: 2.1 (ref 0.85–1.15)

## 2024-12-10 PROCEDURE — P9604 ONE-WAY ALLOW PRORATED TRIP: HCPCS | Mod: ORL

## 2024-12-10 PROCEDURE — 36415 COLL VENOUS BLD VENIPUNCTURE: CPT | Mod: ORL

## 2024-12-10 PROCEDURE — 85610 PROTHROMBIN TIME: CPT | Mod: ORL

## 2024-12-19 ENCOUNTER — LAB REQUISITION (OUTPATIENT)
Dept: LAB | Facility: CLINIC | Age: 89
End: 2024-12-19
Payer: MEDICARE

## 2024-12-19 DIAGNOSIS — E78.00 PURE HYPERCHOLESTEROLEMIA, UNSPECIFIED: ICD-10-CM

## 2024-12-19 DIAGNOSIS — N18.31 CHRONIC KIDNEY DISEASE, STAGE 3A (H): ICD-10-CM

## 2024-12-19 DIAGNOSIS — I48.91 UNSPECIFIED ATRIAL FIBRILLATION (H): ICD-10-CM

## 2024-12-24 LAB
ALBUMIN SERPL BCG-MCNC: 4.1 G/DL (ref 3.5–5.2)
ALP SERPL-CCNC: 81 U/L (ref 40–150)
ALT SERPL W P-5'-P-CCNC: 13 U/L (ref 0–70)
ANION GAP SERPL CALCULATED.3IONS-SCNC: 13 MMOL/L (ref 7–15)
AST SERPL W P-5'-P-CCNC: 31 U/L (ref 0–45)
BILIRUB SERPL-MCNC: 0.8 MG/DL
BUN SERPL-MCNC: 19.5 MG/DL (ref 8–23)
CALCIUM SERPL-MCNC: 9.9 MG/DL (ref 8.8–10.4)
CHLORIDE SERPL-SCNC: 105 MMOL/L (ref 98–107)
CHOLEST SERPL-MCNC: 232 MG/DL
CREAT SERPL-MCNC: 0.87 MG/DL (ref 0.67–1.17)
EGFRCR SERPLBLD CKD-EPI 2021: 84 ML/MIN/1.73M2
ERYTHROCYTE [DISTWIDTH] IN BLOOD BY AUTOMATED COUNT: 14.6 % (ref 10–15)
FASTING STATUS PATIENT QL REPORTED: NO
GLUCOSE SERPL-MCNC: 142 MG/DL (ref 70–99)
HCO3 SERPL-SCNC: 20 MMOL/L (ref 22–29)
HCT VFR BLD AUTO: 41.2 % (ref 40–53)
HDLC SERPL-MCNC: 29 MG/DL
HGB BLD-MCNC: 14.2 G/DL (ref 13.3–17.7)
INR PPP: 1.61 (ref 0.85–1.15)
LDLC SERPL CALC-MCNC: ABNORMAL MG/DL
MCH RBC QN AUTO: 32.5 PG (ref 26.5–33)
MCHC RBC AUTO-ENTMCNC: 34.5 G/DL (ref 31.5–36.5)
MCV RBC AUTO: 94 FL (ref 78–100)
NONHDLC SERPL-MCNC: 203 MG/DL
PLATELET # BLD AUTO: 172 10E3/UL (ref 150–450)
POTASSIUM SERPL-SCNC: 4.3 MMOL/L (ref 3.4–5.3)
PROT SERPL-MCNC: 6.8 G/DL (ref 6.4–8.3)
RBC # BLD AUTO: 4.37 10E6/UL (ref 4.4–5.9)
SODIUM SERPL-SCNC: 138 MMOL/L (ref 135–145)
TRIGL SERPL-MCNC: 452 MG/DL
WBC # BLD AUTO: 6.1 10E3/UL (ref 4–11)

## 2025-01-04 ENCOUNTER — LAB REQUISITION (OUTPATIENT)
Dept: LAB | Facility: CLINIC | Age: 89
End: 2025-01-04
Payer: MEDICARE

## 2025-01-04 DIAGNOSIS — I48.91 UNSPECIFIED ATRIAL FIBRILLATION (H): ICD-10-CM

## 2025-01-07 ENCOUNTER — LAB REQUISITION (OUTPATIENT)
Dept: LAB | Facility: CLINIC | Age: 89
End: 2025-01-07
Payer: MEDICARE

## 2025-01-07 LAB — INR PPP: 1.41 (ref 0.85–1.15)

## 2025-01-07 PROCEDURE — 36415 COLL VENOUS BLD VENIPUNCTURE: CPT | Mod: ORL

## 2025-01-07 PROCEDURE — P9604 ONE-WAY ALLOW PRORATED TRIP: HCPCS | Mod: ORL

## 2025-01-07 PROCEDURE — 85610 PROTHROMBIN TIME: CPT | Mod: ORL

## 2025-01-09 ENCOUNTER — LAB REQUISITION (OUTPATIENT)
Dept: LAB | Facility: CLINIC | Age: 89
End: 2025-01-09
Payer: MEDICARE

## 2025-01-09 LAB
C PNEUM DNA SPEC QL NAA+PROBE: NOT DETECTED
FLUAV H1 2009 PAND RNA SPEC QL NAA+PROBE: NOT DETECTED
FLUAV H1 RNA SPEC QL NAA+PROBE: NOT DETECTED
FLUAV H3 RNA SPEC QL NAA+PROBE: DETECTED
FLUAV RNA SPEC QL NAA+PROBE: DETECTED
FLUBV RNA SPEC QL NAA+PROBE: NOT DETECTED
HADV DNA SPEC QL NAA+PROBE: NOT DETECTED
HCOV PNL SPEC NAA+PROBE: NOT DETECTED
HMPV RNA SPEC QL NAA+PROBE: NOT DETECTED
HPIV1 RNA SPEC QL NAA+PROBE: NOT DETECTED
HPIV2 RNA SPEC QL NAA+PROBE: NOT DETECTED
HPIV3 RNA SPEC QL NAA+PROBE: NOT DETECTED
HPIV4 RNA SPEC QL NAA+PROBE: NOT DETECTED
M PNEUMO DNA SPEC QL NAA+PROBE: NOT DETECTED
RSV RNA SPEC QL NAA+PROBE: NOT DETECTED
RSV RNA SPEC QL NAA+PROBE: NOT DETECTED
RV+EV RNA SPEC QL NAA+PROBE: NOT DETECTED

## 2025-01-09 PROCEDURE — 87486 CHLMYD PNEUM DNA AMP PROBE: CPT | Mod: ORL

## 2025-01-09 PROCEDURE — 87581 M.PNEUMON DNA AMP PROBE: CPT | Mod: ORL

## 2025-01-09 PROCEDURE — 87633 RESP VIRUS 12-25 TARGETS: CPT | Mod: ORL

## 2025-01-11 ENCOUNTER — LAB REQUISITION (OUTPATIENT)
Dept: LAB | Facility: CLINIC | Age: 89
End: 2025-01-11
Payer: MEDICARE

## 2025-01-11 DIAGNOSIS — I48.91 UNSPECIFIED ATRIAL FIBRILLATION (H): ICD-10-CM

## 2025-01-14 LAB — INR PPP: 4.75 (ref 0.85–1.15)

## 2025-01-17 ENCOUNTER — LAB REQUISITION (OUTPATIENT)
Dept: LAB | Facility: CLINIC | Age: 89
End: 2025-01-17
Payer: MEDICARE

## 2025-01-17 DIAGNOSIS — I48.0 PAROXYSMAL ATRIAL FIBRILLATION (H): ICD-10-CM

## 2025-01-21 LAB — INR PPP: 3.86 (ref 0.85–1.15)

## 2025-02-01 ENCOUNTER — LAB REQUISITION (OUTPATIENT)
Dept: LAB | Facility: CLINIC | Age: 89
End: 2025-02-01
Payer: MEDICARE

## 2025-02-01 DIAGNOSIS — I48.0 PAROXYSMAL ATRIAL FIBRILLATION (H): ICD-10-CM

## 2025-02-04 LAB — INR PPP: 4.62 (ref 0.85–1.15)

## 2025-02-07 ENCOUNTER — LAB REQUISITION (OUTPATIENT)
Dept: LAB | Facility: CLINIC | Age: 89
End: 2025-02-07
Payer: MEDICARE

## 2025-02-07 DIAGNOSIS — I48.0 PAROXYSMAL ATRIAL FIBRILLATION (H): ICD-10-CM

## 2025-02-11 ENCOUNTER — LAB REQUISITION (OUTPATIENT)
Dept: LAB | Facility: CLINIC | Age: 89
End: 2025-02-11
Payer: MEDICARE

## 2025-02-11 DIAGNOSIS — I48.91 UNSPECIFIED ATRIAL FIBRILLATION (H): ICD-10-CM

## 2025-02-11 LAB — INR PPP: 3.93 (ref 0.85–1.15)

## 2025-02-20 ENCOUNTER — LAB REQUISITION (OUTPATIENT)
Dept: LAB | Facility: CLINIC | Age: 89
End: 2025-02-20
Payer: MEDICARE

## 2025-02-20 DIAGNOSIS — I48.0 PAROXYSMAL ATRIAL FIBRILLATION (H): ICD-10-CM

## 2025-02-23 ENCOUNTER — LAB REQUISITION (OUTPATIENT)
Dept: LAB | Facility: CLINIC | Age: 89
End: 2025-02-23
Payer: MEDICARE

## 2025-02-23 DIAGNOSIS — I48.91 UNSPECIFIED ATRIAL FIBRILLATION (H): ICD-10-CM

## 2025-02-25 LAB — INR PPP: 2.78 (ref 0.85–1.15)

## 2025-02-25 PROCEDURE — 36415 COLL VENOUS BLD VENIPUNCTURE: CPT | Mod: ORL

## 2025-02-25 PROCEDURE — 85610 PROTHROMBIN TIME: CPT | Mod: ORL

## 2025-02-25 PROCEDURE — P9603 ONE-WAY ALLOW PRORATED MILES: HCPCS | Mod: ORL

## 2025-03-10 ENCOUNTER — LAB REQUISITION (OUTPATIENT)
Dept: LAB | Facility: CLINIC | Age: 89
End: 2025-03-10
Payer: MEDICARE

## 2025-03-10 DIAGNOSIS — I48.0 PAROXYSMAL ATRIAL FIBRILLATION (H): ICD-10-CM

## 2025-03-11 ENCOUNTER — LAB REQUISITION (OUTPATIENT)
Dept: LAB | Facility: CLINIC | Age: 89
End: 2025-03-11
Payer: MEDICARE

## 2025-03-11 DIAGNOSIS — I48.91 UNSPECIFIED ATRIAL FIBRILLATION (H): ICD-10-CM

## 2025-03-11 LAB — INR PPP: 2.96 (ref 0.85–1.15)

## 2025-03-11 PROCEDURE — 36415 COLL VENOUS BLD VENIPUNCTURE: CPT | Mod: ORL

## 2025-03-11 PROCEDURE — P9603 ONE-WAY ALLOW PRORATED MILES: HCPCS | Mod: ORL

## 2025-03-11 PROCEDURE — 85610 PROTHROMBIN TIME: CPT | Mod: ORL

## 2025-03-18 LAB — INR PPP: 5.91 (ref 0.85–1.15)

## 2025-03-18 PROCEDURE — P9604 ONE-WAY ALLOW PRORATED TRIP: HCPCS | Mod: ORL

## 2025-03-18 PROCEDURE — 36415 COLL VENOUS BLD VENIPUNCTURE: CPT | Mod: ORL

## 2025-03-18 PROCEDURE — 85610 PROTHROMBIN TIME: CPT | Mod: ORL

## 2025-03-21 ENCOUNTER — LAB REQUISITION (OUTPATIENT)
Dept: LAB | Facility: CLINIC | Age: 89
End: 2025-03-21
Payer: MEDICARE

## 2025-03-21 DIAGNOSIS — I48.0 PAROXYSMAL ATRIAL FIBRILLATION (H): ICD-10-CM

## 2025-03-25 ENCOUNTER — LAB REQUISITION (OUTPATIENT)
Dept: LAB | Facility: CLINIC | Age: 89
End: 2025-03-25
Payer: MEDICARE

## 2025-03-25 DIAGNOSIS — I48.91 UNSPECIFIED ATRIAL FIBRILLATION (H): ICD-10-CM

## 2025-03-25 LAB — INR PPP: 1.62 (ref 0.85–1.15)

## 2025-03-25 PROCEDURE — P9604 ONE-WAY ALLOW PRORATED TRIP: HCPCS | Mod: ORL

## 2025-03-25 PROCEDURE — 36415 COLL VENOUS BLD VENIPUNCTURE: CPT | Mod: ORL

## 2025-03-25 PROCEDURE — 85610 PROTHROMBIN TIME: CPT | Mod: ORL

## 2025-03-27 ENCOUNTER — LAB REQUISITION (OUTPATIENT)
Dept: LAB | Facility: CLINIC | Age: 89
End: 2025-03-27
Payer: MEDICARE

## 2025-03-27 DIAGNOSIS — I48.0 PAROXYSMAL ATRIAL FIBRILLATION (H): ICD-10-CM

## 2025-04-01 LAB — INR PPP: 2.2 (ref 0.85–1.15)

## 2025-04-01 PROCEDURE — 36415 COLL VENOUS BLD VENIPUNCTURE: CPT | Mod: ORL

## 2025-04-01 PROCEDURE — 85610 PROTHROMBIN TIME: CPT | Mod: ORL

## 2025-04-01 PROCEDURE — P9604 ONE-WAY ALLOW PRORATED TRIP: HCPCS | Mod: ORL

## 2025-04-14 ENCOUNTER — LAB REQUISITION (OUTPATIENT)
Dept: LAB | Facility: CLINIC | Age: 89
End: 2025-04-14
Payer: MEDICARE

## 2025-04-14 DIAGNOSIS — I48.91 UNSPECIFIED ATRIAL FIBRILLATION (H): ICD-10-CM

## 2025-04-15 LAB — INR PPP: 2.87 (ref 0.85–1.15)

## 2025-04-24 ENCOUNTER — LAB REQUISITION (OUTPATIENT)
Dept: LAB | Facility: CLINIC | Age: 89
End: 2025-04-24
Payer: MEDICARE

## 2025-04-24 DIAGNOSIS — I48.0 PAROXYSMAL ATRIAL FIBRILLATION (H): ICD-10-CM

## 2025-04-28 ENCOUNTER — LAB REQUISITION (OUTPATIENT)
Dept: LAB | Facility: CLINIC | Age: 89
End: 2025-04-28
Payer: MEDICARE

## 2025-04-28 DIAGNOSIS — I48.91 UNSPECIFIED ATRIAL FIBRILLATION (H): ICD-10-CM

## 2025-04-29 LAB
INR PPP: 3.87 (ref 0.85–1.15)
PROTHROMBIN TIME: 36.8 SECONDS (ref 11.8–14.8)

## 2025-05-13 ENCOUNTER — LAB REQUISITION (OUTPATIENT)
Dept: LAB | Facility: CLINIC | Age: 89
End: 2025-05-13
Payer: MEDICARE

## 2025-05-13 DIAGNOSIS — I48.91 UNSPECIFIED ATRIAL FIBRILLATION (H): ICD-10-CM

## 2025-05-18 ENCOUNTER — LAB REQUISITION (OUTPATIENT)
Dept: LAB | Facility: CLINIC | Age: 89
End: 2025-05-18
Payer: MEDICARE

## 2025-05-18 DIAGNOSIS — I48.0 PAROXYSMAL ATRIAL FIBRILLATION (H): ICD-10-CM

## 2025-05-19 ENCOUNTER — LAB REQUISITION (OUTPATIENT)
Dept: LAB | Facility: CLINIC | Age: 89
End: 2025-05-19
Payer: MEDICARE

## 2025-05-19 DIAGNOSIS — Z79.01 LONG TERM (CURRENT) USE OF ANTICOAGULANTS: ICD-10-CM

## 2025-05-19 DIAGNOSIS — I48.91 UNSPECIFIED ATRIAL FIBRILLATION (H): ICD-10-CM

## 2025-05-20 LAB
INR PPP: 1.89 (ref 0.85–1.15)
PROTHROMBIN TIME: 21.9 SECONDS (ref 11.8–14.8)

## 2025-05-20 PROCEDURE — 36415 COLL VENOUS BLD VENIPUNCTURE: CPT | Mod: ORL

## 2025-05-20 PROCEDURE — 85610 PROTHROMBIN TIME: CPT | Mod: ORL

## 2025-05-20 PROCEDURE — P9604 ONE-WAY ALLOW PRORATED TRIP: HCPCS | Mod: ORL

## 2025-05-29 ENCOUNTER — LAB REQUISITION (OUTPATIENT)
Dept: LAB | Facility: CLINIC | Age: 89
End: 2025-05-29
Payer: MEDICARE

## 2025-05-29 DIAGNOSIS — I48.0 PAROXYSMAL ATRIAL FIBRILLATION (H): ICD-10-CM

## 2025-06-02 ENCOUNTER — LAB REQUISITION (OUTPATIENT)
Dept: LAB | Facility: CLINIC | Age: 89
End: 2025-06-02
Payer: MEDICARE

## 2025-06-02 DIAGNOSIS — I48.91 UNSPECIFIED ATRIAL FIBRILLATION (H): ICD-10-CM

## 2025-06-03 LAB
INR PPP: 1.6 (ref 0.85–1.15)
PROTHROMBIN TIME: 19.3 SECONDS (ref 11.8–14.8)

## 2025-06-11 ENCOUNTER — LAB REQUISITION (OUTPATIENT)
Dept: LAB | Facility: CLINIC | Age: 89
End: 2025-06-11
Payer: MEDICARE

## 2025-06-11 DIAGNOSIS — I48.0 PAROXYSMAL ATRIAL FIBRILLATION (H): ICD-10-CM

## 2025-06-16 ENCOUNTER — LAB REQUISITION (OUTPATIENT)
Dept: LAB | Facility: CLINIC | Age: 89
End: 2025-06-16
Payer: MEDICARE

## 2025-06-16 DIAGNOSIS — I48.91 UNSPECIFIED ATRIAL FIBRILLATION (H): ICD-10-CM

## 2025-06-17 LAB
INR PPP: 2 (ref 0.85–1.15)
PROTHROMBIN TIME: 22.8 SECONDS (ref 11.8–14.8)

## 2025-06-23 ENCOUNTER — LAB REQUISITION (OUTPATIENT)
Dept: LAB | Facility: CLINIC | Age: 89
End: 2025-06-23
Payer: MEDICARE

## 2025-06-23 DIAGNOSIS — I48.0 PAROXYSMAL ATRIAL FIBRILLATION (H): ICD-10-CM

## 2025-06-30 ENCOUNTER — LAB REQUISITION (OUTPATIENT)
Dept: LAB | Facility: CLINIC | Age: 89
End: 2025-06-30
Payer: MEDICARE

## 2025-06-30 DIAGNOSIS — I48.91 UNSPECIFIED ATRIAL FIBRILLATION (H): ICD-10-CM

## 2025-07-01 LAB
INR PPP: 3.36 (ref 0.85–1.15)
PROTHROMBIN TIME: 33 SECONDS (ref 11.8–14.8)

## 2025-07-10 ENCOUNTER — LAB REQUISITION (OUTPATIENT)
Dept: LAB | Facility: CLINIC | Age: 89
End: 2025-07-10
Payer: MEDICARE

## 2025-07-10 DIAGNOSIS — I48.0 PAROXYSMAL ATRIAL FIBRILLATION (H): ICD-10-CM

## 2025-07-22 LAB
INR PPP: 2.32 (ref 0.85–1.15)
PROTHROMBIN TIME: 25.5 SECONDS (ref 11.8–14.8)

## 2025-07-29 ENCOUNTER — LAB REQUISITION (OUTPATIENT)
Dept: LAB | Facility: CLINIC | Age: 89
End: 2025-07-29
Payer: MEDICARE

## 2025-07-29 DIAGNOSIS — I48.91 UNSPECIFIED ATRIAL FIBRILLATION (H): ICD-10-CM

## 2025-08-05 LAB
INR PPP: 2.19 (ref 0.85–1.15)
PROTHROMBIN TIME: 23.9 SECONDS (ref 11.8–14.8)

## 2025-08-19 ENCOUNTER — LAB REQUISITION (OUTPATIENT)
Dept: LAB | Facility: CLINIC | Age: 89
End: 2025-08-19
Payer: MEDICARE

## 2025-08-19 DIAGNOSIS — I48.91 UNSPECIFIED ATRIAL FIBRILLATION (H): ICD-10-CM

## 2025-08-22 ENCOUNTER — LAB REQUISITION (OUTPATIENT)
Dept: LAB | Facility: CLINIC | Age: 89
End: 2025-08-22
Payer: MEDICARE

## 2025-08-22 DIAGNOSIS — I48.0 PAROXYSMAL ATRIAL FIBRILLATION (H): ICD-10-CM

## 2025-08-26 LAB
INR PPP: 1.65 (ref 0.85–1.15)
PROTHROMBIN TIME: 19.3 SECONDS (ref 11.8–14.8)

## 2025-08-27 ENCOUNTER — LAB REQUISITION (OUTPATIENT)
Dept: LAB | Facility: CLINIC | Age: 89
End: 2025-08-27
Payer: MEDICARE

## 2025-08-27 DIAGNOSIS — I48.91 UNSPECIFIED ATRIAL FIBRILLATION (H): ICD-10-CM

## 2025-09-02 LAB
INR PPP: 1.88 (ref 0.85–1.15)
PROTHROMBIN TIME: 21.3 SECONDS (ref 11.8–14.8)